# Patient Record
Sex: MALE | Race: WHITE | NOT HISPANIC OR LATINO | Employment: OTHER | URBAN - METROPOLITAN AREA
[De-identification: names, ages, dates, MRNs, and addresses within clinical notes are randomized per-mention and may not be internally consistent; named-entity substitution may affect disease eponyms.]

---

## 2019-01-01 RX ORDER — ATORVASTATIN CALCIUM 40 MG/1
TABLET, FILM COATED ORAL
Refills: 2 | COMMUNITY
Start: 2019-01-01 | End: 2020-01-01 | Stop reason: HOSPADM

## 2019-01-01 RX ORDER — GLIMEPIRIDE 2 MG/1
TABLET ORAL
Refills: 2 | COMMUNITY
Start: 2019-01-01 | End: 2019-01-01 | Stop reason: SDUPTHER

## 2019-01-01 RX ORDER — LISINOPRIL 40 MG/1
TABLET ORAL
Qty: 90 TABLET | Refills: 3 | Status: SHIPPED | OUTPATIENT
Start: 2019-01-01 | End: 2020-01-01 | Stop reason: HOSPADM

## 2019-01-01 RX ORDER — AMLODIPINE BESYLATE 2.5 MG/1
TABLET ORAL
Refills: 1 | COMMUNITY
Start: 2019-01-01 | End: 2020-01-01 | Stop reason: SDUPTHER

## 2019-01-01 RX ORDER — FENOFIBRATE 145 MG/1
TABLET, FILM COATED ORAL
Refills: 2 | COMMUNITY
Start: 2019-01-01 | End: 2019-01-01 | Stop reason: SDUPTHER

## 2019-01-01 RX ORDER — LISINOPRIL 40 MG/1
40 TABLET ORAL DAILY
Qty: 90 TABLET | Refills: 0 | Status: SHIPPED | OUTPATIENT
Start: 2019-01-01 | End: 2019-01-01 | Stop reason: SDUPTHER

## 2019-01-01 RX ORDER — CLOPIDOGREL BISULFATE 75 MG/1
TABLET ORAL
Refills: 1 | COMMUNITY
Start: 2019-01-01 | End: 2020-01-01 | Stop reason: HOSPADM

## 2019-01-01 RX ORDER — EMPAGLIFLOZIN 10 MG/1
TABLET, FILM COATED ORAL
Refills: 5 | COMMUNITY
Start: 2019-01-01 | End: 2019-01-01 | Stop reason: SDUPTHER

## 2019-01-01 RX ORDER — FLUCONAZOLE 150 MG/1
150 TABLET ORAL DAILY
Qty: 2 TABLET | Refills: 3 | Status: ON HOLD | OUTPATIENT
Start: 2019-01-01 | End: 2020-01-01

## 2019-01-01 RX ORDER — METOPROLOL SUCCINATE 50 MG/1
TABLET, EXTENDED RELEASE ORAL
Qty: 90 TABLET | Refills: 3 | Status: SHIPPED | OUTPATIENT
Start: 2019-01-01 | End: 2020-01-01 | Stop reason: HOSPADM

## 2019-01-01 RX ORDER — LISINOPRIL 40 MG/1
TABLET ORAL
Refills: 1 | COMMUNITY
Start: 2019-01-01 | End: 2019-01-01 | Stop reason: SDUPTHER

## 2019-01-01 RX ORDER — GLIMEPIRIDE 2 MG/1
TABLET ORAL
Qty: 90 TABLET | Refills: 3 | Status: SHIPPED | OUTPATIENT
Start: 2019-01-01 | End: 2020-01-01 | Stop reason: HOSPADM

## 2019-01-01 RX ORDER — BLOOD-GLUCOSE METER
EACH MISCELLANEOUS
Refills: 1 | COMMUNITY
Start: 2019-01-01 | End: 2019-01-01 | Stop reason: SDUPTHER

## 2019-01-01 RX ORDER — LANCETS
EACH MISCELLANEOUS
Refills: 4 | COMMUNITY
Start: 2019-01-01 | End: 2020-01-01 | Stop reason: HOSPADM

## 2019-01-01 RX ORDER — METOPROLOL SUCCINATE 50 MG/1
TABLET, EXTENDED RELEASE ORAL
Refills: 1 | COMMUNITY
Start: 2019-01-01 | End: 2019-01-01 | Stop reason: SDUPTHER

## 2019-01-01 RX ORDER — FENOFIBRATE 145 MG/1
TABLET, FILM COATED ORAL
Qty: 90 TABLET | Refills: 3 | Status: SHIPPED | OUTPATIENT
Start: 2019-01-01 | End: 2020-01-01 | Stop reason: HOSPADM

## 2019-01-01 RX ORDER — BLOOD-GLUCOSE METER
EACH MISCELLANEOUS
Qty: 200 STRIP | Refills: 1 | Status: SHIPPED | OUTPATIENT
Start: 2019-01-01 | End: 2020-01-01 | Stop reason: HOSPADM

## 2019-07-18 NOTE — TELEPHONE ENCOUNTER
Left message for pt to return call, wanted to see when the last time pt was seen, and if they needed to schedule with Dr. Russell

## 2019-07-18 NOTE — TELEPHONE ENCOUNTER
----- Message from Mary Villa sent at 7/18/2019  8:50 AM CDT -----  Contact: Heidi Fischer (Wife)  Mr. Fischer is requesting a refill on Lisinopril 40 mg tablets (quanity 90).  [facility] [phone]    Mrs. Fischer can be reached at [phone].    Thanks

## 2019-07-18 NOTE — TELEPHONE ENCOUNTER
----- Message from Janna Thapa sent at 7/18/2019  1:06 PM CDT -----  Regarding: Please call back   Contact: [phone]  Returning Katie's call- Missed call and wants a call back asap

## 2019-08-15 NOTE — TELEPHONE ENCOUNTER
Pt saw you on 6/27, scheduled 6m appt on 1/9/20.     Ok to fill Verio test strips? Pt test twice daily

## 2019-08-15 NOTE — TELEPHONE ENCOUNTER
----- Message from Sully Dexter sent at 8/15/2019 10:46 AM CDT -----  Contact: Wife [phone]  Patient would like a refill for for his diabetic test strips sent to [id number] - [place], [place] - [address]. Please advise.

## 2019-10-21 NOTE — TELEPHONE ENCOUNTER
----- Message from Lauren Ramsay sent at 10/21/2019 10:08 AM CDT -----  Contact: Pt's Wife Mrs Fischer  Pt's Wife Mrs Fischer called to speak to the nurse regarding a previous Rx refill for JARDIANCE 10 mg Tab for the pt and would like the Rx sent [facility] on [address] in [place], [place] [phone] phone. Pharmacy stated that they sent the request over 3 times and Mrs Fischer would like a call back today at [phone]

## 2020-01-01 ENCOUNTER — TELEPHONE (OUTPATIENT)
Dept: PRIMARY CARE CLINIC | Facility: CLINIC | Age: 75
End: 2020-01-01

## 2020-01-01 ENCOUNTER — OFFICE VISIT (OUTPATIENT)
Dept: FAMILY MEDICINE | Facility: CLINIC | Age: 75
End: 2020-01-01
Payer: MEDICARE

## 2020-01-01 ENCOUNTER — HOSPITAL ENCOUNTER (INPATIENT)
Facility: HOSPITAL | Age: 75
LOS: 17 days | DRG: 870 | End: 2020-04-09
Attending: EMERGENCY MEDICINE | Admitting: HOSPITALIST
Payer: MEDICARE

## 2020-01-01 VITALS
SYSTOLIC BLOOD PRESSURE: 120 MMHG | TEMPERATURE: 98 F | WEIGHT: 177.5 LBS | BODY MASS INDEX: 26.9 KG/M2 | DIASTOLIC BLOOD PRESSURE: 68 MMHG | HEART RATE: 80 BPM | OXYGEN SATURATION: 96 % | HEIGHT: 68 IN

## 2020-01-01 VITALS
BODY MASS INDEX: 24.7 KG/M2 | DIASTOLIC BLOOD PRESSURE: 38 MMHG | SYSTOLIC BLOOD PRESSURE: 62 MMHG | OXYGEN SATURATION: 47 % | WEIGHT: 162.94 LBS | HEIGHT: 68 IN | TEMPERATURE: 98 F

## 2020-01-01 DIAGNOSIS — I73.9 PAD (PERIPHERAL ARTERY DISEASE): ICD-10-CM

## 2020-01-01 DIAGNOSIS — E78.2 HYPERLIPIDEMIA, MIXED: ICD-10-CM

## 2020-01-01 DIAGNOSIS — R94.31 PROLONGED Q-T INTERVAL ON ECG: ICD-10-CM

## 2020-01-01 DIAGNOSIS — J18.9 COMMUNITY ACQUIRED PNEUMONIA OF BOTH LUNGS: ICD-10-CM

## 2020-01-01 DIAGNOSIS — E87.1 HYPONATREMIA: ICD-10-CM

## 2020-01-01 DIAGNOSIS — U07.1 PNEUMONIA DUE TO SEVERE ACUTE RESPIRATORY SYNDROME CORONAVIRUS 2 (SARS-COV-2): ICD-10-CM

## 2020-01-01 DIAGNOSIS — J96.01 ACUTE RESPIRATORY FAILURE WITH HYPOXIA: ICD-10-CM

## 2020-01-01 DIAGNOSIS — Z12.5 SCREENING FOR MALIGNANT NEOPLASM OF PROSTATE: ICD-10-CM

## 2020-01-01 DIAGNOSIS — I25.10 ASCVD (ARTERIOSCLEROTIC CARDIOVASCULAR DISEASE): ICD-10-CM

## 2020-01-01 DIAGNOSIS — N17.9 AKI (ACUTE KIDNEY INJURY): Primary | ICD-10-CM

## 2020-01-01 DIAGNOSIS — U07.1 ACUTE RESPIRATORY DISTRESS SYNDROME (ARDS) DUE TO COVID-19 VIRUS: ICD-10-CM

## 2020-01-01 DIAGNOSIS — J44.9 CHRONIC OBSTRUCTIVE PULMONARY DISEASE, UNSPECIFIED COPD TYPE: ICD-10-CM

## 2020-01-01 DIAGNOSIS — R05.9 COUGH: ICD-10-CM

## 2020-01-01 DIAGNOSIS — J12.9 VIRAL PNEUMONIA: ICD-10-CM

## 2020-01-01 DIAGNOSIS — J80 ACUTE RESPIRATORY DISTRESS SYNDROME (ARDS) DUE TO COVID-19 VIRUS: ICD-10-CM

## 2020-01-01 DIAGNOSIS — R07.9 CHEST PAIN: ICD-10-CM

## 2020-01-01 DIAGNOSIS — J06.9 ACUTE RESPIRATORY DISEASE DUE TO COVID-19 VIRUS: ICD-10-CM

## 2020-01-01 DIAGNOSIS — U07.1 ACUTE RESPIRATORY DISEASE DUE TO COVID-19 VIRUS: ICD-10-CM

## 2020-01-01 DIAGNOSIS — E11.51 TYPE 2 DIABETES MELLITUS WITH DIABETIC PERIPHERAL ANGIOPATHY WITHOUT GANGRENE, WITHOUT LONG-TERM CURRENT USE OF INSULIN: Chronic | ICD-10-CM

## 2020-01-01 DIAGNOSIS — E11.51 TYPE 2 DIABETES MELLITUS WITH DIABETIC PERIPHERAL ANGIOPATHY WITHOUT GANGRENE, WITHOUT LONG-TERM CURRENT USE OF INSULIN: Primary | ICD-10-CM

## 2020-01-01 DIAGNOSIS — J12.82 PNEUMONIA DUE TO SEVERE ACUTE RESPIRATORY SYNDROME CORONAVIRUS 2 (SARS-COV-2): ICD-10-CM

## 2020-01-01 DIAGNOSIS — J06.9 URTI (ACUTE UPPER RESPIRATORY INFECTION): Primary | ICD-10-CM

## 2020-01-01 DIAGNOSIS — R73.9 HYPERGLYCEMIA: ICD-10-CM

## 2020-01-01 DIAGNOSIS — I10 ESSENTIAL HYPERTENSION: ICD-10-CM

## 2020-01-01 LAB
ALBUMIN SERPL BCP-MCNC: 1.3 G/DL (ref 3.5–5.2)
ALBUMIN SERPL BCP-MCNC: 1.4 G/DL (ref 3.5–5.2)
ALBUMIN SERPL BCP-MCNC: 1.5 G/DL (ref 3.5–5.2)
ALBUMIN SERPL BCP-MCNC: 1.6 G/DL (ref 3.5–5.2)
ALBUMIN SERPL BCP-MCNC: 1.6 G/DL (ref 3.5–5.2)
ALBUMIN SERPL BCP-MCNC: 1.7 G/DL (ref 3.5–5.2)
ALBUMIN SERPL BCP-MCNC: 1.8 G/DL (ref 3.5–5.2)
ALBUMIN SERPL BCP-MCNC: 1.9 G/DL (ref 3.5–5.2)
ALBUMIN SERPL BCP-MCNC: 2.2 G/DL (ref 3.5–5.2)
ALBUMIN SERPL BCP-MCNC: 2.3 G/DL (ref 3.5–5.2)
ALBUMIN SERPL BCP-MCNC: 3.1 G/DL (ref 3.5–5.2)
ALBUMIN SERPL-MCNC: 4.2 G/DL (ref 3.6–5.1)
ALBUMIN/CREAT UR: 13 MCG/MG CREAT
ALBUMIN/GLOB SERPL: 2.1 (CALC) (ref 1–2.5)
ALLENS TEST: ABNORMAL
ALP SERPL-CCNC: 104 U/L (ref 55–135)
ALP SERPL-CCNC: 105 U/L (ref 55–135)
ALP SERPL-CCNC: 106 U/L (ref 55–135)
ALP SERPL-CCNC: 109 U/L (ref 55–135)
ALP SERPL-CCNC: 115 U/L (ref 55–135)
ALP SERPL-CCNC: 53 U/L (ref 40–115)
ALP SERPL-CCNC: 60 U/L (ref 55–135)
ALP SERPL-CCNC: 61 U/L (ref 55–135)
ALP SERPL-CCNC: 68 U/L (ref 55–135)
ALP SERPL-CCNC: 68 U/L (ref 55–135)
ALP SERPL-CCNC: 97 U/L (ref 55–135)
ALT SERPL W/O P-5'-P-CCNC: 21 U/L (ref 10–44)
ALT SERPL W/O P-5'-P-CCNC: 24 U/L (ref 10–44)
ALT SERPL W/O P-5'-P-CCNC: 27 U/L (ref 10–44)
ALT SERPL W/O P-5'-P-CCNC: 33 U/L (ref 10–44)
ALT SERPL W/O P-5'-P-CCNC: 38 U/L (ref 10–44)
ALT SERPL W/O P-5'-P-CCNC: 45 U/L (ref 10–44)
ALT SERPL W/O P-5'-P-CCNC: 50 U/L (ref 10–44)
ALT SERPL W/O P-5'-P-CCNC: 56 U/L (ref 10–44)
ALT SERPL-CCNC: 19 U/L (ref 9–46)
ANION GAP SERPL CALC-SCNC: 10 MMOL/L (ref 8–16)
ANION GAP SERPL CALC-SCNC: 11 MMOL/L (ref 8–16)
ANION GAP SERPL CALC-SCNC: 12 MMOL/L (ref 8–16)
ANION GAP SERPL CALC-SCNC: 13 MMOL/L (ref 8–16)
ANION GAP SERPL CALC-SCNC: 7 MMOL/L (ref 8–16)
ANION GAP SERPL CALC-SCNC: 8 MMOL/L (ref 8–16)
ANION GAP SERPL CALC-SCNC: 8 MMOL/L (ref 8–16)
ANION GAP SERPL CALC-SCNC: 9 MMOL/L (ref 8–16)
ANISOCYTOSIS BLD QL SMEAR: SLIGHT
AST SERPL-CCNC: 23 U/L (ref 10–35)
AST SERPL-CCNC: 26 U/L (ref 10–40)
AST SERPL-CCNC: 28 U/L (ref 10–40)
AST SERPL-CCNC: 32 U/L (ref 10–40)
AST SERPL-CCNC: 41 U/L (ref 10–40)
AST SERPL-CCNC: 52 U/L (ref 10–40)
AST SERPL-CCNC: 57 U/L (ref 10–40)
AST SERPL-CCNC: 66 U/L (ref 10–40)
AST SERPL-CCNC: 67 U/L (ref 10–40)
AST SERPL-CCNC: 71 U/L (ref 10–40)
AST SERPL-CCNC: 91 U/L (ref 10–40)
BACTERIA #/AREA URNS HPF: NORMAL /HPF
BACTERIA BLD CULT: NORMAL
BACTERIA SPEC AEROBE CULT: ABNORMAL
BASOPHILS # BLD AUTO: 0 K/UL (ref 0–0.2)
BASOPHILS # BLD AUTO: 0.01 K/UL (ref 0–0.2)
BASOPHILS # BLD AUTO: 0.02 K/UL (ref 0–0.2)
BASOPHILS # BLD AUTO: 0.02 K/UL (ref 0–0.2)
BASOPHILS # BLD AUTO: 0.03 K/UL (ref 0–0.2)
BASOPHILS # BLD AUTO: 0.08 K/UL (ref 0–0.2)
BASOPHILS # BLD AUTO: 0.08 K/UL (ref 0–0.2)
BASOPHILS # BLD AUTO: 0.11 K/UL (ref 0–0.2)
BASOPHILS # BLD AUTO: 0.11 K/UL (ref 0–0.2)
BASOPHILS # BLD AUTO: 101 CELLS/UL (ref 0–200)
BASOPHILS NFR BLD AUTO: 2.1 %
BASOPHILS NFR BLD: 0 % (ref 0–1.9)
BASOPHILS NFR BLD: 0.1 % (ref 0–1.9)
BASOPHILS NFR BLD: 0.2 % (ref 0–1.9)
BASOPHILS NFR BLD: 0.3 % (ref 0–1.9)
BASOPHILS NFR BLD: 0.4 % (ref 0–1.9)
BASOPHILS NFR BLD: 0.4 % (ref 0–1.9)
BASOPHILS NFR BLD: 0.5 % (ref 0–1.9)
BILIRUB SERPL-MCNC: 0.3 MG/DL (ref 0.1–1)
BILIRUB SERPL-MCNC: 0.4 MG/DL (ref 0.1–1)
BILIRUB SERPL-MCNC: 0.4 MG/DL (ref 0.1–1)
BILIRUB SERPL-MCNC: 0.6 MG/DL (ref 0.1–1)
BILIRUB SERPL-MCNC: 0.7 MG/DL (ref 0.1–1)
BILIRUB SERPL-MCNC: 0.8 MG/DL (ref 0.1–1)
BILIRUB SERPL-MCNC: 0.8 MG/DL (ref 0.1–1)
BILIRUB SERPL-MCNC: 0.9 MG/DL (ref 0.1–1)
BILIRUB SERPL-MCNC: 0.9 MG/DL (ref 0.1–1)
BILIRUB SERPL-MCNC: 0.9 MG/DL (ref 0.2–1.2)
BILIRUB SERPL-MCNC: 1.3 MG/DL (ref 0.1–1)
BILIRUB UR QL STRIP: NEGATIVE
BNP SERPL-MCNC: 14 PG/ML (ref 0–99)
BUN SERPL-MCNC: 102 MG/DL (ref 8–23)
BUN SERPL-MCNC: 102 MG/DL (ref 8–23)
BUN SERPL-MCNC: 103 MG/DL (ref 8–23)
BUN SERPL-MCNC: 106 MG/DL (ref 8–23)
BUN SERPL-MCNC: 114 MG/DL (ref 8–23)
BUN SERPL-MCNC: 118 MG/DL (ref 8–23)
BUN SERPL-MCNC: 124 MG/DL (ref 8–23)
BUN SERPL-MCNC: 124 MG/DL (ref 8–23)
BUN SERPL-MCNC: 127 MG/DL (ref 8–23)
BUN SERPL-MCNC: 131 MG/DL (ref 8–23)
BUN SERPL-MCNC: 15 MG/DL (ref 8–23)
BUN SERPL-MCNC: 25 MG/DL (ref 7–25)
BUN SERPL-MCNC: 47 MG/DL (ref 8–23)
BUN SERPL-MCNC: 54 MG/DL (ref 8–23)
BUN SERPL-MCNC: 56 MG/DL (ref 8–23)
BUN SERPL-MCNC: 58 MG/DL (ref 8–23)
BUN SERPL-MCNC: 65 MG/DL (ref 8–23)
BUN SERPL-MCNC: 67 MG/DL (ref 8–23)
BUN SERPL-MCNC: 69 MG/DL (ref 8–23)
BUN SERPL-MCNC: 72 MG/DL (ref 8–23)
BUN SERPL-MCNC: 78 MG/DL (ref 8–23)
BUN SERPL-MCNC: 82 MG/DL (ref 8–23)
BUN SERPL-MCNC: 83 MG/DL (ref 8–23)
BUN SERPL-MCNC: 83 MG/DL (ref 8–23)
BUN SERPL-MCNC: 87 MG/DL (ref 8–23)
BUN SERPL-MCNC: 88 MG/DL (ref 8–23)
BUN SERPL-MCNC: 88 MG/DL (ref 8–23)
BUN SERPL-MCNC: 89 MG/DL (ref 8–23)
BUN SERPL-MCNC: 91 MG/DL (ref 8–23)
BUN SERPL-MCNC: 95 MG/DL (ref 8–23)
BUN SERPL-MCNC: 97 MG/DL (ref 8–23)
BUN SERPL-MCNC: 98 MG/DL (ref 8–23)
BUN SERPL-MCNC: 99 MG/DL (ref 8–23)
BUN/CREAT SERPL: 21 (CALC) (ref 6–22)
BURR CELLS BLD QL SMEAR: ABNORMAL
CALCIUM SERPL-MCNC: 7.6 MG/DL (ref 8.7–10.5)
CALCIUM SERPL-MCNC: 7.7 MG/DL (ref 8.7–10.5)
CALCIUM SERPL-MCNC: 7.8 MG/DL (ref 8.7–10.5)
CALCIUM SERPL-MCNC: 7.8 MG/DL (ref 8.7–10.5)
CALCIUM SERPL-MCNC: 7.9 MG/DL (ref 8.7–10.5)
CALCIUM SERPL-MCNC: 8 MG/DL (ref 8.7–10.5)
CALCIUM SERPL-MCNC: 8.1 MG/DL (ref 8.7–10.5)
CALCIUM SERPL-MCNC: 8.2 MG/DL (ref 8.7–10.5)
CALCIUM SERPL-MCNC: 8.3 MG/DL (ref 8.7–10.5)
CALCIUM SERPL-MCNC: 8.4 MG/DL (ref 8.7–10.5)
CALCIUM SERPL-MCNC: 8.5 MG/DL (ref 8.7–10.5)
CALCIUM SERPL-MCNC: 8.6 MG/DL (ref 8.7–10.5)
CALCIUM SERPL-MCNC: 9.3 MG/DL (ref 8.6–10.3)
CALCIUM UR-MCNC: 4.1 MG/DL (ref 0–15)
CHLORIDE SERPL-SCNC: 100 MMOL/L (ref 95–110)
CHLORIDE SERPL-SCNC: 100 MMOL/L (ref 95–110)
CHLORIDE SERPL-SCNC: 102 MMOL/L (ref 95–110)
CHLORIDE SERPL-SCNC: 103 MMOL/L (ref 95–110)
CHLORIDE SERPL-SCNC: 104 MMOL/L (ref 95–110)
CHLORIDE SERPL-SCNC: 105 MMOL/L (ref 95–110)
CHLORIDE SERPL-SCNC: 106 MMOL/L (ref 95–110)
CHLORIDE SERPL-SCNC: 107 MMOL/L (ref 95–110)
CHLORIDE SERPL-SCNC: 107 MMOL/L (ref 98–110)
CHLORIDE SERPL-SCNC: 108 MMOL/L (ref 95–110)
CHLORIDE SERPL-SCNC: 109 MMOL/L (ref 95–110)
CHLORIDE SERPL-SCNC: 112 MMOL/L (ref 95–110)
CHLORIDE SERPL-SCNC: 112 MMOL/L (ref 95–110)
CHLORIDE SERPL-SCNC: 97 MMOL/L (ref 95–110)
CHLORIDE SERPL-SCNC: 97 MMOL/L (ref 95–110)
CHLORIDE SERPL-SCNC: 98 MMOL/L (ref 95–110)
CHLORIDE UR-SCNC: 24 MMOL/L (ref 25–200)
CLARITY UR: CLEAR
CO2 SERPL-SCNC: 17 MMOL/L (ref 23–29)
CO2 SERPL-SCNC: 18 MMOL/L (ref 23–29)
CO2 SERPL-SCNC: 19 MMOL/L (ref 23–29)
CO2 SERPL-SCNC: 20 MMOL/L (ref 23–29)
CO2 SERPL-SCNC: 21 MMOL/L (ref 23–29)
CO2 SERPL-SCNC: 24 MMOL/L (ref 23–29)
CO2 SERPL-SCNC: 24 MMOL/L (ref 23–29)
CO2 SERPL-SCNC: 26 MMOL/L (ref 20–32)
CO2 SERPL-SCNC: 26 MMOL/L (ref 23–29)
CO2 SERPL-SCNC: 27 MMOL/L (ref 23–29)
CO2 SERPL-SCNC: 28 MMOL/L (ref 23–29)
CO2 SERPL-SCNC: 29 MMOL/L (ref 23–29)
CO2 SERPL-SCNC: 30 MMOL/L (ref 23–29)
CO2 SERPL-SCNC: 31 MMOL/L (ref 23–29)
CO2 SERPL-SCNC: 32 MMOL/L (ref 23–29)
COLOR UR: YELLOW
CREAT SERPL-MCNC: 1.1 MG/DL (ref 0.5–1.4)
CREAT SERPL-MCNC: 1.19 MG/DL (ref 0.7–1.18)
CREAT SERPL-MCNC: 1.4 MG/DL (ref 0.5–1.4)
CREAT SERPL-MCNC: 1.6 MG/DL (ref 0.5–1.4)
CREAT SERPL-MCNC: 1.6 MG/DL (ref 0.5–1.4)
CREAT SERPL-MCNC: 1.7 MG/DL (ref 0.5–1.4)
CREAT SERPL-MCNC: 1.7 MG/DL (ref 0.5–1.4)
CREAT SERPL-MCNC: 1.8 MG/DL (ref 0.5–1.4)
CREAT SERPL-MCNC: 1.9 MG/DL (ref 0.5–1.4)
CREAT SERPL-MCNC: 2 MG/DL (ref 0.5–1.4)
CREAT SERPL-MCNC: 2.1 MG/DL (ref 0.5–1.4)
CREAT SERPL-MCNC: 2.2 MG/DL (ref 0.5–1.4)
CREAT SERPL-MCNC: 2.4 MG/DL (ref 0.5–1.4)
CREAT SERPL-MCNC: 2.5 MG/DL (ref 0.5–1.4)
CREAT SERPL-MCNC: 2.5 MG/DL (ref 0.5–1.4)
CREAT SERPL-MCNC: 2.6 MG/DL (ref 0.5–1.4)
CREAT SERPL-MCNC: 2.7 MG/DL (ref 0.5–1.4)
CREAT SERPL-MCNC: 2.7 MG/DL (ref 0.5–1.4)
CREAT SERPL-MCNC: 2.8 MG/DL (ref 0.5–1.4)
CREAT SERPL-MCNC: 3 MG/DL (ref 0.5–1.4)
CREAT SERPL-MCNC: 3.2 MG/DL (ref 0.5–1.4)
CREAT SERPL-MCNC: 3.2 MG/DL (ref 0.5–1.4)
CREAT SERPL-MCNC: 3.4 MG/DL (ref 0.5–1.4)
CREAT UR-MCNC: 102 MG/DL (ref 20–320)
CREAT UR-MCNC: 130.8 MG/DL (ref 23–375)
CREAT UR-MCNC: 178.6 MG/DL (ref 23–375)
CREAT UR-MCNC: 292.8 MG/DL (ref 23–375)
CREAT UR-MCNC: 292.8 MG/DL (ref 23–375)
CRP SERPL-MCNC: 154.1 MG/L (ref 0–8.2)
DACRYOCYTES BLD QL SMEAR: ABNORMAL
DELSYS: ABNORMAL
DIFFERENTIAL METHOD: ABNORMAL
EOSINOPHIL # BLD AUTO: 0 K/UL (ref 0–0.5)
EOSINOPHIL # BLD AUTO: 0 K/UL (ref 0–0.5)
EOSINOPHIL # BLD AUTO: 0.1 K/UL (ref 0–0.5)
EOSINOPHIL # BLD AUTO: 0.2 K/UL (ref 0–0.5)
EOSINOPHIL # BLD AUTO: 0.4 K/UL (ref 0–0.5)
EOSINOPHIL # BLD AUTO: 418 CELLS/UL (ref 15–500)
EOSINOPHIL NFR BLD AUTO: 8.7 %
EOSINOPHIL NFR BLD: 0 % (ref 0–8)
EOSINOPHIL NFR BLD: 0 % (ref 0–8)
EOSINOPHIL NFR BLD: 0.5 % (ref 0–8)
EOSINOPHIL NFR BLD: 0.8 % (ref 0–8)
EOSINOPHIL NFR BLD: 0.8 % (ref 0–8)
EOSINOPHIL NFR BLD: 0.9 % (ref 0–8)
EOSINOPHIL NFR BLD: 0.9 % (ref 0–8)
EOSINOPHIL NFR BLD: 1.1 % (ref 0–8)
EOSINOPHIL NFR BLD: 1.1 % (ref 0–8)
EOSINOPHIL NFR BLD: 1.2 % (ref 0–8)
EOSINOPHIL NFR BLD: 1.7 % (ref 0–8)
EOSINOPHIL NFR BLD: 1.9 % (ref 0–8)
EOSINOPHIL NFR BLD: 2.3 % (ref 0–8)
ERYTHROCYTE [DISTWIDTH] IN BLOOD BY AUTOMATED COUNT: 12.3 % (ref 11–15)
ERYTHROCYTE [DISTWIDTH] IN BLOOD BY AUTOMATED COUNT: 12.9 % (ref 11.5–14.5)
ERYTHROCYTE [DISTWIDTH] IN BLOOD BY AUTOMATED COUNT: 13 % (ref 11.5–14.5)
ERYTHROCYTE [DISTWIDTH] IN BLOOD BY AUTOMATED COUNT: 13.1 % (ref 11.5–14.5)
ERYTHROCYTE [DISTWIDTH] IN BLOOD BY AUTOMATED COUNT: 13.2 % (ref 11.5–14.5)
ERYTHROCYTE [DISTWIDTH] IN BLOOD BY AUTOMATED COUNT: 13.3 % (ref 11.5–14.5)
ERYTHROCYTE [SEDIMENTATION RATE] IN BLOOD BY WESTERGREN METHOD: 18 MM/H
ERYTHROCYTE [SEDIMENTATION RATE] IN BLOOD BY WESTERGREN METHOD: 26 MM/HR (ref 0–10)
EST. GFR  (AFRICAN AMERICAN): 19 ML/MIN/1.73 M^2
EST. GFR  (AFRICAN AMERICAN): 21 ML/MIN/1.73 M^2
EST. GFR  (AFRICAN AMERICAN): 21 ML/MIN/1.73 M^2
EST. GFR  (AFRICAN AMERICAN): 23 ML/MIN/1.73 M^2
EST. GFR  (AFRICAN AMERICAN): 25 ML/MIN/1.73 M^2
EST. GFR  (AFRICAN AMERICAN): 26 ML/MIN/1.73 M^2
EST. GFR  (AFRICAN AMERICAN): 26 ML/MIN/1.73 M^2
EST. GFR  (AFRICAN AMERICAN): 27 ML/MIN/1.73 M^2
EST. GFR  (AFRICAN AMERICAN): 28 ML/MIN/1.73 M^2
EST. GFR  (AFRICAN AMERICAN): 28 ML/MIN/1.73 M^2
EST. GFR  (AFRICAN AMERICAN): 30 ML/MIN/1.73 M^2
EST. GFR  (AFRICAN AMERICAN): 33 ML/MIN/1.73 M^2
EST. GFR  (AFRICAN AMERICAN): 35 ML/MIN/1.73 M^2
EST. GFR  (AFRICAN AMERICAN): 37 ML/MIN/1.73 M^2
EST. GFR  (AFRICAN AMERICAN): 39 ML/MIN/1.73 M^2
EST. GFR  (AFRICAN AMERICAN): 42 ML/MIN/1.73 M^2
EST. GFR  (AFRICAN AMERICAN): 45 ML/MIN/1.73 M^2
EST. GFR  (AFRICAN AMERICAN): 45 ML/MIN/1.73 M^2
EST. GFR  (AFRICAN AMERICAN): 48 ML/MIN/1.73 M^2
EST. GFR  (AFRICAN AMERICAN): 48 ML/MIN/1.73 M^2
EST. GFR  (AFRICAN AMERICAN): 57 ML/MIN/1.73 M^2
EST. GFR  (AFRICAN AMERICAN): >60 ML/MIN/1.73 M^2
EST. GFR  (NON AFRICAN AMERICAN): 17 ML/MIN/1.73 M^2
EST. GFR  (NON AFRICAN AMERICAN): 18 ML/MIN/1.73 M^2
EST. GFR  (NON AFRICAN AMERICAN): 18 ML/MIN/1.73 M^2
EST. GFR  (NON AFRICAN AMERICAN): 20 ML/MIN/1.73 M^2
EST. GFR  (NON AFRICAN AMERICAN): 21 ML/MIN/1.73 M^2
EST. GFR  (NON AFRICAN AMERICAN): 22 ML/MIN/1.73 M^2
EST. GFR  (NON AFRICAN AMERICAN): 22 ML/MIN/1.73 M^2
EST. GFR  (NON AFRICAN AMERICAN): 23 ML/MIN/1.73 M^2
EST. GFR  (NON AFRICAN AMERICAN): 24 ML/MIN/1.73 M^2
EST. GFR  (NON AFRICAN AMERICAN): 24 ML/MIN/1.73 M^2
EST. GFR  (NON AFRICAN AMERICAN): 26 ML/MIN/1.73 M^2
EST. GFR  (NON AFRICAN AMERICAN): 28 ML/MIN/1.73 M^2
EST. GFR  (NON AFRICAN AMERICAN): 30 ML/MIN/1.73 M^2
EST. GFR  (NON AFRICAN AMERICAN): 32 ML/MIN/1.73 M^2
EST. GFR  (NON AFRICAN AMERICAN): 34 ML/MIN/1.73 M^2
EST. GFR  (NON AFRICAN AMERICAN): 36 ML/MIN/1.73 M^2
EST. GFR  (NON AFRICAN AMERICAN): 39 ML/MIN/1.73 M^2
EST. GFR  (NON AFRICAN AMERICAN): 39 ML/MIN/1.73 M^2
EST. GFR  (NON AFRICAN AMERICAN): 42 ML/MIN/1.73 M^2
EST. GFR  (NON AFRICAN AMERICAN): 42 ML/MIN/1.73 M^2
EST. GFR  (NON AFRICAN AMERICAN): 49 ML/MIN/1.73 M^2
EST. GFR  (NON AFRICAN AMERICAN): >60 ML/MIN/1.73 M^2
GFRSERPLBLD MDRD-ARVRAT: 60 ML/MIN/1.73M2
GIANT PLATELETS BLD QL SMEAR: PRESENT
GIANT PLATELETS BLD QL SMEAR: PRESENT
GLOBULIN SER CALC-MCNC: 2 G/DL (CALC) (ref 1.9–3.7)
GLUCOSE SERPL-MCNC: 105 MG/DL (ref 70–110)
GLUCOSE SERPL-MCNC: 105 MG/DL (ref 70–110)
GLUCOSE SERPL-MCNC: 107 MG/DL (ref 70–110)
GLUCOSE SERPL-MCNC: 118 MG/DL (ref 70–110)
GLUCOSE SERPL-MCNC: 138 MG/DL (ref 70–110)
GLUCOSE SERPL-MCNC: 148 MG/DL (ref 65–99)
GLUCOSE SERPL-MCNC: 162 MG/DL (ref 70–110)
GLUCOSE SERPL-MCNC: 180 MG/DL (ref 70–110)
GLUCOSE SERPL-MCNC: 183 MG/DL (ref 70–110)
GLUCOSE SERPL-MCNC: 186 MG/DL (ref 70–110)
GLUCOSE SERPL-MCNC: 187 MG/DL (ref 70–110)
GLUCOSE SERPL-MCNC: 187 MG/DL (ref 70–110)
GLUCOSE SERPL-MCNC: 189 MG/DL (ref 70–110)
GLUCOSE SERPL-MCNC: 198 MG/DL (ref 70–110)
GLUCOSE SERPL-MCNC: 204 MG/DL (ref 70–110)
GLUCOSE SERPL-MCNC: 210 MG/DL (ref 70–110)
GLUCOSE SERPL-MCNC: 210 MG/DL (ref 70–110)
GLUCOSE SERPL-MCNC: 218 MG/DL (ref 70–110)
GLUCOSE SERPL-MCNC: 237 MG/DL (ref 70–110)
GLUCOSE SERPL-MCNC: 245 MG/DL (ref 70–110)
GLUCOSE SERPL-MCNC: 245 MG/DL (ref 70–110)
GLUCOSE SERPL-MCNC: 247 MG/DL (ref 70–110)
GLUCOSE SERPL-MCNC: 256 MG/DL (ref 70–110)
GLUCOSE SERPL-MCNC: 262 MG/DL (ref 70–110)
GLUCOSE SERPL-MCNC: 270 MG/DL (ref 70–110)
GLUCOSE SERPL-MCNC: 275 MG/DL (ref 70–110)
GLUCOSE SERPL-MCNC: 275 MG/DL (ref 70–110)
GLUCOSE SERPL-MCNC: 283 MG/DL (ref 70–110)
GLUCOSE SERPL-MCNC: 308 MG/DL (ref 70–110)
GLUCOSE SERPL-MCNC: 354 MG/DL (ref 70–110)
GLUCOSE SERPL-MCNC: 354 MG/DL (ref 70–110)
GLUCOSE SERPL-MCNC: 419 MG/DL (ref 70–110)
GLUCOSE SERPL-MCNC: 45 MG/DL (ref 70–110)
GLUCOSE SERPL-MCNC: 467 MG/DL (ref 70–110)
GLUCOSE SERPL-MCNC: 78 MG/DL (ref 70–110)
GLUCOSE UR QL STRIP: ABNORMAL
GRAM STN SPEC: ABNORMAL
HBA1C MFR BLD: 7.4 % OF TOTAL HGB
HCO3 UR-SCNC: 20.1 MMOL/L (ref 24–28)
HCT VFR BLD AUTO: 37.6 % (ref 40–54)
HCT VFR BLD AUTO: 38.5 % (ref 40–54)
HCT VFR BLD AUTO: 38.7 % (ref 40–54)
HCT VFR BLD AUTO: 38.9 % (ref 40–54)
HCT VFR BLD AUTO: 39.6 % (ref 40–54)
HCT VFR BLD AUTO: 41.4 % (ref 40–54)
HCT VFR BLD AUTO: 42.1 % (ref 40–54)
HCT VFR BLD AUTO: 44 % (ref 40–54)
HCT VFR BLD AUTO: 45.7 % (ref 40–54)
HCT VFR BLD AUTO: 47.4 % (ref 40–54)
HCT VFR BLD AUTO: 49.3 % (ref 38.5–50)
HCT VFR BLD AUTO: 49.7 % (ref 40–54)
HGB BLD-MCNC: 12.3 G/DL (ref 14–18)
HGB BLD-MCNC: 12.3 G/DL (ref 14–18)
HGB BLD-MCNC: 12.4 G/DL (ref 14–18)
HGB BLD-MCNC: 12.5 G/DL (ref 14–18)
HGB BLD-MCNC: 13.1 G/DL (ref 14–18)
HGB BLD-MCNC: 13.8 G/DL (ref 14–18)
HGB BLD-MCNC: 13.9 G/DL (ref 14–18)
HGB BLD-MCNC: 14.4 G/DL (ref 14–18)
HGB BLD-MCNC: 15.2 G/DL (ref 14–18)
HGB BLD-MCNC: 15.9 G/DL (ref 14–18)
HGB BLD-MCNC: 16.3 G/DL (ref 13.2–17.1)
HGB BLD-MCNC: 16.9 G/DL (ref 14–18)
HGB UR QL STRIP: ABNORMAL
IMM GRANULOCYTES # BLD AUTO: 0.01 K/UL (ref 0–0.04)
IMM GRANULOCYTES # BLD AUTO: 0.03 K/UL (ref 0–0.04)
IMM GRANULOCYTES # BLD AUTO: 0.05 K/UL (ref 0–0.04)
IMM GRANULOCYTES # BLD AUTO: 0.06 K/UL (ref 0–0.04)
IMM GRANULOCYTES # BLD AUTO: 0.09 K/UL (ref 0–0.04)
IMM GRANULOCYTES # BLD AUTO: 0.11 K/UL (ref 0–0.04)
IMM GRANULOCYTES # BLD AUTO: 0.14 K/UL (ref 0–0.04)
IMM GRANULOCYTES # BLD AUTO: 0.15 K/UL (ref 0–0.04)
IMM GRANULOCYTES # BLD AUTO: 0.16 K/UL (ref 0–0.04)
IMM GRANULOCYTES # BLD AUTO: 0.18 K/UL (ref 0–0.04)
IMM GRANULOCYTES # BLD AUTO: 0.28 K/UL (ref 0–0.04)
IMM GRANULOCYTES # BLD AUTO: 0.28 K/UL (ref 0–0.04)
IMM GRANULOCYTES # BLD AUTO: 0.39 K/UL (ref 0–0.04)
IMM GRANULOCYTES NFR BLD AUTO: 0.4 % (ref 0–0.5)
IMM GRANULOCYTES NFR BLD AUTO: 0.5 % (ref 0–0.5)
IMM GRANULOCYTES NFR BLD AUTO: 0.6 % (ref 0–0.5)
IMM GRANULOCYTES NFR BLD AUTO: 0.7 % (ref 0–0.5)
IMM GRANULOCYTES NFR BLD AUTO: 0.7 % (ref 0–0.5)
IMM GRANULOCYTES NFR BLD AUTO: 1 % (ref 0–0.5)
IMM GRANULOCYTES NFR BLD AUTO: 1.1 % (ref 0–0.5)
IMM GRANULOCYTES NFR BLD AUTO: 1.4 % (ref 0–0.5)
IMM GRANULOCYTES NFR BLD AUTO: 1.5 % (ref 0–0.5)
IMM GRANULOCYTES NFR BLD AUTO: 1.6 % (ref 0–0.5)
INFLUENZA A, MOLECULAR: NEGATIVE
INFLUENZA B, MOLECULAR: NEGATIVE
KETONES UR QL STRIP: NEGATIVE
LACTATE SERPL-SCNC: 1 MMOL/L (ref 0.5–2.2)
LEUKOCYTE ESTERASE UR QL STRIP: NEGATIVE
LYMPHOCYTES # BLD AUTO: 0.3 K/UL (ref 1–4.8)
LYMPHOCYTES # BLD AUTO: 0.4 K/UL (ref 1–4.8)
LYMPHOCYTES # BLD AUTO: 0.4 K/UL (ref 1–4.8)
LYMPHOCYTES # BLD AUTO: 0.5 K/UL (ref 1–4.8)
LYMPHOCYTES # BLD AUTO: 0.6 K/UL (ref 1–4.8)
LYMPHOCYTES # BLD AUTO: 0.6 K/UL (ref 1–4.8)
LYMPHOCYTES # BLD AUTO: 0.7 K/UL (ref 1–4.8)
LYMPHOCYTES # BLD AUTO: 0.8 K/UL (ref 1–4.8)
LYMPHOCYTES # BLD AUTO: 1277 CELLS/UL (ref 850–3900)
LYMPHOCYTES NFR BLD AUTO: 26.6 %
LYMPHOCYTES NFR BLD: 11.9 % (ref 18–48)
LYMPHOCYTES NFR BLD: 2.5 % (ref 18–48)
LYMPHOCYTES NFR BLD: 2.5 % (ref 18–48)
LYMPHOCYTES NFR BLD: 2.9 % (ref 18–48)
LYMPHOCYTES NFR BLD: 3 % (ref 18–48)
LYMPHOCYTES NFR BLD: 3 % (ref 18–48)
LYMPHOCYTES NFR BLD: 3.1 % (ref 18–48)
LYMPHOCYTES NFR BLD: 3.5 % (ref 18–48)
LYMPHOCYTES NFR BLD: 4.2 % (ref 18–48)
LYMPHOCYTES NFR BLD: 4.9 % (ref 18–48)
LYMPHOCYTES NFR BLD: 6.4 % (ref 18–48)
LYMPHOCYTES NFR BLD: 7.2 % (ref 18–48)
LYMPHOCYTES NFR BLD: 9.4 % (ref 18–48)
MAGNESIUM SERPL-MCNC: 2.4 MG/DL (ref 1.6–2.6)
MAGNESIUM SERPL-MCNC: 2.5 MG/DL (ref 1.6–2.6)
MAGNESIUM SERPL-MCNC: 2.6 MG/DL (ref 1.6–2.6)
MAGNESIUM SERPL-MCNC: 2.7 MG/DL (ref 1.6–2.6)
MAGNESIUM SERPL-MCNC: 2.8 MG/DL (ref 1.6–2.6)
MAGNESIUM SERPL-MCNC: 3.2 MG/DL (ref 1.6–2.6)
MCH RBC QN AUTO: 30.3 PG (ref 27–31)
MCH RBC QN AUTO: 30.3 PG (ref 27–31)
MCH RBC QN AUTO: 30.4 PG (ref 27–31)
MCH RBC QN AUTO: 30.4 PG (ref 27–31)
MCH RBC QN AUTO: 30.6 PG (ref 27–31)
MCH RBC QN AUTO: 30.6 PG (ref 27–31)
MCH RBC QN AUTO: 30.9 PG (ref 27–31)
MCH RBC QN AUTO: 30.9 PG (ref 27–31)
MCH RBC QN AUTO: 31 PG (ref 27–31)
MCH RBC QN AUTO: 31.5 PG (ref 27–33)
MCH RBC QN AUTO: 31.6 PG (ref 27–31)
MCH RBC QN AUTO: 31.6 PG (ref 27–31)
MCHC RBC AUTO-ENTMCNC: 31.6 G/DL (ref 32–36)
MCHC RBC AUTO-ENTMCNC: 31.9 G/DL (ref 32–36)
MCHC RBC AUTO-ENTMCNC: 32 G/DL (ref 32–36)
MCHC RBC AUTO-ENTMCNC: 32.7 G/DL (ref 32–36)
MCHC RBC AUTO-ENTMCNC: 32.8 G/DL (ref 32–36)
MCHC RBC AUTO-ENTMCNC: 33.1 G/DL (ref 32–36)
MCHC RBC AUTO-ENTMCNC: 33.3 G/DL (ref 32–36)
MCHC RBC AUTO-ENTMCNC: 33.5 G/DL (ref 32–36)
MCHC RBC AUTO-ENTMCNC: 33.6 G/DL (ref 32–36)
MCHC RBC AUTO-ENTMCNC: 33.7 G/DL (ref 32–36)
MCHC RBC AUTO-ENTMCNC: 34 G/DL (ref 32–36)
MCV RBC AUTO: 91 FL (ref 82–98)
MCV RBC AUTO: 91 FL (ref 82–98)
MCV RBC AUTO: 92 FL (ref 82–98)
MCV RBC AUTO: 94 FL (ref 82–98)
MCV RBC AUTO: 94 FL (ref 82–98)
MCV RBC AUTO: 95 FL (ref 82–98)
MCV RBC AUTO: 95.2 FL (ref 80–100)
MCV RBC AUTO: 96 FL (ref 82–98)
MCV RBC AUTO: 97 FL (ref 82–98)
MICROALBUMIN UR-MCNC: 1.3 MG/DL
MICROSCOPIC COMMENT: NORMAL
MODE: ABNORMAL
MONOCYTES # BLD AUTO: 0.2 K/UL (ref 0.3–1)
MONOCYTES # BLD AUTO: 0.3 K/UL (ref 0.3–1)
MONOCYTES # BLD AUTO: 0.4 K/UL (ref 0.3–1)
MONOCYTES # BLD AUTO: 0.5 K/UL (ref 0.3–1)
MONOCYTES # BLD AUTO: 0.5 K/UL (ref 0.3–1)
MONOCYTES # BLD AUTO: 0.7 K/UL (ref 0.3–1)
MONOCYTES # BLD AUTO: 0.7 K/UL (ref 0.3–1)
MONOCYTES # BLD AUTO: 0.8 K/UL (ref 0.3–1)
MONOCYTES # BLD AUTO: 0.9 K/UL (ref 0.3–1)
MONOCYTES # BLD AUTO: 1 K/UL (ref 0.3–1)
MONOCYTES # BLD AUTO: 566 CELLS/UL (ref 200–950)
MONOCYTES NFR BLD AUTO: 11.8 %
MONOCYTES NFR BLD: 2.8 % (ref 4–15)
MONOCYTES NFR BLD: 3.1 % (ref 4–15)
MONOCYTES NFR BLD: 3.1 % (ref 4–15)
MONOCYTES NFR BLD: 3.6 % (ref 4–15)
MONOCYTES NFR BLD: 4.8 % (ref 4–15)
MONOCYTES NFR BLD: 5 % (ref 4–15)
MONOCYTES NFR BLD: 5.3 % (ref 4–15)
MONOCYTES NFR BLD: 5.4 % (ref 4–15)
MONOCYTES NFR BLD: 6.1 % (ref 4–15)
MONOCYTES NFR BLD: 6.8 % (ref 4–15)
MONOCYTES NFR BLD: 7.1 % (ref 4–15)
MONOCYTES NFR BLD: 8.4 % (ref 4–15)
MONOCYTES NFR BLD: 9.1 % (ref 4–15)
NEUTROPHILS # BLD AUTO: 11.5 K/UL (ref 1.8–7.7)
NEUTROPHILS # BLD AUTO: 12.2 K/UL (ref 1.8–7.7)
NEUTROPHILS # BLD AUTO: 15 K/UL (ref 1.8–7.7)
NEUTROPHILS # BLD AUTO: 2.3 K/UL (ref 1.8–7.7)
NEUTROPHILS # BLD AUTO: 24.5 K/UL (ref 1.8–7.7)
NEUTROPHILS # BLD AUTO: 2438 CELLS/UL (ref 1500–7800)
NEUTROPHILS # BLD AUTO: 26.2 K/UL (ref 1.8–7.7)
NEUTROPHILS # BLD AUTO: 26.2 K/UL (ref 1.8–7.7)
NEUTROPHILS # BLD AUTO: 5.1 K/UL (ref 1.8–7.7)
NEUTROPHILS # BLD AUTO: 6 K/UL (ref 1.8–7.7)
NEUTROPHILS # BLD AUTO: 8 K/UL (ref 1.8–7.7)
NEUTROPHILS # BLD AUTO: 8.7 K/UL (ref 1.8–7.7)
NEUTROPHILS # BLD AUTO: 9.4 K/UL (ref 1.8–7.7)
NEUTROPHILS # BLD AUTO: 9.7 K/UL (ref 1.8–7.7)
NEUTROPHILS NFR BLD AUTO: 50.8 %
NEUTROPHILS NFR BLD: 82 % (ref 38–73)
NEUTROPHILS NFR BLD: 82.3 % (ref 38–73)
NEUTROPHILS NFR BLD: 83.4 % (ref 38–73)
NEUTROPHILS NFR BLD: 84.3 % (ref 38–73)
NEUTROPHILS NFR BLD: 85.8 % (ref 38–73)
NEUTROPHILS NFR BLD: 87 % (ref 38–73)
NEUTROPHILS NFR BLD: 87.2 % (ref 38–73)
NEUTROPHILS NFR BLD: 87.4 % (ref 38–73)
NEUTROPHILS NFR BLD: 88.8 % (ref 38–73)
NEUTROPHILS NFR BLD: 91.6 % (ref 38–73)
NEUTROPHILS NFR BLD: 91.9 % (ref 38–73)
NEUTROPHILS NFR BLD: 92.2 % (ref 38–73)
NEUTROPHILS NFR BLD: 92.2 % (ref 38–73)
NITRITE UR QL STRIP: NEGATIVE
NRBC BLD-RTO: 0 /100 WBC
OSMOLALITY SERPL: 301 MOSM/KG (ref 280–300)
OSMOLALITY UR: 342 MOSM/KG (ref 50–1200)
PCO2 BLDA: 37 MMHG (ref 35–45)
PEEP: 12
PH SMN: 7.34 [PH] (ref 7.35–7.45)
PH UR STRIP: 6 [PH] (ref 5–8)
PHOSPHATE SERPL-MCNC: 2.5 MG/DL (ref 2.7–4.5)
PHOSPHATE SERPL-MCNC: 3 MG/DL (ref 2.7–4.5)
PHOSPHATE SERPL-MCNC: 3.1 MG/DL (ref 2.7–4.5)
PHOSPHATE SERPL-MCNC: 3.3 MG/DL (ref 2.7–4.5)
PHOSPHATE SERPL-MCNC: 3.4 MG/DL (ref 2.7–4.5)
PHOSPHATE SERPL-MCNC: 3.5 MG/DL (ref 2.7–4.5)
PHOSPHATE SERPL-MCNC: 3.5 MG/DL (ref 2.7–4.5)
PHOSPHATE SERPL-MCNC: 3.8 MG/DL (ref 2.7–4.5)
PHOSPHATE SERPL-MCNC: 3.8 MG/DL (ref 2.7–4.5)
PHOSPHATE SERPL-MCNC: 3.9 MG/DL (ref 2.7–4.5)
PHOSPHATE SERPL-MCNC: 4.1 MG/DL (ref 2.7–4.5)
PHOSPHATE SERPL-MCNC: 4.1 MG/DL (ref 2.7–4.5)
PHOSPHATE SERPL-MCNC: 4.3 MG/DL (ref 2.7–4.5)
PHOSPHATE SERPL-MCNC: 4.4 MG/DL (ref 2.7–4.5)
PHOSPHATE SERPL-MCNC: 4.5 MG/DL (ref 2.7–4.5)
PHOSPHATE SERPL-MCNC: 4.6 MG/DL (ref 2.7–4.5)
PHOSPHATE SERPL-MCNC: 5.1 MG/DL (ref 2.7–4.5)
PLATELET # BLD AUTO: 159 K/UL (ref 150–350)
PLATELET # BLD AUTO: 188 K/UL (ref 150–350)
PLATELET # BLD AUTO: 190 K/UL (ref 150–350)
PLATELET # BLD AUTO: 195 K/UL (ref 150–350)
PLATELET # BLD AUTO: 199 K/UL (ref 150–350)
PLATELET # BLD AUTO: 203 K/UL (ref 150–350)
PLATELET # BLD AUTO: 205 K/UL (ref 150–350)
PLATELET # BLD AUTO: 206 K/UL (ref 150–350)
PLATELET # BLD AUTO: 215 K/UL (ref 150–350)
PLATELET # BLD AUTO: 215 THOUSAND/UL (ref 140–400)
PLATELET # BLD AUTO: 262 K/UL (ref 150–350)
PLATELET # BLD AUTO: 294 K/UL (ref 150–350)
PLATELET # BLD AUTO: 294 K/UL (ref 150–350)
PLATELET # BLD AUTO: 305 K/UL (ref 150–350)
PLATELET BLD QL SMEAR: ABNORMAL
PMV BLD AUTO: 10 FL (ref 9.2–12.9)
PMV BLD AUTO: 10.4 FL (ref 9.2–12.9)
PMV BLD AUTO: 10.9 FL (ref 9.2–12.9)
PMV BLD AUTO: 11 FL (ref 9.2–12.9)
PMV BLD AUTO: 11.4 FL (ref 9.2–12.9)
PMV BLD AUTO: 12 FL (ref 9.2–12.9)
PMV BLD AUTO: 12 FL (ref 9.2–12.9)
PMV BLD AUTO: 9.3 FL (ref 9.2–12.9)
PMV BLD AUTO: 9.4 FL (ref 9.2–12.9)
PMV BLD AUTO: 9.6 FL (ref 9.2–12.9)
PMV BLD AUTO: 9.6 FL (ref 9.2–12.9)
PMV BLD AUTO: 9.7 FL (ref 9.2–12.9)
PMV BLD AUTO: 9.8 FL (ref 9.2–12.9)
PMV BLD REES-ECKER: 10.8 FL (ref 7.5–12.5)
PO2 BLDA: 80 MMHG (ref 80–100)
POC BE: -6 MMOL/L
POC SATURATED O2: 95 % (ref 95–100)
POC TCO2: 21 MMOL/L (ref 23–27)
POCT GLUCOSE: 100 MG/DL (ref 70–110)
POCT GLUCOSE: 101 MG/DL (ref 70–110)
POCT GLUCOSE: 103 MG/DL (ref 70–110)
POCT GLUCOSE: 111 MG/DL (ref 70–110)
POCT GLUCOSE: 113 MG/DL (ref 70–110)
POCT GLUCOSE: 114 MG/DL (ref 70–110)
POCT GLUCOSE: 124 MG/DL (ref 70–110)
POCT GLUCOSE: 131 MG/DL (ref 70–110)
POCT GLUCOSE: 140 MG/DL (ref 70–110)
POCT GLUCOSE: 141 MG/DL (ref 70–110)
POCT GLUCOSE: 151 MG/DL (ref 70–110)
POCT GLUCOSE: 151 MG/DL (ref 70–110)
POCT GLUCOSE: 152 MG/DL (ref 70–110)
POCT GLUCOSE: 153 MG/DL (ref 70–110)
POCT GLUCOSE: 154 MG/DL (ref 70–110)
POCT GLUCOSE: 157 MG/DL (ref 70–110)
POCT GLUCOSE: 162 MG/DL (ref 70–110)
POCT GLUCOSE: 164 MG/DL (ref 70–110)
POCT GLUCOSE: 170 MG/DL (ref 70–110)
POCT GLUCOSE: 171 MG/DL (ref 70–110)
POCT GLUCOSE: 176 MG/DL (ref 70–110)
POCT GLUCOSE: 180 MG/DL (ref 70–110)
POCT GLUCOSE: 182 MG/DL (ref 70–110)
POCT GLUCOSE: 192 MG/DL (ref 70–110)
POCT GLUCOSE: 193 MG/DL (ref 70–110)
POCT GLUCOSE: 194 MG/DL (ref 70–110)
POCT GLUCOSE: 206 MG/DL (ref 70–110)
POCT GLUCOSE: 208 MG/DL (ref 70–110)
POCT GLUCOSE: 213 MG/DL (ref 70–110)
POCT GLUCOSE: 213 MG/DL (ref 70–110)
POCT GLUCOSE: 221 MG/DL (ref 70–110)
POCT GLUCOSE: 229 MG/DL (ref 70–110)
POCT GLUCOSE: 232 MG/DL (ref 70–110)
POCT GLUCOSE: 234 MG/DL (ref 70–110)
POCT GLUCOSE: 244 MG/DL (ref 70–110)
POCT GLUCOSE: 244 MG/DL (ref 70–110)
POCT GLUCOSE: 251 MG/DL (ref 70–110)
POCT GLUCOSE: 254 MG/DL (ref 70–110)
POCT GLUCOSE: 255 MG/DL (ref 70–110)
POCT GLUCOSE: 263 MG/DL (ref 70–110)
POCT GLUCOSE: 265 MG/DL (ref 70–110)
POCT GLUCOSE: 267 MG/DL (ref 70–110)
POCT GLUCOSE: 272 MG/DL (ref 70–110)
POCT GLUCOSE: 273 MG/DL (ref 70–110)
POCT GLUCOSE: 274 MG/DL (ref 70–110)
POCT GLUCOSE: 276 MG/DL (ref 70–110)
POCT GLUCOSE: 280 MG/DL (ref 70–110)
POCT GLUCOSE: 281 MG/DL (ref 70–110)
POCT GLUCOSE: 283 MG/DL (ref 70–110)
POCT GLUCOSE: 285 MG/DL (ref 70–110)
POCT GLUCOSE: 287 MG/DL (ref 70–110)
POCT GLUCOSE: 288 MG/DL (ref 70–110)
POCT GLUCOSE: 292 MG/DL (ref 70–110)
POCT GLUCOSE: 297 MG/DL (ref 70–110)
POCT GLUCOSE: 307 MG/DL (ref 70–110)
POCT GLUCOSE: 317 MG/DL (ref 70–110)
POCT GLUCOSE: 321 MG/DL (ref 70–110)
POCT GLUCOSE: 33 MG/DL (ref 70–110)
POCT GLUCOSE: 33 MG/DL (ref 70–110)
POCT GLUCOSE: 352 MG/DL (ref 70–110)
POCT GLUCOSE: 363 MG/DL (ref 70–110)
POCT GLUCOSE: 385 MG/DL (ref 70–110)
POCT GLUCOSE: 396 MG/DL (ref 70–110)
POCT GLUCOSE: 405 MG/DL (ref 70–110)
POCT GLUCOSE: 418 MG/DL (ref 70–110)
POCT GLUCOSE: 42 MG/DL (ref 70–110)
POCT GLUCOSE: 42 MG/DL (ref 70–110)
POCT GLUCOSE: 49 MG/DL (ref 70–110)
POCT GLUCOSE: 68 MG/DL (ref 70–110)
POCT GLUCOSE: 68 MG/DL (ref 70–110)
POCT GLUCOSE: 70 MG/DL (ref 70–110)
POCT GLUCOSE: 88 MG/DL (ref 70–110)
POCT GLUCOSE: 90 MG/DL (ref 70–110)
POCT GLUCOSE: 90 MG/DL (ref 70–110)
POCT GLUCOSE: 99 MG/DL (ref 70–110)
POIKILOCYTOSIS BLD QL SMEAR: SLIGHT
POTASSIUM SERPL-SCNC: 3.8 MMOL/L (ref 3.5–5.1)
POTASSIUM SERPL-SCNC: 4.1 MMOL/L (ref 3.5–5.1)
POTASSIUM SERPL-SCNC: 4.3 MMOL/L (ref 3.5–5.1)
POTASSIUM SERPL-SCNC: 4.4 MMOL/L (ref 3.5–5.1)
POTASSIUM SERPL-SCNC: 4.5 MMOL/L (ref 3.5–5.1)
POTASSIUM SERPL-SCNC: 4.6 MMOL/L (ref 3.5–5.1)
POTASSIUM SERPL-SCNC: 4.7 MMOL/L (ref 3.5–5.1)
POTASSIUM SERPL-SCNC: 4.7 MMOL/L (ref 3.5–5.3)
POTASSIUM SERPL-SCNC: 4.8 MMOL/L (ref 3.5–5.1)
POTASSIUM SERPL-SCNC: 4.8 MMOL/L (ref 3.5–5.1)
POTASSIUM SERPL-SCNC: 4.9 MMOL/L (ref 3.5–5.1)
POTASSIUM SERPL-SCNC: 5.1 MMOL/L (ref 3.5–5.1)
POTASSIUM SERPL-SCNC: 5.3 MMOL/L (ref 3.5–5.1)
POTASSIUM SERPL-SCNC: 5.6 MMOL/L (ref 3.5–5.1)
POTASSIUM SERPL-SCNC: 5.6 MMOL/L (ref 3.5–5.1)
POTASSIUM SERPL-SCNC: 5.7 MMOL/L (ref 3.5–5.1)
POTASSIUM SERPL-SCNC: 5.7 MMOL/L (ref 3.5–5.1)
POTASSIUM SERPL-SCNC: 5.8 MMOL/L (ref 3.5–5.1)
POTASSIUM SERPL-SCNC: 5.8 MMOL/L (ref 3.5–5.1)
POTASSIUM SERPL-SCNC: 5.9 MMOL/L (ref 3.5–5.1)
POTASSIUM SERPL-SCNC: 6 MMOL/L (ref 3.5–5.1)
POTASSIUM SERPL-SCNC: 6 MMOL/L (ref 3.5–5.1)
POTASSIUM SERPL-SCNC: 6.1 MMOL/L (ref 3.5–5.1)
POTASSIUM UR-SCNC: 23 MMOL/L (ref 15–95)
POTASSIUM UR-SCNC: 34 MMOL/L (ref 15–95)
PROCALCITONIN SERPL IA-MCNC: 0.07 NG/ML
PROT SERPL-MCNC: 5 G/DL (ref 6–8.4)
PROT SERPL-MCNC: 5.1 G/DL (ref 6–8.4)
PROT SERPL-MCNC: 5.3 G/DL (ref 6–8.4)
PROT SERPL-MCNC: 5.4 G/DL (ref 6–8.4)
PROT SERPL-MCNC: 5.5 G/DL (ref 6–8.4)
PROT SERPL-MCNC: 5.6 G/DL (ref 6–8.4)
PROT SERPL-MCNC: 6.2 G/DL (ref 6.1–8.1)
PROT SERPL-MCNC: 6.5 G/DL (ref 6–8.4)
PROT SERPL-MCNC: 6.8 G/DL (ref 6–8.4)
PROT UR QL STRIP: ABNORMAL
PROT UR-MCNC: 52 MG/DL (ref 0–15)
PROT UR-MCNC: 54 MG/DL (ref 0–15)
PROT/CREAT UR: 0.18 MG/G{CREAT} (ref 0–0.2)
PROT/CREAT UR: 0.29 MG/G{CREAT} (ref 0–0.2)
PSA SERPL-MCNC: 0.7 NG/ML
RBC # BLD AUTO: 4.01 M/UL (ref 4.6–6.2)
RBC # BLD AUTO: 4.02 M/UL (ref 4.6–6.2)
RBC # BLD AUTO: 4.04 M/UL (ref 4.6–6.2)
RBC # BLD AUTO: 4.12 M/UL (ref 4.6–6.2)
RBC # BLD AUTO: 4.22 M/UL (ref 4.6–6.2)
RBC # BLD AUTO: 4.48 M/UL (ref 4.6–6.2)
RBC # BLD AUTO: 4.55 M/UL (ref 4.6–6.2)
RBC # BLD AUTO: 4.55 M/UL (ref 4.6–6.2)
RBC # BLD AUTO: 4.56 M/UL (ref 4.6–6.2)
RBC # BLD AUTO: 4.66 M/UL (ref 4.6–6.2)
RBC # BLD AUTO: 4.96 M/UL (ref 4.6–6.2)
RBC # BLD AUTO: 5.18 MILLION/UL (ref 4.2–5.8)
RBC # BLD AUTO: 5.23 M/UL (ref 4.6–6.2)
RBC # BLD AUTO: 5.46 M/UL (ref 4.6–6.2)
RBC #/AREA URNS HPF: 0 /HPF (ref 0–4)
SAMPLE: ABNORMAL
SARS-COV-2 RNA RESP QL NAA+PROBE: DETECTED
SCHISTOCYTES BLD QL SMEAR: ABNORMAL
SITE: ABNORMAL
SODIUM SERPL-SCNC: 125 MMOL/L (ref 136–145)
SODIUM SERPL-SCNC: 127 MMOL/L (ref 136–145)
SODIUM SERPL-SCNC: 128 MMOL/L (ref 136–145)
SODIUM SERPL-SCNC: 129 MMOL/L (ref 136–145)
SODIUM SERPL-SCNC: 131 MMOL/L (ref 136–145)
SODIUM SERPL-SCNC: 132 MMOL/L (ref 136–145)
SODIUM SERPL-SCNC: 132 MMOL/L (ref 136–145)
SODIUM SERPL-SCNC: 134 MMOL/L (ref 136–145)
SODIUM SERPL-SCNC: 134 MMOL/L (ref 136–145)
SODIUM SERPL-SCNC: 136 MMOL/L (ref 136–145)
SODIUM SERPL-SCNC: 137 MMOL/L (ref 136–145)
SODIUM SERPL-SCNC: 140 MMOL/L (ref 136–145)
SODIUM SERPL-SCNC: 141 MMOL/L (ref 135–146)
SODIUM SERPL-SCNC: 141 MMOL/L (ref 136–145)
SODIUM SERPL-SCNC: 142 MMOL/L (ref 136–145)
SODIUM SERPL-SCNC: 143 MMOL/L (ref 136–145)
SODIUM SERPL-SCNC: 144 MMOL/L (ref 136–145)
SODIUM SERPL-SCNC: 145 MMOL/L (ref 136–145)
SODIUM SERPL-SCNC: 146 MMOL/L (ref 136–145)
SODIUM SERPL-SCNC: 147 MMOL/L (ref 136–145)
SODIUM SERPL-SCNC: 148 MMOL/L (ref 136–145)
SODIUM SERPL-SCNC: 148 MMOL/L (ref 136–145)
SODIUM SERPL-SCNC: 149 MMOL/L (ref 136–145)
SODIUM SERPL-SCNC: 152 MMOL/L (ref 136–145)
SODIUM SERPL-SCNC: 152 MMOL/L (ref 136–145)
SODIUM UR-SCNC: <20 MMOL/L (ref 20–250)
SODIUM UR-SCNC: <20 MMOL/L (ref 20–250)
SP GR UR STRIP: 1.01 (ref 1–1.03)
SPECIMEN SOURCE: NORMAL
TRIGL SERPL-MCNC: 135 MG/DL (ref 30–150)
TRIGL SERPL-MCNC: 152 MG/DL (ref 30–150)
TRIGL SERPL-MCNC: 82 MG/DL (ref 30–150)
TRIGL SERPL-MCNC: 94 MG/DL (ref 30–150)
TROPONIN I SERPL DL<=0.01 NG/ML-MCNC: 0.04 NG/ML (ref 0–0.03)
TROPONIN I SERPL DL<=0.01 NG/ML-MCNC: 0.05 NG/ML (ref 0–0.03)
TROPONIN I SERPL DL<=0.01 NG/ML-MCNC: 0.06 NG/ML (ref 0–0.03)
TROPONIN I SERPL DL<=0.01 NG/ML-MCNC: 0.08 NG/ML (ref 0–0.03)
URATE UR-MCNC: 22.5 MG/DL
URN SPEC COLLECT METH UR: ABNORMAL
UROBILINOGEN UR STRIP-ACNC: NEGATIVE EU/DL
UUN UR-MCNC: 195 MG/DL (ref 140–1050)
UUN UR-MCNC: 484 MG/DL (ref 140–1050)
VANCOMYCIN SERPL-MCNC: 9.8 UG/ML
VANCOMYCIN SERPL-MCNC: <1.1 UG/ML
VT: 420
WBC # BLD AUTO: 11.21 K/UL (ref 3.9–12.7)
WBC # BLD AUTO: 11.51 K/UL (ref 3.9–12.7)
WBC # BLD AUTO: 13.15 K/UL (ref 3.9–12.7)
WBC # BLD AUTO: 13.34 K/UL (ref 3.9–12.7)
WBC # BLD AUTO: 17.28 K/UL (ref 3.9–12.7)
WBC # BLD AUTO: 2.78 K/UL (ref 3.9–12.7)
WBC # BLD AUTO: 26.6 K/UL (ref 3.9–12.7)
WBC # BLD AUTO: 28.36 K/UL (ref 3.9–12.7)
WBC # BLD AUTO: 28.36 K/UL (ref 3.9–12.7)
WBC # BLD AUTO: 4.8 THOUSAND/UL (ref 3.8–10.8)
WBC # BLD AUTO: 6.2 K/UL (ref 3.9–12.7)
WBC # BLD AUTO: 6.84 K/UL (ref 3.9–12.7)
WBC # BLD AUTO: 9.37 K/UL (ref 3.9–12.7)
WBC # BLD AUTO: 9.83 K/UL (ref 3.9–12.7)
WBC #/AREA URNS HPF: 1 /HPF (ref 0–5)
YEAST URNS QL MICRO: NORMAL

## 2020-01-01 PROCEDURE — 25000003 PHARM REV CODE 250: Performed by: FAMILY MEDICINE

## 2020-01-01 PROCEDURE — 97803 MED NUTRITION INDIV SUBSEQ: CPT

## 2020-01-01 PROCEDURE — 63600175 PHARM REV CODE 636 W HCPCS: Performed by: CLINIC/CENTER

## 2020-01-01 PROCEDURE — 94003 VENT MGMT INPAT SUBQ DAY: CPT

## 2020-01-01 PROCEDURE — 99214 PR OFFICE/OUTPT VISIT, EST, LEVL IV, 30-39 MIN: ICD-10-PCS | Mod: S$PBB,,, | Performed by: INTERNAL MEDICINE

## 2020-01-01 PROCEDURE — 99900035 HC TECH TIME PER 15 MIN (STAT)

## 2020-01-01 PROCEDURE — 87106 FUNGI IDENTIFICATION YEAST: CPT | Mod: 59

## 2020-01-01 PROCEDURE — 63600175 PHARM REV CODE 636 W HCPCS: Performed by: FAMILY MEDICINE

## 2020-01-01 PROCEDURE — 94640 AIRWAY INHALATION TREATMENT: CPT

## 2020-01-01 PROCEDURE — 20000000 HC ICU ROOM

## 2020-01-01 PROCEDURE — 82436 ASSAY OF URINE CHLORIDE: CPT

## 2020-01-01 PROCEDURE — 82570 ASSAY OF URINE CREATININE: CPT

## 2020-01-01 PROCEDURE — 63600175 PHARM REV CODE 636 W HCPCS: Performed by: STUDENT IN AN ORGANIZED HEALTH CARE EDUCATION/TRAINING PROGRAM

## 2020-01-01 PROCEDURE — 80053 COMPREHEN METABOLIC PANEL: CPT

## 2020-01-01 PROCEDURE — 27000221 HC OXYGEN, UP TO 24 HOURS

## 2020-01-01 PROCEDURE — 25000003 PHARM REV CODE 250: Performed by: NURSE PRACTITIONER

## 2020-01-01 PROCEDURE — 25000003 PHARM REV CODE 250: Performed by: HOSPITALIST

## 2020-01-01 PROCEDURE — C9399 UNCLASSIFIED DRUGS OR BIOLOG: HCPCS | Performed by: STUDENT IN AN ORGANIZED HEALTH CARE EDUCATION/TRAINING PROGRAM

## 2020-01-01 PROCEDURE — 63600175 PHARM REV CODE 636 W HCPCS: Performed by: INTERNAL MEDICINE

## 2020-01-01 PROCEDURE — 25000242 PHARM REV CODE 250 ALT 637 W/ HCPCS: Performed by: STUDENT IN AN ORGANIZED HEALTH CARE EDUCATION/TRAINING PROGRAM

## 2020-01-01 PROCEDURE — 93005 ELECTROCARDIOGRAM TRACING: CPT

## 2020-01-01 PROCEDURE — 27100098 HC SPACER

## 2020-01-01 PROCEDURE — 25000003 PHARM REV CODE 250: Performed by: STUDENT IN AN ORGANIZED HEALTH CARE EDUCATION/TRAINING PROGRAM

## 2020-01-01 PROCEDURE — 27200966 HC CLOSED SUCTION SYSTEM

## 2020-01-01 PROCEDURE — 99285 EMERGENCY DEPT VISIT HI MDM: CPT | Mod: 25

## 2020-01-01 PROCEDURE — 84156 ASSAY OF PROTEIN URINE: CPT

## 2020-01-01 PROCEDURE — 63600175 PHARM REV CODE 636 W HCPCS

## 2020-01-01 PROCEDURE — 27202415 HC CARTRIDGE, CRRT

## 2020-01-01 PROCEDURE — 25000003 PHARM REV CODE 250: Performed by: INTERNAL MEDICINE

## 2020-01-01 PROCEDURE — 83880 ASSAY OF NATRIURETIC PEPTIDE: CPT

## 2020-01-01 PROCEDURE — 87070 CULTURE OTHR SPECIMN AEROBIC: CPT

## 2020-01-01 PROCEDURE — 84484 ASSAY OF TROPONIN QUANT: CPT

## 2020-01-01 PROCEDURE — 83735 ASSAY OF MAGNESIUM: CPT

## 2020-01-01 PROCEDURE — 80069 RENAL FUNCTION PANEL: CPT | Mod: 91

## 2020-01-01 PROCEDURE — 63600175 PHARM REV CODE 636 W HCPCS: Mod: JG | Performed by: INTERNAL MEDICINE

## 2020-01-01 PROCEDURE — 84133 ASSAY OF URINE POTASSIUM: CPT

## 2020-01-01 PROCEDURE — S0028 INJECTION, FAMOTIDINE, 20 MG: HCPCS | Performed by: STUDENT IN AN ORGANIZED HEALTH CARE EDUCATION/TRAINING PROGRAM

## 2020-01-01 PROCEDURE — 99900026 HC AIRWAY MAINTENANCE (STAT)

## 2020-01-01 PROCEDURE — 82803 BLOOD GASES ANY COMBINATION: CPT

## 2020-01-01 PROCEDURE — 90945 DIALYSIS ONE EVALUATION: CPT

## 2020-01-01 PROCEDURE — 36415 COLL VENOUS BLD VENIPUNCTURE: CPT

## 2020-01-01 PROCEDURE — 85025 COMPLETE CBC W/AUTO DIFF WBC: CPT

## 2020-01-01 PROCEDURE — C9399 UNCLASSIFIED DRUGS OR BIOLOG: HCPCS | Performed by: FAMILY MEDICINE

## 2020-01-01 PROCEDURE — 99999 PR PBB SHADOW E&M-EST. PATIENT-LVL III: ICD-10-PCS | Mod: PBBFAC,,, | Performed by: INTERNAL MEDICINE

## 2020-01-01 PROCEDURE — 80100008 HC CRRT DAILY MAINTENANCE

## 2020-01-01 PROCEDURE — 63600175 PHARM REV CODE 636 W HCPCS: Performed by: HOSPITALIST

## 2020-01-01 PROCEDURE — 93010 ELECTROCARDIOGRAM REPORT: CPT | Mod: ,,, | Performed by: INTERNAL MEDICINE

## 2020-01-01 PROCEDURE — 99213 OFFICE O/P EST LOW 20 MIN: CPT | Mod: PBBFAC,PN | Performed by: INTERNAL MEDICINE

## 2020-01-01 PROCEDURE — 83935 ASSAY OF URINE OSMOLALITY: CPT

## 2020-01-01 PROCEDURE — 25000003 PHARM REV CODE 250

## 2020-01-01 PROCEDURE — 94761 N-INVAS EAR/PLS OXIMETRY MLT: CPT

## 2020-01-01 PROCEDURE — 84484 ASSAY OF TROPONIN QUANT: CPT | Mod: 91

## 2020-01-01 PROCEDURE — 25000242 PHARM REV CODE 250 ALT 637 W/ HCPCS: Performed by: NURSE PRACTITIONER

## 2020-01-01 PROCEDURE — 80069 RENAL FUNCTION PANEL: CPT

## 2020-01-01 PROCEDURE — 99999 PR PBB SHADOW E&M-EST. PATIENT-LVL III: CPT | Mod: PBBFAC,,, | Performed by: INTERNAL MEDICINE

## 2020-01-01 PROCEDURE — 31500 INTUBATION: ICD-10-PCS | Mod: GC,,, | Performed by: INTERNAL MEDICINE

## 2020-01-01 PROCEDURE — 51702 INSERT TEMP BLADDER CATH: CPT

## 2020-01-01 PROCEDURE — 84540 ASSAY OF URINE/UREA-N: CPT

## 2020-01-01 PROCEDURE — S0028 INJECTION, FAMOTIDINE, 20 MG: HCPCS | Performed by: FAMILY MEDICINE

## 2020-01-01 PROCEDURE — 80048 BASIC METABOLIC PNL TOTAL CA: CPT

## 2020-01-01 PROCEDURE — 86140 C-REACTIVE PROTEIN: CPT

## 2020-01-01 PROCEDURE — 94002 VENT MGMT INPAT INIT DAY: CPT

## 2020-01-01 PROCEDURE — 27100171 HC OXYGEN HIGH FLOW UP TO 24 HOURS

## 2020-01-01 PROCEDURE — 83735 ASSAY OF MAGNESIUM: CPT | Mod: 91

## 2020-01-01 PROCEDURE — 84100 ASSAY OF PHOSPHORUS: CPT

## 2020-01-01 PROCEDURE — U0002 COVID-19 LAB TEST NON-CDC: HCPCS

## 2020-01-01 PROCEDURE — 83930 ASSAY OF BLOOD OSMOLALITY: CPT

## 2020-01-01 PROCEDURE — 85652 RBC SED RATE AUTOMATED: CPT

## 2020-01-01 PROCEDURE — 31500 INSERT EMERGENCY AIRWAY: CPT | Mod: GC,,, | Performed by: INTERNAL MEDICINE

## 2020-01-01 PROCEDURE — 81000 URINALYSIS NONAUTO W/SCOPE: CPT

## 2020-01-01 PROCEDURE — 82340 ASSAY OF CALCIUM IN URINE: CPT

## 2020-01-01 PROCEDURE — 84478 ASSAY OF TRIGLYCERIDES: CPT

## 2020-01-01 PROCEDURE — 1159F MED LIST DOCD IN RCRD: CPT | Mod: ,,, | Performed by: INTERNAL MEDICINE

## 2020-01-01 PROCEDURE — G0378 HOSPITAL OBSERVATION PER HR: HCPCS

## 2020-01-01 PROCEDURE — 99233 PR SUBSEQUENT HOSPITAL CARE,LEVL III: ICD-10-PCS | Mod: ,,, | Performed by: NURSE PRACTITIONER

## 2020-01-01 PROCEDURE — 93005 ELECTROCARDIOGRAM TRACING: CPT | Performed by: INTERNAL MEDICINE

## 2020-01-01 PROCEDURE — 36600 WITHDRAWAL OF ARTERIAL BLOOD: CPT

## 2020-01-01 PROCEDURE — 80202 ASSAY OF VANCOMYCIN: CPT

## 2020-01-01 PROCEDURE — 87502 INFLUENZA DNA AMP PROBE: CPT

## 2020-01-01 PROCEDURE — 93010 EKG 12-LEAD: ICD-10-PCS | Mod: ,,, | Performed by: INTERNAL MEDICINE

## 2020-01-01 PROCEDURE — 1126F AMNT PAIN NOTED NONE PRSNT: CPT | Mod: ,,, | Performed by: INTERNAL MEDICINE

## 2020-01-01 PROCEDURE — 87205 SMEAR GRAM STAIN: CPT

## 2020-01-01 PROCEDURE — 63600175 PHARM REV CODE 636 W HCPCS: Mod: JG | Performed by: STUDENT IN AN ORGANIZED HEALTH CARE EDUCATION/TRAINING PROGRAM

## 2020-01-01 PROCEDURE — 36556 PR INSERT NON-TUNNEL CV CATH 5+ YRS OLD: ICD-10-PCS | Mod: ,,, | Performed by: SURGERY

## 2020-01-01 PROCEDURE — 80048 BASIC METABOLIC PNL TOTAL CA: CPT | Mod: 91

## 2020-01-01 PROCEDURE — 63600175 PHARM REV CODE 636 W HCPCS: Mod: JG | Performed by: CLINIC/CENTER

## 2020-01-01 PROCEDURE — 87040 BLOOD CULTURE FOR BACTERIA: CPT | Mod: 59

## 2020-01-01 PROCEDURE — 84300 ASSAY OF URINE SODIUM: CPT

## 2020-01-01 PROCEDURE — 63600175 PHARM REV CODE 636 W HCPCS: Performed by: NURSE PRACTITIONER

## 2020-01-01 PROCEDURE — 83605 ASSAY OF LACTIC ACID: CPT

## 2020-01-01 PROCEDURE — 84560 ASSAY OF URINE/URIC ACID: CPT

## 2020-01-01 PROCEDURE — 36556 INSERT NON-TUNNEL CV CATH: CPT | Mod: ,,, | Performed by: SURGERY

## 2020-01-01 PROCEDURE — 99214 OFFICE O/P EST MOD 30 MIN: CPT | Mod: S$PBB,,, | Performed by: INTERNAL MEDICINE

## 2020-01-01 PROCEDURE — 97802 MEDICAL NUTRITION INDIV IN: CPT

## 2020-01-01 PROCEDURE — 1159F PR MEDICATION LIST DOCUMENTED IN MEDICAL RECORD: ICD-10-PCS | Mod: ,,, | Performed by: INTERNAL MEDICINE

## 2020-01-01 PROCEDURE — 84145 PROCALCITONIN (PCT): CPT

## 2020-01-01 PROCEDURE — 99233 SBSQ HOSP IP/OBS HIGH 50: CPT | Mod: ,,, | Performed by: NURSE PRACTITIONER

## 2020-01-01 PROCEDURE — 1126F PR PAIN SEVERITY QUANTIFIED, NO PAIN PRESENT: ICD-10-PCS | Mod: ,,, | Performed by: INTERNAL MEDICINE

## 2020-01-01 RX ORDER — FAMOTIDINE 10 MG/ML
20 INJECTION INTRAVENOUS DAILY
Status: DISCONTINUED | OUTPATIENT
Start: 2020-01-01 | End: 2020-01-01

## 2020-01-01 RX ORDER — DEXTROSE MONOHYDRATE 50 MG/ML
INJECTION, SOLUTION INTRAVENOUS ONCE
Status: COMPLETED | OUTPATIENT
Start: 2020-01-01 | End: 2020-01-01

## 2020-01-01 RX ORDER — LIDOCAINE HYDROCHLORIDE 10 MG/ML
INJECTION INFILTRATION; PERINEURAL
Status: COMPLETED
Start: 2020-01-01 | End: 2020-01-01

## 2020-01-01 RX ORDER — DEXTROSE 50 % IN WATER (D50W) INTRAVENOUS SYRINGE
25
Status: DISCONTINUED | OUTPATIENT
Start: 2020-01-01 | End: 2020-01-01

## 2020-01-01 RX ORDER — FENOFIBRATE 145 MG/1
145 TABLET, FILM COATED ORAL DAILY
Status: DISCONTINUED | OUTPATIENT
Start: 2020-01-01 | End: 2020-01-01

## 2020-01-01 RX ORDER — DEXTROSE MONOHYDRATE 100 MG/ML
INJECTION, SOLUTION INTRAVENOUS CONTINUOUS
Status: DISCONTINUED | OUTPATIENT
Start: 2020-01-01 | End: 2020-01-01

## 2020-01-01 RX ORDER — HYDROXYCHLOROQUINE SULFATE 200 MG/1
200 TABLET, FILM COATED ORAL 2 TIMES DAILY
Status: DISCONTINUED | OUTPATIENT
Start: 2020-01-01 | End: 2020-01-01

## 2020-01-01 RX ORDER — ACETAMINOPHEN 325 MG/1
650 TABLET ORAL EVERY 4 HOURS PRN
Status: DISCONTINUED | OUTPATIENT
Start: 2020-01-01 | End: 2020-01-01 | Stop reason: HOSPADM

## 2020-01-01 RX ORDER — FENTANYL CITRATE 50 UG/ML
50 INJECTION, SOLUTION INTRAMUSCULAR; INTRAVENOUS EVERY 30 MIN PRN
Status: DISCONTINUED | OUTPATIENT
Start: 2020-01-01 | End: 2020-01-01 | Stop reason: HOSPADM

## 2020-01-01 RX ORDER — DEXTROSE 50 % IN WATER (D50W) INTRAVENOUS SYRINGE
25 ONCE
Status: COMPLETED | OUTPATIENT
Start: 2020-01-01 | End: 2020-01-01

## 2020-01-01 RX ORDER — GUAIFENESIN 600 MG/1
1200 TABLET, EXTENDED RELEASE ORAL 2 TIMES DAILY
Status: DISCONTINUED | OUTPATIENT
Start: 2020-01-01 | End: 2020-01-01

## 2020-01-01 RX ORDER — HEPARIN SODIUM 1000 [USP'U]/ML
2000 INJECTION, SOLUTION INTRAVENOUS; SUBCUTANEOUS ONCE
Status: DISCONTINUED | OUTPATIENT
Start: 2020-01-01 | End: 2020-01-01

## 2020-01-01 RX ORDER — DEXMEDETOMIDINE HYDROCHLORIDE 4 UG/ML
0.2 INJECTION, SOLUTION INTRAVENOUS CONTINUOUS
Status: DISCONTINUED | OUTPATIENT
Start: 2020-01-01 | End: 2020-01-01

## 2020-01-01 RX ORDER — NOREPINEPHRINE BITARTRATE/D5W 8 MG/250ML
0.02 PLASTIC BAG, INJECTION (ML) INTRAVENOUS CONTINUOUS
Status: DISCONTINUED | OUTPATIENT
Start: 2020-01-01 | End: 2020-01-01

## 2020-01-01 RX ORDER — HEPARIN SODIUM 1000 [USP'U]/ML
1300 INJECTION, SOLUTION INTRAVENOUS; SUBCUTANEOUS ONCE
Status: COMPLETED | OUTPATIENT
Start: 2020-01-01 | End: 2020-01-01

## 2020-01-01 RX ORDER — CLOPIDOGREL BISULFATE 75 MG/1
75 TABLET ORAL DAILY
Status: DISCONTINUED | OUTPATIENT
Start: 2020-01-01 | End: 2020-01-01

## 2020-01-01 RX ORDER — GLYCOPYRROLATE 0.2 MG/ML
0.2 INJECTION INTRAMUSCULAR; INTRAVENOUS EVERY 4 HOURS
Status: DISCONTINUED | OUTPATIENT
Start: 2020-01-01 | End: 2020-01-01

## 2020-01-01 RX ORDER — LISINOPRIL 20 MG/1
40 TABLET ORAL DAILY
Status: DISCONTINUED | OUTPATIENT
Start: 2020-01-01 | End: 2020-01-01

## 2020-01-01 RX ORDER — METOPROLOL SUCCINATE 25 MG/1
25 TABLET, EXTENDED RELEASE ORAL DAILY
Status: DISCONTINUED | OUTPATIENT
Start: 2020-01-01 | End: 2020-01-01

## 2020-01-01 RX ORDER — HYDROMORPHONE HYDROCHLORIDE 2 MG/ML
2 INJECTION, SOLUTION INTRAMUSCULAR; INTRAVENOUS; SUBCUTANEOUS
Status: DISCONTINUED | OUTPATIENT
Start: 2020-01-01 | End: 2020-01-01 | Stop reason: HOSPADM

## 2020-01-01 RX ORDER — AZITHROMYCIN 250 MG/1
500 TABLET, FILM COATED ORAL DAILY
Status: DISCONTINUED | OUTPATIENT
Start: 2020-01-01 | End: 2020-01-01

## 2020-01-01 RX ORDER — FUROSEMIDE 10 MG/ML
INJECTION INTRAMUSCULAR; INTRAVENOUS
Status: DISPENSED
Start: 2020-01-01 | End: 2020-01-01

## 2020-01-01 RX ORDER — SODIUM CHLORIDE 0.9 % (FLUSH) 0.9 %
10 SYRINGE (ML) INJECTION
Status: DISCONTINUED | OUTPATIENT
Start: 2020-01-01 | End: 2020-01-01 | Stop reason: HOSPADM

## 2020-01-01 RX ORDER — FUROSEMIDE 10 MG/ML
80 INJECTION INTRAMUSCULAR; INTRAVENOUS ONCE
Status: COMPLETED | OUTPATIENT
Start: 2020-01-01 | End: 2020-01-01

## 2020-01-01 RX ORDER — CLOPIDOGREL BISULFATE 75 MG/1
75 TABLET ORAL DAILY
Status: DISCONTINUED | OUTPATIENT
Start: 2020-01-01 | End: 2020-01-01 | Stop reason: HOSPADM

## 2020-01-01 RX ORDER — PROPOFOL 10 MG/ML
INJECTION, EMULSION INTRAVENOUS
Status: DISPENSED
Start: 2020-01-01 | End: 2020-01-01

## 2020-01-01 RX ORDER — ATORVASTATIN CALCIUM 40 MG/1
40 TABLET, FILM COATED ORAL DAILY
Status: DISCONTINUED | OUTPATIENT
Start: 2020-01-01 | End: 2020-01-01 | Stop reason: HOSPADM

## 2020-01-01 RX ORDER — SODIUM CITRATE AND CITRIC ACID MONOHYDRATE 334; 500 MG/5ML; MG/5ML
30 SOLUTION ORAL 3 TIMES DAILY
Status: DISCONTINUED | OUTPATIENT
Start: 2020-01-01 | End: 2020-01-01

## 2020-01-01 RX ORDER — ROCURONIUM BROMIDE 10 MG/ML
INJECTION, SOLUTION INTRAVENOUS
Status: DISCONTINUED
Start: 2020-01-01 | End: 2020-01-01 | Stop reason: WASHOUT

## 2020-01-01 RX ORDER — HEPARIN 100 UNIT/ML
2 SYRINGE INTRAVENOUS
Status: DISCONTINUED | OUTPATIENT
Start: 2020-01-01 | End: 2020-01-01

## 2020-01-01 RX ORDER — BENZONATATE 100 MG/1
100 CAPSULE ORAL 3 TIMES DAILY PRN
Status: DISCONTINUED | OUTPATIENT
Start: 2020-01-01 | End: 2020-01-01

## 2020-01-01 RX ORDER — MIDODRINE HYDROCHLORIDE 5 MG/1
10 TABLET ORAL 3 TIMES DAILY
Status: DISCONTINUED | OUTPATIENT
Start: 2020-01-01 | End: 2020-01-01

## 2020-01-01 RX ORDER — GUAIFENESIN 600 MG/1
600 TABLET, EXTENDED RELEASE ORAL 2 TIMES DAILY
Status: DISCONTINUED | OUTPATIENT
Start: 2020-01-01 | End: 2020-01-01

## 2020-01-01 RX ORDER — PROPOFOL 10 MG/ML
5 INJECTION, EMULSION INTRAVENOUS CONTINUOUS
Status: DISCONTINUED | OUTPATIENT
Start: 2020-01-01 | End: 2020-01-01

## 2020-01-01 RX ORDER — METOPROLOL TARTRATE 25 MG/1
25 TABLET, FILM COATED ORAL 2 TIMES DAILY
Status: DISCONTINUED | OUTPATIENT
Start: 2020-01-01 | End: 2020-01-01

## 2020-01-01 RX ORDER — TALC
6 POWDER (GRAM) TOPICAL NIGHTLY PRN
Status: DISCONTINUED | OUTPATIENT
Start: 2020-01-01 | End: 2020-01-01

## 2020-01-01 RX ORDER — FENTANYL CITRATE 50 UG/ML
INJECTION, SOLUTION INTRAMUSCULAR; INTRAVENOUS
Status: COMPLETED
Start: 2020-01-01 | End: 2020-01-01

## 2020-01-01 RX ORDER — FAMOTIDINE 10 MG/ML
20 INJECTION INTRAVENOUS 2 TIMES DAILY
Status: DISCONTINUED | OUTPATIENT
Start: 2020-01-01 | End: 2020-01-01

## 2020-01-01 RX ORDER — INSULIN ASPART 100 [IU]/ML
1-10 INJECTION, SOLUTION INTRAVENOUS; SUBCUTANEOUS
Status: DISCONTINUED | OUTPATIENT
Start: 2020-01-01 | End: 2020-01-01

## 2020-01-01 RX ORDER — PROPOFOL 10 MG/ML
INJECTION, EMULSION INTRAVENOUS
Status: COMPLETED
Start: 2020-01-01 | End: 2020-01-01

## 2020-01-01 RX ORDER — FENTANYL CITRATE-0.9 % NACL/PF 10 MCG/ML
PLASTIC BAG, INJECTION (ML) INTRAVENOUS CONTINUOUS
Status: DISCONTINUED | OUTPATIENT
Start: 2020-01-01 | End: 2020-01-01

## 2020-01-01 RX ORDER — IBUPROFEN 200 MG
24 TABLET ORAL
Status: DISCONTINUED | OUTPATIENT
Start: 2020-01-01 | End: 2020-01-01

## 2020-01-01 RX ORDER — HYDROXYCHLOROQUINE SULFATE 200 MG/1
400 TABLET, FILM COATED ORAL DAILY
Status: DISCONTINUED | OUTPATIENT
Start: 2020-01-01 | End: 2020-01-01

## 2020-01-01 RX ORDER — FENTANYL CITRAT/DEXTROSE 5%/PF 100 MCG/10
25 PATIENT CONTROLLED ANALGESIA SYRINGE INTRAVENOUS CONTINUOUS
Status: DISCONTINUED | OUTPATIENT
Start: 2020-01-01 | End: 2020-01-01

## 2020-01-01 RX ORDER — AMLODIPINE BESYLATE 2.5 MG/1
2.5 TABLET ORAL DAILY
Status: DISCONTINUED | OUTPATIENT
Start: 2020-01-01 | End: 2020-01-01

## 2020-01-01 RX ORDER — HEPARIN SODIUM 5000 [USP'U]/ML
5000 INJECTION, SOLUTION INTRAVENOUS; SUBCUTANEOUS EVERY 12 HOURS
Status: DISCONTINUED | OUTPATIENT
Start: 2020-01-01 | End: 2020-01-01 | Stop reason: HOSPADM

## 2020-01-01 RX ORDER — FENTANYL CITRATE 50 UG/ML
50 INJECTION, SOLUTION INTRAMUSCULAR; INTRAVENOUS
Status: DISCONTINUED | OUTPATIENT
Start: 2020-01-01 | End: 2020-01-01

## 2020-01-01 RX ORDER — HEPARIN SODIUM 10000 [USP'U]/100ML
750 INJECTION, SOLUTION INTRAVENOUS CONTINUOUS
Status: DISCONTINUED | OUTPATIENT
Start: 2020-01-01 | End: 2020-01-01

## 2020-01-01 RX ORDER — MAG HYDROX/ALUMINUM HYD/SIMETH 200-200-20
30 SUSPENSION, ORAL (FINAL DOSE FORM) ORAL EVERY 4 HOURS PRN
Status: DISCONTINUED | OUTPATIENT
Start: 2020-01-01 | End: 2020-01-01 | Stop reason: HOSPADM

## 2020-01-01 RX ORDER — METOPROLOL SUCCINATE 50 MG/1
50 TABLET, EXTENDED RELEASE ORAL DAILY
Status: DISCONTINUED | OUTPATIENT
Start: 2020-01-01 | End: 2020-01-01

## 2020-01-01 RX ORDER — FENTANYL CITRAT/DEXTROSE 5%/PF 100 MCG/10
PATIENT CONTROLLED ANALGESIA SYRINGE INTRAVENOUS CONTINUOUS
Status: DISCONTINUED | OUTPATIENT
Start: 2020-01-01 | End: 2020-01-01

## 2020-01-01 RX ORDER — MORPHINE SULFATE 2 MG/ML
2 INJECTION, SOLUTION INTRAMUSCULAR; INTRAVENOUS EVERY 30 MIN PRN
Status: DISCONTINUED | OUTPATIENT
Start: 2020-01-01 | End: 2020-01-01

## 2020-01-01 RX ORDER — CARVEDILOL 6.25 MG/1
6.25 TABLET ORAL 2 TIMES DAILY
Status: DISCONTINUED | OUTPATIENT
Start: 2020-01-01 | End: 2020-01-01

## 2020-01-01 RX ORDER — HEPARIN SODIUM 1000 [USP'U]/ML
2000 INJECTION, SOLUTION INTRAVENOUS; SUBCUTANEOUS
Status: DISCONTINUED | OUTPATIENT
Start: 2020-01-01 | End: 2020-01-01

## 2020-01-01 RX ORDER — DEXTROSE 50 % IN WATER (D50W) INTRAVENOUS SYRINGE
25 ONCE
Status: DISCONTINUED | OUTPATIENT
Start: 2020-01-01 | End: 2020-01-01

## 2020-01-01 RX ORDER — LEVOFLOXACIN 500 MG/1
500 TABLET, FILM COATED ORAL DAILY
Qty: 7 TABLET | Refills: 0 | Status: SHIPPED | OUTPATIENT
Start: 2020-01-01 | End: 2020-01-01 | Stop reason: HOSPADM

## 2020-01-01 RX ORDER — CHLORHEXIDINE GLUCONATE ORAL RINSE 1.2 MG/ML
15 SOLUTION DENTAL 2 TIMES DAILY
Status: DISCONTINUED | OUTPATIENT
Start: 2020-01-01 | End: 2020-01-01

## 2020-01-01 RX ORDER — ALBUTEROL SULFATE 2.5 MG/.5ML
10 SOLUTION RESPIRATORY (INHALATION) ONCE
Status: DISCONTINUED | OUTPATIENT
Start: 2020-01-01 | End: 2020-01-01

## 2020-01-01 RX ORDER — HYDROCODONE BITARTRATE AND ACETAMINOPHEN 500; 5 MG/1; MG/1
TABLET ORAL CONTINUOUS
Status: DISPENSED | OUTPATIENT
Start: 2020-01-01 | End: 2020-01-01

## 2020-01-01 RX ORDER — IBUPROFEN 200 MG
16 TABLET ORAL
Status: DISCONTINUED | OUTPATIENT
Start: 2020-01-01 | End: 2020-01-01

## 2020-01-01 RX ORDER — BENZONATATE 100 MG/1
200 CAPSULE ORAL 3 TIMES DAILY PRN
Status: DISCONTINUED | OUTPATIENT
Start: 2020-01-01 | End: 2020-01-01

## 2020-01-01 RX ORDER — IPRATROPIUM BROMIDE AND ALBUTEROL SULFATE 2.5; .5 MG/3ML; MG/3ML
3 SOLUTION RESPIRATORY (INHALATION) EVERY 4 HOURS
Status: DISCONTINUED | OUTPATIENT
Start: 2020-01-01 | End: 2020-01-01

## 2020-01-01 RX ORDER — ATORVASTATIN CALCIUM 40 MG/1
40 TABLET, FILM COATED ORAL NIGHTLY
Status: DISCONTINUED | OUTPATIENT
Start: 2020-01-01 | End: 2020-01-01

## 2020-01-01 RX ORDER — CEFEPIME HYDROCHLORIDE 2 G/50ML
2 INJECTION, SOLUTION INTRAVENOUS
Status: DISCONTINUED | OUTPATIENT
Start: 2020-01-01 | End: 2020-01-01

## 2020-01-01 RX ORDER — AMLODIPINE BESYLATE 2.5 MG/1
2.5 TABLET ORAL DAILY
Qty: 90 TABLET | Refills: 3 | Status: SHIPPED | OUTPATIENT
Start: 2020-01-01 | End: 2020-01-01 | Stop reason: HOSPADM

## 2020-01-01 RX ORDER — INDOMETHACIN 25 MG/1
25 CAPSULE ORAL ONCE
Status: COMPLETED | OUTPATIENT
Start: 2020-01-01 | End: 2020-01-01

## 2020-01-01 RX ORDER — MAGNESIUM SULFATE HEPTAHYDRATE 40 MG/ML
2 INJECTION, SOLUTION INTRAVENOUS
Status: ACTIVE | OUTPATIENT
Start: 2020-01-01 | End: 2020-01-01

## 2020-01-01 RX ORDER — ONDANSETRON 2 MG/ML
4 INJECTION INTRAMUSCULAR; INTRAVENOUS EVERY 8 HOURS PRN
Status: DISCONTINUED | OUTPATIENT
Start: 2020-01-01 | End: 2020-01-01 | Stop reason: HOSPADM

## 2020-01-01 RX ORDER — SENNOSIDES 8.6 MG/1
8.6 TABLET ORAL DAILY
Status: DISCONTINUED | OUTPATIENT
Start: 2020-01-01 | End: 2020-01-01

## 2020-01-01 RX ORDER — POLYETHYLENE GLYCOL 3350 17 G/17G
17 POWDER, FOR SOLUTION ORAL DAILY
Status: DISCONTINUED | OUTPATIENT
Start: 2020-01-01 | End: 2020-01-01

## 2020-01-01 RX ORDER — ETOMIDATE 2 MG/ML
INJECTION INTRAVENOUS
Status: DISPENSED
Start: 2020-01-01 | End: 2020-01-01

## 2020-01-01 RX ORDER — ALBUTEROL SULFATE 90 UG/1
2 AEROSOL, METERED RESPIRATORY (INHALATION) EVERY 4 HOURS PRN
Status: DISCONTINUED | OUTPATIENT
Start: 2020-01-01 | End: 2020-01-01

## 2020-01-01 RX ORDER — ACETAMINOPHEN 325 MG/1
650 TABLET ORAL EVERY 4 HOURS PRN
Status: DISCONTINUED | OUTPATIENT
Start: 2020-01-01 | End: 2020-01-01

## 2020-01-01 RX ORDER — METOPROLOL TARTRATE 1 MG/ML
5 INJECTION, SOLUTION INTRAVENOUS EVERY 5 MIN PRN
Status: DISCONTINUED | OUTPATIENT
Start: 2020-01-01 | End: 2020-01-01

## 2020-01-01 RX ORDER — GLUCAGON 1 MG
1 KIT INJECTION
Status: DISCONTINUED | OUTPATIENT
Start: 2020-01-01 | End: 2020-01-01

## 2020-01-01 RX ORDER — ALBUTEROL SULFATE 90 UG/1
2 AEROSOL, METERED RESPIRATORY (INHALATION) EVERY 6 HOURS PRN
Status: DISCONTINUED | OUTPATIENT
Start: 2020-01-01 | End: 2020-01-01

## 2020-01-01 RX ORDER — HEPARIN SODIUM 5000 [USP'U]/ML
5000 INJECTION, SOLUTION INTRAVENOUS; SUBCUTANEOUS EVERY 12 HOURS
Status: DISCONTINUED | OUTPATIENT
Start: 2020-01-01 | End: 2020-01-01

## 2020-01-01 RX ORDER — ACETAMINOPHEN 325 MG/1
650 TABLET ORAL EVERY 6 HOURS
Status: COMPLETED | OUTPATIENT
Start: 2020-01-01 | End: 2020-01-01

## 2020-01-01 RX ORDER — SUCCINYLCHOLINE CHLORIDE 20 MG/ML
INJECTION INTRAMUSCULAR; INTRAVENOUS
Status: DISPENSED
Start: 2020-01-01 | End: 2020-01-01

## 2020-01-01 RX ORDER — INSULIN ASPART 100 [IU]/ML
0-5 INJECTION, SOLUTION INTRAVENOUS; SUBCUTANEOUS
Status: DISCONTINUED | OUTPATIENT
Start: 2020-01-01 | End: 2020-01-01

## 2020-01-01 RX ORDER — INSULIN ASPART 100 [IU]/ML
5 INJECTION, SOLUTION INTRAVENOUS; SUBCUTANEOUS EVERY 6 HOURS
Status: DISCONTINUED | OUTPATIENT
Start: 2020-01-01 | End: 2020-01-01

## 2020-01-01 RX ORDER — MORPHINE SULFATE 2 MG/ML
1 INJECTION, SOLUTION INTRAMUSCULAR; INTRAVENOUS EVERY 4 HOURS PRN
Status: DISCONTINUED | OUTPATIENT
Start: 2020-01-01 | End: 2020-01-01 | Stop reason: HOSPADM

## 2020-01-01 RX ORDER — MORPHINE SULFATE 2 MG/ML
1 INJECTION, SOLUTION INTRAMUSCULAR; INTRAVENOUS
Status: DISCONTINUED | OUTPATIENT
Start: 2020-01-01 | End: 2020-01-01

## 2020-01-01 RX ORDER — FUROSEMIDE 10 MG/ML
60 INJECTION INTRAMUSCULAR; INTRAVENOUS ONCE
Status: COMPLETED | OUTPATIENT
Start: 2020-01-01 | End: 2020-01-01

## 2020-01-01 RX ADMIN — SODIUM CHLORIDE: 0.9 INJECTION, SOLUTION INTRAVENOUS at 12:03

## 2020-01-01 RX ADMIN — GLYCOPYRROLATE 0.2 MG: 0.2 INJECTION, SOLUTION INTRAMUSCULAR; INTRAVENOUS at 06:04

## 2020-01-01 RX ADMIN — FENTANYL CITRATE 50 MCG: 50 INJECTION INTRAMUSCULAR; INTRAVENOUS at 12:03

## 2020-01-01 RX ADMIN — IPRATROPIUM BROMIDE AND ALBUTEROL SULFATE 3 ML: .5; 3 SOLUTION RESPIRATORY (INHALATION) at 08:04

## 2020-01-01 RX ADMIN — Medication: at 11:03

## 2020-01-01 RX ADMIN — ACETAMINOPHEN 650 MG: 325 TABLET ORAL at 05:03

## 2020-01-01 RX ADMIN — DEXTROSE: 10 SOLUTION INTRAVENOUS at 12:03

## 2020-01-01 RX ADMIN — INSULIN ASPART 1 UNITS: 100 INJECTION, SOLUTION INTRAVENOUS; SUBCUTANEOUS at 12:03

## 2020-01-01 RX ADMIN — FENOFIBRATE 145 MG: 145 TABLET ORAL at 08:03

## 2020-01-01 RX ADMIN — GUAIFENESIN AND DEXTROMETHORPHAN HYDROBROMIDE 2 TABLET: 600; 30 TABLET, EXTENDED RELEASE ORAL at 10:03

## 2020-01-01 RX ADMIN — INSULIN ASPART 5 UNITS: 100 INJECTION, SOLUTION INTRAVENOUS; SUBCUTANEOUS at 12:04

## 2020-01-01 RX ADMIN — IPRATROPIUM BROMIDE AND ALBUTEROL SULFATE 3 ML: .5; 3 SOLUTION RESPIRATORY (INHALATION) at 12:04

## 2020-01-01 RX ADMIN — ATORVASTATIN CALCIUM 40 MG: 40 TABLET, FILM COATED ORAL at 08:03

## 2020-01-01 RX ADMIN — CLOPIDOGREL BISULFATE 75 MG: 75 TABLET ORAL at 08:04

## 2020-01-01 RX ADMIN — HEPARIN SODIUM 5000 UNITS: 5000 INJECTION INTRAVENOUS; SUBCUTANEOUS at 08:04

## 2020-01-01 RX ADMIN — MIDODRINE HYDROCHLORIDE 10 MG: 5 TABLET ORAL at 11:04

## 2020-01-01 RX ADMIN — IPRATROPIUM BROMIDE AND ALBUTEROL SULFATE 3 ML: .5; 3 SOLUTION RESPIRATORY (INHALATION) at 04:04

## 2020-01-01 RX ADMIN — CARVEDILOL 6.25 MG: 6.25 TABLET, FILM COATED ORAL at 12:03

## 2020-01-01 RX ADMIN — SODIUM CITRATE AND CITRIC ACID MONOHYDRATE 30 ML: 500; 334 SOLUTION ORAL at 06:03

## 2020-01-01 RX ADMIN — MORPHINE SULFATE 15 MG/HR: 10 INJECTION INTRAVENOUS at 09:04

## 2020-01-01 RX ADMIN — PROPOFOL 25 MCG/KG/MIN: 10 INJECTION, EMULSION INTRAVENOUS at 03:03

## 2020-01-01 RX ADMIN — ATORVASTATIN CALCIUM 40 MG: 40 TABLET, FILM COATED ORAL at 09:03

## 2020-01-01 RX ADMIN — DEXMEDETOMIDINE HYDROCHLORIDE 1.4 MCG/KG/HR: 4 INJECTION, SOLUTION INTRAVENOUS at 10:04

## 2020-01-01 RX ADMIN — LORAZEPAM 1 MG: 2 INJECTION INTRAMUSCULAR; INTRAVENOUS at 06:04

## 2020-01-01 RX ADMIN — DEXTROSE 250 ML: 10 SOLUTION INTRAVENOUS at 12:03

## 2020-01-01 RX ADMIN — INSULIN ASPART 10 UNITS: 100 INJECTION, SOLUTION INTRAVENOUS; SUBCUTANEOUS at 02:03

## 2020-01-01 RX ADMIN — FUROSEMIDE 10 MG/HR: 10 INJECTION, SOLUTION INTRAVENOUS at 09:03

## 2020-01-01 RX ADMIN — AZITHROMYCIN 500 MG: 250 TABLET, FILM COATED ORAL at 09:03

## 2020-01-01 RX ADMIN — MORPHINE SULFATE 2 MG: 2 INJECTION, SOLUTION INTRAMUSCULAR; INTRAVENOUS at 11:04

## 2020-01-01 RX ADMIN — LORAZEPAM 2 MG: 2 INJECTION INTRAMUSCULAR; INTRAVENOUS at 05:04

## 2020-01-01 RX ADMIN — CALCIUM GLUCONATE 1 G: 98 INJECTION, SOLUTION INTRAVENOUS at 04:04

## 2020-01-01 RX ADMIN — SODIUM BICARBONATE 25 MEQ: 84 INJECTION, SOLUTION INTRAVENOUS at 10:03

## 2020-01-01 RX ADMIN — POLYETHYLENE GLYCOL 3350 17 G: 17 POWDER, FOR SOLUTION ORAL at 08:04

## 2020-01-01 RX ADMIN — INSULIN ASPART 5 UNITS: 100 INJECTION, SOLUTION INTRAVENOUS; SUBCUTANEOUS at 06:03

## 2020-01-01 RX ADMIN — CLOPIDOGREL BISULFATE 75 MG: 75 TABLET ORAL at 08:03

## 2020-01-01 RX ADMIN — SODIUM CITRATE AND CITRIC ACID MONOHYDRATE 30 ML: 500; 334 SOLUTION ORAL at 10:04

## 2020-01-01 RX ADMIN — FAMOTIDINE 20 MG: 10 INJECTION, SOLUTION INTRAVENOUS at 08:04

## 2020-01-01 RX ADMIN — SODIUM CITRATE AND CITRIC ACID MONOHYDRATE 30 ML: 500; 334 SOLUTION ORAL at 09:03

## 2020-01-01 RX ADMIN — LORAZEPAM 1 MG: 2 INJECTION INTRAMUSCULAR; INTRAVENOUS at 01:04

## 2020-01-01 RX ADMIN — INSULIN HUMAN 7.39 UNITS: 100 INJECTION, SOLUTION PARENTERAL at 04:04

## 2020-01-01 RX ADMIN — LORAZEPAM 1 MG: 2 INJECTION INTRAMUSCULAR; INTRAVENOUS at 02:04

## 2020-01-01 RX ADMIN — CHLORHEXIDINE GLUCONATE 0.12% ORAL RINSE 15 ML: 1.2 LIQUID ORAL at 10:03

## 2020-01-01 RX ADMIN — SODIUM CITRATE AND CITRIC ACID MONOHYDRATE 30 ML: 500; 334 SOLUTION ORAL at 12:04

## 2020-01-01 RX ADMIN — DEXMEDETOMIDINE HYDROCHLORIDE 1.4 MCG/KG/HR: 4 INJECTION, SOLUTION INTRAVENOUS at 04:04

## 2020-01-01 RX ADMIN — INSULIN ASPART 3 UNITS: 100 INJECTION, SOLUTION INTRAVENOUS; SUBCUTANEOUS at 06:03

## 2020-01-01 RX ADMIN — FENTANYL CITRATE 50 MCG: 50 INJECTION INTRAMUSCULAR; INTRAVENOUS at 07:04

## 2020-01-01 RX ADMIN — INSULIN HUMAN 7.39 UNITS: 100 INJECTION, SOLUTION PARENTERAL at 10:04

## 2020-01-01 RX ADMIN — GLYCOPYRROLATE 0.2 MG: 0.2 INJECTION, SOLUTION INTRAMUSCULAR; INTRAVENOUS at 05:04

## 2020-01-01 RX ADMIN — SENNOSIDES 8.6 MG: 8.6 TABLET, FILM COATED ORAL at 11:03

## 2020-01-01 RX ADMIN — FENOFIBRATE 145 MG: 145 TABLET ORAL at 10:03

## 2020-01-01 RX ADMIN — DEXMEDETOMIDINE HYDROCHLORIDE 0.6 MCG/KG/HR: 4 INJECTION, SOLUTION INTRAVENOUS at 05:03

## 2020-01-01 RX ADMIN — INSULIN ASPART 4 UNITS: 100 INJECTION, SOLUTION INTRAVENOUS; SUBCUTANEOUS at 05:03

## 2020-01-01 RX ADMIN — FAMOTIDINE 20 MG: 10 INJECTION, SOLUTION INTRAVENOUS at 09:03

## 2020-01-01 RX ADMIN — CLOPIDOGREL BISULFATE 75 MG: 75 TABLET ORAL at 12:04

## 2020-01-01 RX ADMIN — IPRATROPIUM BROMIDE AND ALBUTEROL SULFATE 3 ML: .5; 3 SOLUTION RESPIRATORY (INHALATION) at 03:04

## 2020-01-01 RX ADMIN — Medication: at 12:04

## 2020-01-01 RX ADMIN — BENZONATATE 100 MG: 100 CAPSULE ORAL at 09:03

## 2020-01-01 RX ADMIN — LORAZEPAM 2 MG: 2 INJECTION INTRAMUSCULAR at 02:04

## 2020-01-01 RX ADMIN — FENOFIBRATE 145 MG: 145 TABLET ORAL at 09:03

## 2020-01-01 RX ADMIN — SENNOSIDES 8.6 MG: 8.6 TABLET, FILM COATED ORAL at 08:04

## 2020-01-01 RX ADMIN — GUAIFENESIN AND DEXTROMETHORPHAN HYDROBROMIDE 2 TABLET: 600; 30 TABLET, EXTENDED RELEASE ORAL at 09:03

## 2020-01-01 RX ADMIN — PROPOFOL 10 MCG/KG/MIN: 10 INJECTION, EMULSION INTRAVENOUS at 07:03

## 2020-01-01 RX ADMIN — POLYETHYLENE GLYCOL 3350 17 G: 17 POWDER, FOR SOLUTION ORAL at 11:03

## 2020-01-01 RX ADMIN — IPRATROPIUM BROMIDE AND ALBUTEROL SULFATE 3 ML: .5; 3 SOLUTION RESPIRATORY (INHALATION) at 01:03

## 2020-01-01 RX ADMIN — SODIUM CITRATE AND CITRIC ACID MONOHYDRATE 30 ML: 500; 334 SOLUTION ORAL at 02:03

## 2020-01-01 RX ADMIN — DEXMEDETOMIDINE HYDROCHLORIDE 1.4 MCG/KG/HR: 4 INJECTION, SOLUTION INTRAVENOUS at 02:04

## 2020-01-01 RX ADMIN — ALBUTEROL SULFATE 2 PUFF: 90 AEROSOL, METERED RESPIRATORY (INHALATION) at 07:03

## 2020-01-01 RX ADMIN — IPRATROPIUM BROMIDE AND ALBUTEROL SULFATE 3 ML: .5; 3 SOLUTION RESPIRATORY (INHALATION) at 04:03

## 2020-01-01 RX ADMIN — MIDODRINE HYDROCHLORIDE 10 MG: 5 TABLET ORAL at 03:04

## 2020-01-01 RX ADMIN — ATORVASTATIN CALCIUM 40 MG: 40 TABLET, FILM COATED ORAL at 10:04

## 2020-01-01 RX ADMIN — HYDROXYCHLOROQUINE SULFATE 400 MG: 200 TABLET, FILM COATED ORAL at 10:03

## 2020-01-01 RX ADMIN — INSULIN ASPART 3 UNITS: 100 INJECTION, SOLUTION INTRAVENOUS; SUBCUTANEOUS at 03:04

## 2020-01-01 RX ADMIN — Medication 25 MCG/HR: at 07:04

## 2020-01-01 RX ADMIN — HEPARIN SODIUM 2000 UNITS: 1000 INJECTION, SOLUTION INTRAVENOUS; SUBCUTANEOUS at 01:03

## 2020-01-01 RX ADMIN — CHLORHEXIDINE GLUCONATE 0.12% ORAL RINSE 15 ML: 1.2 LIQUID ORAL at 08:03

## 2020-01-01 RX ADMIN — ALTEPLASE 2 MG: 2.2 INJECTION, POWDER, LYOPHILIZED, FOR SOLUTION INTRAVENOUS at 01:04

## 2020-01-01 RX ADMIN — HEPARIN SODIUM 5000 UNITS: 5000 INJECTION, SOLUTION INTRAVENOUS; SUBCUTANEOUS at 10:03

## 2020-01-01 RX ADMIN — ALTEPLASE 2 MG: 2.2 INJECTION, POWDER, LYOPHILIZED, FOR SOLUTION INTRAVENOUS at 03:03

## 2020-01-01 RX ADMIN — POLYETHYLENE GLYCOL 3350 17 G: 17 POWDER, FOR SOLUTION ORAL at 09:04

## 2020-01-01 RX ADMIN — METOPROLOL SUCCINATE 50 MG: 50 TABLET, EXTENDED RELEASE ORAL at 09:03

## 2020-01-01 RX ADMIN — FAMOTIDINE 20 MG: 10 INJECTION, SOLUTION INTRAVENOUS at 08:03

## 2020-01-01 RX ADMIN — AZITHROMYCIN 500 MG: 250 TABLET, FILM COATED ORAL at 08:03

## 2020-01-01 RX ADMIN — ALTEPLASE 2 MG: 2.2 INJECTION, POWDER, LYOPHILIZED, FOR SOLUTION INTRAVENOUS at 05:03

## 2020-01-01 RX ADMIN — SENNOSIDES 8.6 MG: 8.6 TABLET, FILM COATED ORAL at 09:04

## 2020-01-01 RX ADMIN — MORPHINE SULFATE 2 MG: 2 INJECTION, SOLUTION INTRAMUSCULAR; INTRAVENOUS at 06:04

## 2020-01-01 RX ADMIN — SODIUM CITRATE AND CITRIC ACID MONOHYDRATE 30 ML: 500; 334 SOLUTION ORAL at 09:04

## 2020-01-01 RX ADMIN — HEPARIN SODIUM 5000 UNITS: 5000 INJECTION, SOLUTION INTRAVENOUS; SUBCUTANEOUS at 09:03

## 2020-01-01 RX ADMIN — AZITHROMYCIN 500 MG: 250 TABLET, FILM COATED ORAL at 10:03

## 2020-01-01 RX ADMIN — LORAZEPAM 2 MG: 2 INJECTION INTRAMUSCULAR; INTRAVENOUS at 09:04

## 2020-01-01 RX ADMIN — CEFTRIAXONE 1 G: 1 INJECTION, SOLUTION INTRAVENOUS at 05:03

## 2020-01-01 RX ADMIN — CHLORHEXIDINE GLUCONATE 0.12% ORAL RINSE 15 ML: 1.2 LIQUID ORAL at 09:03

## 2020-01-01 RX ADMIN — DEXMEDETOMIDINE HYDROCHLORIDE 1.4 MCG/KG/HR: 4 INJECTION, SOLUTION INTRAVENOUS at 07:03

## 2020-01-01 RX ADMIN — MORPHINE SULFATE 15 MG/HR: 10 INJECTION INTRAVENOUS at 05:04

## 2020-01-01 RX ADMIN — IPRATROPIUM BROMIDE AND ALBUTEROL SULFATE 3 ML: .5; 3 SOLUTION RESPIRATORY (INHALATION) at 07:04

## 2020-01-01 RX ADMIN — SODIUM CITRATE AND CITRIC ACID MONOHYDRATE 30 ML: 500; 334 SOLUTION ORAL at 03:04

## 2020-01-01 RX ADMIN — INSULIN ASPART 5 UNITS: 100 INJECTION, SOLUTION INTRAVENOUS; SUBCUTANEOUS at 11:03

## 2020-01-01 RX ADMIN — CHLORHEXIDINE GLUCONATE 0.12% ORAL RINSE 15 ML: 1.2 LIQUID ORAL at 08:04

## 2020-01-01 RX ADMIN — MIDODRINE HYDROCHLORIDE 10 MG: 5 TABLET ORAL at 12:04

## 2020-01-01 RX ADMIN — MIDODRINE HYDROCHLORIDE 10 MG: 5 TABLET ORAL at 10:04

## 2020-01-01 RX ADMIN — SODIUM CHLORIDE, SODIUM LACTATE, POTASSIUM CHLORIDE, AND CALCIUM CHLORIDE 200 ML: .6; .31; .03; .02 INJECTION, SOLUTION INTRAVENOUS at 09:04

## 2020-01-01 RX ADMIN — DEXMEDETOMIDINE HYDROCHLORIDE 1 MCG/KG/HR: 4 INJECTION, SOLUTION INTRAVENOUS at 02:04

## 2020-01-01 RX ADMIN — HEPARIN SODIUM 5000 UNITS: 5000 INJECTION, SOLUTION INTRAVENOUS; SUBCUTANEOUS at 09:04

## 2020-01-01 RX ADMIN — CHLORHEXIDINE GLUCONATE 0.12% ORAL RINSE 15 ML: 1.2 LIQUID ORAL at 09:04

## 2020-01-01 RX ADMIN — ALBUTEROL SULFATE 2 PUFF: 90 AEROSOL, METERED RESPIRATORY (INHALATION) at 06:03

## 2020-01-01 RX ADMIN — VANCOMYCIN HYDROCHLORIDE 1500 MG: 1.5 INJECTION, POWDER, LYOPHILIZED, FOR SOLUTION INTRAVENOUS at 01:04

## 2020-01-01 RX ADMIN — MORPHINE SULFATE 2 MG: 2 INJECTION, SOLUTION INTRAMUSCULAR; INTRAVENOUS at 02:04

## 2020-01-01 RX ADMIN — SODIUM CITRATE AND CITRIC ACID MONOHYDRATE 30 ML: 500; 334 SOLUTION ORAL at 05:03

## 2020-01-01 RX ADMIN — INSULIN HUMAN 10 UNITS: 100 INJECTION, SOLUTION PARENTERAL at 09:03

## 2020-01-01 RX ADMIN — INSULIN ASPART 5 UNITS: 100 INJECTION, SOLUTION INTRAVENOUS; SUBCUTANEOUS at 06:04

## 2020-01-01 RX ADMIN — LORAZEPAM 1 MG: 2 INJECTION INTRAMUSCULAR; INTRAVENOUS at 03:04

## 2020-01-01 RX ADMIN — LORAZEPAM 2 MG: 2 INJECTION INTRAMUSCULAR; INTRAVENOUS at 02:04

## 2020-01-01 RX ADMIN — CHLORHEXIDINE GLUCONATE 0.12% ORAL RINSE 15 ML: 1.2 LIQUID ORAL at 10:04

## 2020-01-01 RX ADMIN — LISINOPRIL 40 MG: 20 TABLET ORAL at 09:03

## 2020-01-01 RX ADMIN — MORPHINE SULFATE 2 MG: 2 INJECTION, SOLUTION INTRAMUSCULAR; INTRAVENOUS at 07:04

## 2020-01-01 RX ADMIN — DEXMEDETOMIDINE HYDROCHLORIDE 1.2 MCG/KG/HR: 4 INJECTION, SOLUTION INTRAVENOUS at 03:03

## 2020-01-01 RX ADMIN — GUAIFENESIN AND DEXTROMETHORPHAN HYDROBROMIDE 2 TABLET: 600; 30 TABLET, EXTENDED RELEASE ORAL at 08:03

## 2020-01-01 RX ADMIN — METOPROLOL TARTRATE 5 MG: 5 INJECTION, SOLUTION INTRAVENOUS at 06:04

## 2020-01-01 RX ADMIN — IPRATROPIUM BROMIDE AND ALBUTEROL SULFATE 3 ML: .5; 3 SOLUTION RESPIRATORY (INHALATION) at 08:03

## 2020-01-01 RX ADMIN — INSULIN ASPART 2 UNITS: 100 INJECTION, SOLUTION INTRAVENOUS; SUBCUTANEOUS at 06:04

## 2020-01-01 RX ADMIN — GLYCOPYRROLATE 0.2 MG: 0.2 INJECTION, SOLUTION INTRAMUSCULAR; INTRAVENOUS at 01:04

## 2020-01-01 RX ADMIN — DEXTROSE MONOHYDRATE 25 G: 500 INJECTION PARENTERAL at 04:04

## 2020-01-01 RX ADMIN — MORPHINE SULFATE 1 MG/HR: 10 INJECTION INTRAVENOUS at 11:04

## 2020-01-01 RX ADMIN — ATORVASTATIN CALCIUM 40 MG: 40 TABLET, FILM COATED ORAL at 09:04

## 2020-01-01 RX ADMIN — MIDODRINE HYDROCHLORIDE 10 MG: 5 TABLET ORAL at 09:04

## 2020-01-01 RX ADMIN — FUROSEMIDE 80 MG: 10 INJECTION, SOLUTION INTRAMUSCULAR; INTRAVENOUS at 09:03

## 2020-01-01 RX ADMIN — AMLODIPINE BESYLATE 2.5 MG: 2.5 TABLET ORAL at 09:03

## 2020-01-01 RX ADMIN — CEFEPIME HYDROCHLORIDE 2 G: 2 INJECTION, SOLUTION INTRAVENOUS at 10:04

## 2020-01-01 RX ADMIN — DEXMEDETOMIDINE HYDROCHLORIDE 1.4 MCG/KG/HR: 4 INJECTION, SOLUTION INTRAVENOUS at 09:04

## 2020-01-01 RX ADMIN — Medication 150 MCG/HR: at 07:03

## 2020-01-01 RX ADMIN — FENOFIBRATE 145 MG: 145 TABLET ORAL at 12:04

## 2020-01-01 RX ADMIN — DEXTROSE: 5 SOLUTION INTRAVENOUS at 07:04

## 2020-01-01 RX ADMIN — MORPHINE SULFATE 2 MG: 2 INJECTION, SOLUTION INTRAMUSCULAR; INTRAVENOUS at 05:04

## 2020-01-01 RX ADMIN — SODIUM CHLORIDE, SODIUM LACTATE, POTASSIUM CHLORIDE, AND CALCIUM CHLORIDE 1000 ML: .6; .31; .03; .02 INJECTION, SOLUTION INTRAVENOUS at 12:04

## 2020-01-01 RX ADMIN — SODIUM CITRATE AND CITRIC ACID MONOHYDRATE 30 ML: 500; 334 SOLUTION ORAL at 06:04

## 2020-01-01 RX ADMIN — METOPROLOL SUCCINATE 50 MG: 50 TABLET, EXTENDED RELEASE ORAL at 10:03

## 2020-01-01 RX ADMIN — FENTANYL CITRATE 50 MCG: 50 INJECTION INTRAMUSCULAR; INTRAVENOUS at 04:04

## 2020-01-01 RX ADMIN — FENTANYL CITRATE 50 MCG: 50 INJECTION INTRAMUSCULAR; INTRAVENOUS at 05:03

## 2020-01-01 RX ADMIN — FAMOTIDINE 20 MG: 10 INJECTION, SOLUTION INTRAVENOUS at 12:04

## 2020-01-01 RX ADMIN — DEXMEDETOMIDINE HYDROCHLORIDE 0.2 MCG/KG/HR: 4 INJECTION, SOLUTION INTRAVENOUS at 03:03

## 2020-01-01 RX ADMIN — MORPHINE SULFATE 15 MG/HR: 10 INJECTION INTRAVENOUS at 06:04

## 2020-01-01 RX ADMIN — DEXMEDETOMIDINE HYDROCHLORIDE 1 MCG/KG/HR: 4 INJECTION, SOLUTION INTRAVENOUS at 09:03

## 2020-01-01 RX ADMIN — METOPROLOL TARTRATE 5 MG: 5 INJECTION, SOLUTION INTRAVENOUS at 08:04

## 2020-01-01 RX ADMIN — METOPROLOL TARTRATE 5 MG: 5 INJECTION, SOLUTION INTRAVENOUS at 09:04

## 2020-01-01 RX ADMIN — CLOPIDOGREL BISULFATE 75 MG: 75 TABLET ORAL at 10:03

## 2020-01-01 RX ADMIN — DEXMEDETOMIDINE HYDROCHLORIDE 1 MCG/KG/HR: 4 INJECTION, SOLUTION INTRAVENOUS at 10:03

## 2020-01-01 RX ADMIN — INSULIN ASPART 3 UNITS: 100 INJECTION, SOLUTION INTRAVENOUS; SUBCUTANEOUS at 09:03

## 2020-01-01 RX ADMIN — DEXTROSE: 10 SOLUTION INTRAVENOUS at 05:03

## 2020-01-01 RX ADMIN — ALTEPLASE 2 MG: 2.2 INJECTION, POWDER, LYOPHILIZED, FOR SOLUTION INTRAVENOUS at 12:04

## 2020-01-01 RX ADMIN — METOPROLOL TARTRATE 5 MG: 5 INJECTION, SOLUTION INTRAVENOUS at 10:04

## 2020-01-01 RX ADMIN — SODIUM CITRATE AND CITRIC ACID MONOHYDRATE 30 ML: 500; 334 SOLUTION ORAL at 05:04

## 2020-01-01 RX ADMIN — DEXMEDETOMIDINE HYDROCHLORIDE 1.4 MCG/KG/HR: 4 INJECTION, SOLUTION INTRAVENOUS at 06:04

## 2020-01-01 RX ADMIN — FAMOTIDINE 20 MG: 10 INJECTION, SOLUTION INTRAVENOUS at 09:04

## 2020-01-01 RX ADMIN — BENZONATATE 200 MG: 100 CAPSULE ORAL at 08:03

## 2020-01-01 RX ADMIN — SENNOSIDES 8.6 MG: 8.6 TABLET, FILM COATED ORAL at 12:04

## 2020-01-01 RX ADMIN — KETAMINE HYDROCHLORIDE 2.5 MCG/KG/MIN: 100 INJECTION INTRAMUSCULAR; INTRAVENOUS at 07:04

## 2020-01-01 RX ADMIN — LORAZEPAM 2 MG: 2 INJECTION INTRAMUSCULAR at 04:04

## 2020-01-01 RX ADMIN — Medication 150 MCG/HR: at 01:03

## 2020-01-01 RX ADMIN — HEPARIN SODIUM 750 UNITS/HR: 10000 INJECTION, SOLUTION INTRAVENOUS at 11:04

## 2020-01-01 RX ADMIN — MORPHINE SULFATE 2 MG: 2 INJECTION, SOLUTION INTRAMUSCULAR; INTRAVENOUS at 01:04

## 2020-01-01 RX ADMIN — SODIUM CITRATE AND CITRIC ACID MONOHYDRATE 30 ML: 500; 334 SOLUTION ORAL at 01:04

## 2020-01-01 RX ADMIN — FENTANYL CITRATE 50 MCG: 50 INJECTION INTRAMUSCULAR; INTRAVENOUS at 06:04

## 2020-01-01 RX ADMIN — HEPARIN SODIUM 5000 UNITS: 5000 INJECTION, SOLUTION INTRAVENOUS; SUBCUTANEOUS at 08:04

## 2020-01-01 RX ADMIN — BENZONATATE 200 MG: 100 CAPSULE ORAL at 07:03

## 2020-01-01 RX ADMIN — Medication 100 MCG/HR: at 03:04

## 2020-01-01 RX ADMIN — AMLODIPINE BESYLATE 2.5 MG: 2.5 TABLET ORAL at 10:03

## 2020-01-01 RX ADMIN — PROPOFOL 15 MCG/KG/MIN: 10 INJECTION, EMULSION INTRAVENOUS at 05:04

## 2020-01-01 RX ADMIN — DEXTROSE 250 ML: 10 SOLUTION INTRAVENOUS at 06:03

## 2020-01-01 RX ADMIN — ATORVASTATIN CALCIUM 40 MG: 40 TABLET, FILM COATED ORAL at 10:03

## 2020-01-01 RX ADMIN — INSULIN ASPART 3 UNITS: 100 INJECTION, SOLUTION INTRAVENOUS; SUBCUTANEOUS at 02:03

## 2020-01-01 RX ADMIN — DEXMEDETOMIDINE HYDROCHLORIDE 1.4 MCG/KG/HR: 4 INJECTION, SOLUTION INTRAVENOUS at 01:04

## 2020-01-01 RX ADMIN — HEPARIN SODIUM 5000 UNITS: 5000 INJECTION, SOLUTION INTRAVENOUS; SUBCUTANEOUS at 08:03

## 2020-01-01 RX ADMIN — HEPARIN SODIUM 5000 UNITS: 5000 INJECTION INTRAVENOUS; SUBCUTANEOUS at 11:04

## 2020-01-01 RX ADMIN — INSULIN DETEMIR 20 UNITS: 100 INJECTION, SOLUTION SUBCUTANEOUS at 09:03

## 2020-01-01 RX ADMIN — KETAMINE HYDROCHLORIDE 15 MCG/KG/MIN: 100 INJECTION INTRAMUSCULAR; INTRAVENOUS at 08:04

## 2020-01-01 RX ADMIN — MIDODRINE HYDROCHLORIDE 10 MG: 5 TABLET ORAL at 08:04

## 2020-01-01 RX ADMIN — CLOPIDOGREL BISULFATE 75 MG: 75 TABLET ORAL at 09:03

## 2020-01-01 RX ADMIN — CARVEDILOL 6.25 MG: 6.25 TABLET, FILM COATED ORAL at 09:04

## 2020-01-01 RX ADMIN — DEXTROSE 125 ML: 10 SOLUTION INTRAVENOUS at 01:03

## 2020-01-01 RX ADMIN — LISINOPRIL 40 MG: 20 TABLET ORAL at 10:03

## 2020-01-01 RX ADMIN — DEXMEDETOMIDINE HYDROCHLORIDE 1.4 MCG/KG/HR: 4 INJECTION, SOLUTION INTRAVENOUS at 12:04

## 2020-01-01 RX ADMIN — HYDROXYCHLOROQUINE SULFATE 200 MG: 200 TABLET, FILM COATED ORAL at 09:03

## 2020-01-01 RX ADMIN — INSULIN ASPART 2 UNITS: 100 INJECTION, SOLUTION INTRAVENOUS; SUBCUTANEOUS at 01:04

## 2020-01-01 RX ADMIN — INSULIN ASPART 3 UNITS: 100 INJECTION, SOLUTION INTRAVENOUS; SUBCUTANEOUS at 01:03

## 2020-01-01 RX ADMIN — GLYCOPYRROLATE 0.2 MG: 0.2 INJECTION, SOLUTION INTRAMUSCULAR; INTRAVENOUS at 02:04

## 2020-01-01 RX ADMIN — LIDOCAINE HYDROCHLORIDE 200 MG: 10 INJECTION, SOLUTION INFILTRATION; PERINEURAL at 01:03

## 2020-01-01 RX ADMIN — FUROSEMIDE 80 MG: 10 INJECTION, SOLUTION INTRAVENOUS at 10:04

## 2020-01-01 RX ADMIN — ALBUTEROL SULFATE 2 PUFF: 90 AEROSOL, METERED RESPIRATORY (INHALATION) at 04:03

## 2020-01-01 RX ADMIN — FUROSEMIDE 10 MG/HR: 10 INJECTION, SOLUTION INTRAVENOUS at 08:04

## 2020-01-01 RX ADMIN — ATORVASTATIN CALCIUM 40 MG: 40 TABLET, FILM COATED ORAL at 08:04

## 2020-01-01 RX ADMIN — Medication 225 MCG/HR: at 09:04

## 2020-01-01 RX ADMIN — CARVEDILOL 6.25 MG: 6.25 TABLET, FILM COATED ORAL at 08:03

## 2020-01-01 RX ADMIN — DEXMEDETOMIDINE HYDROCHLORIDE 1.4 MCG/KG/HR: 4 INJECTION, SOLUTION INTRAVENOUS at 04:03

## 2020-01-01 RX ADMIN — ALTEPLASE 2 MG: 2.2 INJECTION, POWDER, LYOPHILIZED, FOR SOLUTION INTRAVENOUS at 09:03

## 2020-01-01 RX ADMIN — SODIUM CITRATE AND CITRIC ACID MONOHYDRATE 30 ML: 500; 334 SOLUTION ORAL at 03:03

## 2020-01-01 RX ADMIN — PROPOFOL 10 MCG/KG/MIN: 10 INJECTION, EMULSION INTRAVENOUS at 03:03

## 2020-01-01 RX ADMIN — METOPROLOL TARTRATE 5 MG: 5 INJECTION, SOLUTION INTRAVENOUS at 04:04

## 2020-01-01 RX ADMIN — INSULIN ASPART 5 UNITS: 100 INJECTION, SOLUTION INTRAVENOUS; SUBCUTANEOUS at 05:03

## 2020-01-01 RX ADMIN — CEFEPIME HYDROCHLORIDE 2 G: 2 INJECTION, SOLUTION INTRAVENOUS at 11:04

## 2020-01-01 RX ADMIN — CARVEDILOL 6.25 MG: 6.25 TABLET, FILM COATED ORAL at 09:03

## 2020-01-01 RX ADMIN — MIDODRINE HYDROCHLORIDE 10 MG: 5 TABLET ORAL at 05:04

## 2020-01-01 RX ADMIN — ACETAMINOPHEN 650 MG: 325 TABLET ORAL at 09:04

## 2020-01-01 RX ADMIN — PROPOFOL 30 MCG/KG/MIN: 10 INJECTION, EMULSION INTRAVENOUS at 09:03

## 2020-01-01 RX ADMIN — LORAZEPAM 2 MG: 2 INJECTION INTRAMUSCULAR at 03:04

## 2020-01-01 RX ADMIN — INSULIN ASPART 5 UNITS: 100 INJECTION, SOLUTION INTRAVENOUS; SUBCUTANEOUS at 12:03

## 2020-01-01 RX ADMIN — DEXTROSE 250 ML: 10 SOLUTION INTRAVENOUS at 09:03

## 2020-01-01 RX ADMIN — CLOPIDOGREL BISULFATE 75 MG: 75 TABLET ORAL at 09:04

## 2020-01-01 RX ADMIN — ACETAMINOPHEN 650 MG: 325 TABLET ORAL at 06:03

## 2020-01-01 RX ADMIN — PROPOFOL 5 MCG/KG/MIN: 10 INJECTION, EMULSION INTRAVENOUS at 01:03

## 2020-01-01 RX ADMIN — ALBUTEROL SULFATE 2 PUFF: 90 AEROSOL, METERED RESPIRATORY (INHALATION) at 08:03

## 2020-01-01 RX ADMIN — METOPROLOL TARTRATE 5 MG: 5 INJECTION, SOLUTION INTRAVENOUS at 01:04

## 2020-01-01 RX ADMIN — HEPARIN SODIUM 1300 UNITS: 1000 INJECTION, SOLUTION INTRAVENOUS; SUBCUTANEOUS at 12:04

## 2020-01-01 RX ADMIN — FENTANYL CITRATE 75 MCG: 50 INJECTION INTRAMUSCULAR; INTRAVENOUS at 09:04

## 2020-01-01 RX ADMIN — SODIUM CITRATE AND CITRIC ACID MONOHYDRATE 30 ML: 500; 334 SOLUTION ORAL at 01:03

## 2020-01-01 RX ADMIN — PROPOFOL 20 MCG/KG/MIN: 10 INJECTION, EMULSION INTRAVENOUS at 03:03

## 2020-01-01 RX ADMIN — HEPARIN SODIUM 2000 UNITS: 1000 INJECTION, SOLUTION INTRAVENOUS; SUBCUTANEOUS at 10:04

## 2020-01-01 RX ADMIN — ACETAMINOPHEN 650 MG: 325 TABLET ORAL at 01:03

## 2020-01-01 RX ADMIN — IPRATROPIUM BROMIDE AND ALBUTEROL SULFATE 3 ML: .5; 3 SOLUTION RESPIRATORY (INHALATION) at 11:04

## 2020-01-01 RX ADMIN — FENTANYL CITRATE 2500 MCG: 50 INJECTION INTRAMUSCULAR; INTRAVENOUS at 04:04

## 2020-01-01 RX ADMIN — MORPHINE SULFATE 2 MG: 2 INJECTION, SOLUTION INTRAMUSCULAR; INTRAVENOUS at 03:04

## 2020-01-01 RX ADMIN — DEXTROSE 250 ML: 10 SOLUTION INTRAVENOUS at 05:03

## 2020-01-01 RX ADMIN — SODIUM CHLORIDE, SODIUM LACTATE, POTASSIUM CHLORIDE, AND CALCIUM CHLORIDE 1000 ML: .6; .31; .03; .02 INJECTION, SOLUTION INTRAVENOUS at 01:04

## 2020-01-01 RX ADMIN — GLYCOPYRROLATE 0.2 MG: 0.2 INJECTION, SOLUTION INTRAMUSCULAR; INTRAVENOUS at 10:04

## 2020-01-01 RX ADMIN — PROPOFOL 15 MCG/KG/MIN: 10 INJECTION, EMULSION INTRAVENOUS at 06:03

## 2020-01-01 RX ADMIN — INSULIN DETEMIR 10 UNITS: 100 INJECTION, SOLUTION SUBCUTANEOUS at 12:03

## 2020-01-01 RX ADMIN — VANCOMYCIN HYDROCHLORIDE 1500 MG: 1.5 INJECTION, POWDER, LYOPHILIZED, FOR SOLUTION INTRAVENOUS at 11:04

## 2020-01-01 RX ADMIN — SODIUM CHLORIDE 500 ML: 0.9 INJECTION, SOLUTION INTRAVENOUS at 01:03

## 2020-01-01 RX ADMIN — DEXMEDETOMIDINE HYDROCHLORIDE 1.2 MCG/KG/HR: 4 INJECTION, SOLUTION INTRAVENOUS at 12:03

## 2020-01-01 RX ADMIN — CARVEDILOL 6.25 MG: 6.25 TABLET, FILM COATED ORAL at 12:04

## 2020-01-01 RX ADMIN — INSULIN ASPART 4 UNITS: 100 INJECTION, SOLUTION INTRAVENOUS; SUBCUTANEOUS at 08:04

## 2020-01-01 RX ADMIN — INSULIN ASPART 4 UNITS: 100 INJECTION, SOLUTION INTRAVENOUS; SUBCUTANEOUS at 06:03

## 2020-01-01 RX ADMIN — HYDROXYCHLOROQUINE SULFATE 400 MG: 200 TABLET, FILM COATED ORAL at 08:03

## 2020-01-01 RX ADMIN — FENOFIBRATE 145 MG: 145 TABLET ORAL at 08:04

## 2020-01-01 RX ADMIN — LORAZEPAM 1 MG: 2 INJECTION INTRAMUSCULAR; INTRAVENOUS at 05:04

## 2020-01-01 RX ADMIN — HEPARIN SODIUM 750 UNITS/HR: 10000 INJECTION, SOLUTION INTRAVENOUS at 06:03

## 2020-01-01 RX ADMIN — GUAIFENESIN 600 MG: 600 TABLET, EXTENDED RELEASE ORAL at 11:03

## 2020-01-01 RX ADMIN — DEXMEDETOMIDINE HYDROCHLORIDE 1 MCG/KG/HR: 4 INJECTION, SOLUTION INTRAVENOUS at 08:03

## 2020-01-01 RX ADMIN — ACETAMINOPHEN 650 MG: 325 TABLET ORAL at 12:03

## 2020-01-01 RX ADMIN — MORPHINE SULFATE 2 MG/HR: 10 INJECTION INTRAVENOUS at 02:04

## 2020-01-01 RX ADMIN — MORPHINE SULFATE: 2 INJECTION, SOLUTION INTRAMUSCULAR; INTRAVENOUS at 02:04

## 2020-01-01 RX ADMIN — SODIUM CHLORIDE 500 ML: 0.9 INJECTION, SOLUTION INTRAVENOUS at 09:03

## 2020-01-01 RX ADMIN — DEXTROSE MONOHYDRATE 25 G: 500 INJECTION PARENTERAL at 10:04

## 2020-01-01 RX ADMIN — INSULIN DETEMIR 10 UNITS: 100 INJECTION, SOLUTION SUBCUTANEOUS at 10:03

## 2020-01-01 RX ADMIN — Medication 100 MCG/HR: at 12:03

## 2020-01-01 RX ADMIN — PROPOFOL 25 MCG/KG/MIN: 10 INJECTION, EMULSION INTRAVENOUS at 07:03

## 2020-01-01 RX ADMIN — PROPOFOL 5 MCG/KG/MIN: 10 INJECTION, EMULSION INTRAVENOUS at 12:03

## 2020-01-01 RX ADMIN — DEXTROSE 250 ML: 10 SOLUTION INTRAVENOUS at 04:03

## 2020-01-01 RX ADMIN — HEPARIN SODIUM 5000 UNITS: 5000 INJECTION, SOLUTION INTRAVENOUS; SUBCUTANEOUS at 12:04

## 2020-01-01 RX ADMIN — TOPICAL ANESTHETIC: 200 SPRAY DENTAL; PERIODONTAL at 10:03

## 2020-01-01 RX ADMIN — LORAZEPAM 2 MG: 2 INJECTION INTRAMUSCULAR at 05:04

## 2020-01-01 RX ADMIN — HYDROXYCHLOROQUINE SULFATE 400 MG: 200 TABLET, FILM COATED ORAL at 04:03

## 2020-01-01 RX ADMIN — DEXTROSE MONOHYDRATE 25 G: 25 INJECTION, SOLUTION INTRAVENOUS at 09:03

## 2020-01-01 RX ADMIN — POLYETHYLENE GLYCOL 3350 17 G: 17 POWDER, FOR SOLUTION ORAL at 12:04

## 2020-01-01 RX ADMIN — HYDROMORPHONE HYDROCHLORIDE 2 MG: 2 INJECTION, SOLUTION INTRAMUSCULAR; INTRAVENOUS; SUBCUTANEOUS at 05:04

## 2020-01-01 RX ADMIN — FENTANYL CITRATE 2500 MCG: 50 INJECTION INTRAMUSCULAR; INTRAVENOUS at 09:04

## 2020-01-01 RX ADMIN — FENTANYL CITRATE 50 MCG: 50 INJECTION INTRAMUSCULAR; INTRAVENOUS at 06:03

## 2020-01-01 RX ADMIN — GUAIFENESIN 600 MG: 600 TABLET, EXTENDED RELEASE ORAL at 09:03

## 2020-01-01 RX ADMIN — Medication 100 MCG/HR: at 02:03

## 2020-01-01 RX ADMIN — HEPARIN SODIUM 5000 UNITS: 5000 INJECTION, SOLUTION INTRAVENOUS; SUBCUTANEOUS at 10:04

## 2020-01-01 RX ADMIN — PROPOFOL: 10 INJECTION, EMULSION INTRAVENOUS at 11:03

## 2020-01-01 RX ADMIN — ACETAMINOPHEN 650 MG: 325 TABLET ORAL at 10:03

## 2020-01-01 RX ADMIN — GLYCOPYRROLATE 0.2 MG: 0.2 INJECTION, SOLUTION INTRAMUSCULAR; INTRAVENOUS at 09:04

## 2020-01-01 RX ADMIN — SODIUM CHLORIDE 250 ML: 0.9 INJECTION, SOLUTION INTRAVENOUS at 04:03

## 2020-01-01 RX ADMIN — Medication 250 MCG/HR: at 10:03

## 2020-01-01 RX ADMIN — Medication 100 MCG/HR: at 03:03

## 2020-01-01 RX ADMIN — FUROSEMIDE 60 MG: 10 INJECTION, SOLUTION INTRAMUSCULAR; INTRAVENOUS at 12:03

## 2020-01-01 RX ADMIN — INSULIN DETEMIR 20 UNITS: 100 INJECTION, SOLUTION SUBCUTANEOUS at 08:03

## 2020-01-01 RX ADMIN — CHLORHEXIDINE GLUCONATE 0.12% ORAL RINSE 15 ML: 1.2 LIQUID ORAL at 12:04

## 2020-01-01 RX ADMIN — FUROSEMIDE 80 MG: 10 INJECTION, SOLUTION INTRAVENOUS at 01:04

## 2020-01-01 RX ADMIN — FENTANYL CITRATE 50 MCG: 50 INJECTION INTRAMUSCULAR; INTRAVENOUS at 11:04

## 2020-01-01 RX ADMIN — INSULIN DETEMIR 10 UNITS: 100 INJECTION, SOLUTION SUBCUTANEOUS at 08:04

## 2020-01-01 RX ADMIN — LORAZEPAM 1 MG: 2 INJECTION INTRAMUSCULAR; INTRAVENOUS at 11:04

## 2020-01-01 RX ADMIN — DEXMEDETOMIDINE HYDROCHLORIDE 1 MCG/KG/HR: 4 INJECTION, SOLUTION INTRAVENOUS at 03:03

## 2020-01-01 RX ADMIN — BENZONATATE 200 MG: 100 CAPSULE ORAL at 09:03

## 2020-01-01 RX ADMIN — INSULIN ASPART 2 UNITS: 100 INJECTION, SOLUTION INTRAVENOUS; SUBCUTANEOUS at 05:04

## 2020-01-01 RX ADMIN — INSULIN ASPART 2 UNITS: 100 INJECTION, SOLUTION INTRAVENOUS; SUBCUTANEOUS at 05:03

## 2020-01-01 RX ADMIN — CARVEDILOL 6.25 MG: 6.25 TABLET, FILM COATED ORAL at 08:04

## 2020-01-01 RX ADMIN — FUROSEMIDE 10 MG/HR: 10 INJECTION, SOLUTION INTRAVENOUS at 10:03

## 2020-01-01 RX ADMIN — ATORVASTATIN CALCIUM 40 MG: 40 TABLET, FILM COATED ORAL at 11:03

## 2020-01-01 RX ADMIN — DEXMEDETOMIDINE HYDROCHLORIDE 1.4 MCG/KG/HR: 4 INJECTION, SOLUTION INTRAVENOUS at 07:04

## 2020-01-01 RX ADMIN — PROPOFOL 30 MCG/KG/MIN: 10 INJECTION, EMULSION INTRAVENOUS at 08:03

## 2020-01-01 RX ADMIN — FENTANYL CITRATE 50 MCG: 50 INJECTION INTRAMUSCULAR; INTRAVENOUS at 05:04

## 2020-01-01 RX ADMIN — BENZONATATE 200 MG: 100 CAPSULE ORAL at 04:03

## 2020-01-01 RX ADMIN — FAMOTIDINE 20 MG: 10 INJECTION, SOLUTION INTRAVENOUS at 10:03

## 2020-01-09 PROBLEM — I10 ESSENTIAL HYPERTENSION: Status: ACTIVE | Noted: 2020-01-01

## 2020-01-09 PROBLEM — E78.2 HYPERLIPIDEMIA, MIXED: Status: ACTIVE | Noted: 2020-01-01

## 2020-01-09 PROBLEM — E11.51 TYPE 2 DIABETES MELLITUS WITH DIABETIC PERIPHERAL ANGIOPATHY WITHOUT GANGRENE, WITHOUT LONG-TERM CURRENT USE OF INSULIN: Status: ACTIVE | Noted: 2020-01-01

## 2020-01-09 PROBLEM — I73.9 PAD (PERIPHERAL ARTERY DISEASE): Status: ACTIVE | Noted: 2020-01-01

## 2020-01-09 PROBLEM — I25.10 ASCVD (ARTERIOSCLEROTIC CARDIOVASCULAR DISEASE): Status: ACTIVE | Noted: 2020-01-01

## 2020-01-09 NOTE — PROGRESS NOTES
[facility] Note    Chief Complaint      Chief Complaint   Patient presents with    Establish Care    Cough       History of Present Illness      Zhao Fischer is a 74 y.o. male with chronic conditions of DM2, HTN, HLD, PVD who presents today for: re-establish care from  and review chronic conditions.  Reports he has caught his wife's cold, now coughing and occasionally coughing up phlegm.  Has started guaifenesin, dextromethorphan.    DM2: Last A1C 6/2019 6.8. FBG has been elevated in the past few weeks but has gotten controlled in the past few days.  On jardiance, glimepiride.  Last GFR 62.  Eye exam UTD with Dr. Brown.  HTN: BP at goal on lisinopril, metoprolol, amlodipine.  HLD: Controlled on simvastatin, fenofibrate.  LDL due.  PVD: Sees Dr. Sosa.  S/p angioplasty left lower extremity.  On plavix, simvastatin for secondary preventation.  Flu shot UTD.  Prevnar, pneumovax UTD.  Shingles vaccine discussed.  Cscope UTD 2015, Dr. Kevin, no polyps, 5 yr interval for previous polyps.    Past Medical History:  History reviewed. No pertinent past medical history.    Past Surgical History:   has a past surgical history that includes Angioplasty and Cardiac catheterization.    Family History:  family history includes Hypertension in his father; No Known Problems in his mother; Stroke in his father.     Social History:  Social History     Tobacco Use    Smoking status: Former Smoker   Substance Use Topics    Alcohol use: Yes     Comment: social    Drug use: Not on file       Review of Systems   Constitutional: Negative for chills, fever and malaise/fatigue.   Respiratory: Negative for shortness of breath.    Cardiovascular: Negative for chest pain.   Gastrointestinal: Negative for constipation, diarrhea, nausea and vomiting.   Skin: Negative for rash.   Neurological: Negative for weakness.        Medications:  Outpatient Encounter Medications as of 1/9/2020   Medication Sig Dispense Refill     "amLODIPine (NORVASC) 2.5 MG tablet TK 1 T PO QD  1    atorvastatin (LIPITOR) 40 MG tablet TK 1 T PO HS  2    clopidogrel (PLAVIX) 75 mg tablet TK 1 T PO D  1    empagliflozin (JARDIANCE) 10 mg Tab Take 10 mg by mouth once daily. 90 tablet 0    fenofibrate (TRICOR) 145 MG tablet TK 1 T PO D 90 tablet 3    glimepiride (AMARYL) 2 MG tablet TK 1 T PO QD WITH FIRST MEAL 90 tablet 3    lisinopril (PRINIVIL,ZESTRIL) 40 MG tablet TAKE 1 TABLET BY MOUTH ONCE DAILY 90 tablet 3    metoprolol succinate (TOPROL-XL) 50 MG 24 hr tablet TK 1 T PO D 90 tablet 3    ONETOUCH ULTRASOFT LANCETS lancets TEST ONCE D  4    ONETOUCH VERIO Strp Use to test BS twice daily 200 strip 1    fluconazole (DIFLUCAN) 150 MG Tab Take 1 tablet (150 mg total) by mouth once daily. Take 1 tablet on day 1.  Take another tablet on day 3. (Patient not taking: Reported on 1/9/2020) 2 tablet 3     No facility-administered encounter medications on file as of 1/9/2020.        Allergies:  Review of patient's allergies indicates:   Allergen Reactions    Clindamycin Diarrhea       Health Maintenance:  Immunization History   Administered Date(s) Administered    Influenza - Quadrivalent 11/07/2019      Health Maintenance   Topic Date Due    Hepatitis C Screening  1945    Lipid Panel  1945    Hemoglobin A1c  1945    Foot Exam  10/03/1955    Eye Exam  10/03/1955    TETANUS VACCINE  10/03/1963    Colonoscopy  10/03/1995    Pneumococcal Vaccine (65+ Low/Medium Risk) (1 of 2 - PCV13) 10/03/2010    Abdominal Aortic Aneurysm Screening  10/03/2010    High Dose Statin  01/09/2021    Aspirin/Antiplatelet Therapy  01/09/2021        Physical Exam      Vital Signs  Temp: 97.9 °F (36.6 °C)  Temp src: Oral  Pulse: 80  SpO2: 96 %  BP: 120/68  BP Location: Left arm  Patient Position: Sitting  Pain Score: 0-No pain  Height and Weight  Height: 5' 7.5" (171.5 cm)  Weight: 80.5 kg (177 lb 7.5 oz)  BSA (Calculated - sq m): 1.96 sq meters  BMI " (Calculated): 27.4  Weight in (lb) to have BMI = 25: 161.7]    Physical Exam   Constitutional: He appears well-developed and well-nourished.   HENT:   Head: Normocephalic and atraumatic.   Right Ear: External ear normal.   Left Ear: External ear normal.   Mouth/Throat: Oropharynx is clear and moist.   Eyes: Pupils are equal, round, and reactive to light. Conjunctivae and EOM are normal.   Cardiovascular: Normal rate, regular rhythm, normal heart sounds and intact distal pulses.   No murmur heard.  Pulmonary/Chest: Effort normal and breath sounds normal. He has no wheezes. He has no rales.   Abdominal: Soft. Bowel sounds are normal. He exhibits no distension and no abdominal bruit. There is no hepatosplenomegaly. There is no tenderness.   Vitals reviewed.       Laboratory:  CBC:      CMP:        Invalid input(s): CREATININ  URINALYSIS:       LIPIDS:      TSH:      A1C:        Assessment/Plan     Zhao Fischer is a 74 y.o.male with:    1. Type 2 diabetes mellitus with diabetic peripheral angiopathy without gangrene, without long-term current use of insulin  - Comprehensive metabolic panel; Future  - Hemoglobin A1c; Future  - CBC auto differential; Future  - Microalbumin/creatinine urine ratio; Future  Continue current meds.    2. Essential hypertension  - Comprehensive metabolic panel; Future  - CBC auto differential; Future  Continue current meds.    3. Hyperlipidemia, mixed  - Comprehensive metabolic panel; Future  - CBC auto differential; Future  Continue current meds.    4. PAD (peripheral artery disease)  - Comprehensive metabolic panel; Future  - CBC auto differential; Future  Continue current meds.  F/U with Dr. Sosa.  5. ASCVD (arteriosclerotic cardiovascular disease)  - Comprehensive metabolic panel; Future  - CBC auto differential; Future    6. Screening for malignant neoplasm of prostate  - PSA, Screening; Future       Chronic conditions status updated as per HPI.  Other than changes above, cont current  medications and maintain follow up with specialists.  Return to clinic in 6 months.    Mannie Russell MD  [facility]

## 2020-01-17 NOTE — TELEPHONE ENCOUNTER
----- Message from Dante Ramírez sent at 1/17/2020 10:22 AM CST -----  Contact: shayne fletcher [facility] [phone]  Shayne would like to speak with your office regarding the medication empagliflozin (JARDIANCE) 10 mg Tab, states she needs a authorization and has sent multiple request. Please Advise.

## 2020-01-30 NOTE — PROGRESS NOTES
Labs look good.  Diabetes control is close to goal.  Cont to focus on low carb, low sugar diet but no medication changes needed at this time.

## 2020-03-09 NOTE — TELEPHONE ENCOUNTER
----- Message from Chasity Mendoza sent at 3/9/2020  1:01 PM CDT -----  Contact: [phone] Heidi  Patient is completely out of Med and [facility] has said that they sent to Dr Russell and RX has been denied.  Please notify patient's wife once RX is called in.    Requesting an RX refill or new RX.  Is this a refill or new RX:  Refill  RX name and strength: amLODIPine  2.5 MG tablet (BESYLATE) is brand name on bottle.  On file is (NORVASC)  Directions 1 TAB Nightly  Is this a 30 day or 90 day RX:  90 day  Local pharmacy or mail order pharmacy:  local  Pharmacy name and phone # (copy/paste from chart):   [id number]  [place] [id number] AT [facility] [address]    [phone] (Phone)  [phone] (Fax)    Comments:

## 2020-03-16 NOTE — TELEPHONE ENCOUNTER
Spoke with wife. Pt had a fever yesterday of 100.2 and today its 99.7. Cough has gone down some. Wife stated in order to be tested for corona they have to be tested for flu first.

## 2020-03-16 NOTE — TELEPHONE ENCOUNTER
As for coronavirus testing, he does not meet the restricted criteria for testing because of the limited supplies.  When more supplies become available and the testing indication expands, we can test him.  As for flu, please schedule for flu swab in clinic or at an urgent care.

## 2020-03-16 NOTE — TELEPHONE ENCOUNTER
Called and discussed concerns.  Pt has been running low grade fevers, cough, body aches for 3 days.  Discussed being out of the window for tamiflu and the decreased need for flu testing.  Discussed him not having the appropriate criteria for COVID testing as it stands currently.  Offered empiric antibiotics to cover secondary bacterial infection which pt agreed to.  Will keep appt for now but will determine tomorrow whether he wants to cancel.

## 2020-03-16 NOTE — TELEPHONE ENCOUNTER
----- Message from Chasity Mendoza sent at 3/16/2020 12:35 PM CDT -----  Contact: [phone] Heidi  Patient has had fever, cough, fatigue since yesterday.  Wife has symptoms too.  Patient is wanting to be tested for coronavirus.  Patient is needing to be assessed and a test ordered.  Please contact patient's wife to advise.

## 2020-03-21 PROBLEM — J96.01 ACUTE RESPIRATORY FAILURE WITH HYPOXIA: Status: ACTIVE | Noted: 2020-01-01

## 2020-03-21 NOTE — ED TRIAGE NOTES
Pt presents to ED with complaints of fever, cough and SOB since this past Sunday. Pt states he did not feel SOB but states his wife states she noticed a change in his breathing. Pt states that he only noticed his SOB when coughing. Pt states that he has been running fever around 101F as well since Sunday and has been taking tylenol for the fever. Pt states that the fever breaks but then comes back. Pt denies all other medical complaints at this time. Pt is AAOx4, Skin is warm and dry; pt in no acute distress. Pt denies pain. Pt is wearing mask.      APPEARANCE: Alert, oriented and in no acute distress.  CARDIAC: Normal rate and rhythm   PERIPHERAL VASCULAR: peripheral pulses present. Normal cap refill. No edema. Warm to touch.    RESPIRATORY:Normal rate and effort, breath sounds clear bilaterally throughout chest. Respirations are equal and unlabored no obvious signs of distress.  GASTRO: soft, bowel sounds normal, no tenderness, no abdominal distention.  MUSC: Full ROM. No bony tenderness or soft tissue tenderness. No obvious deformity.  SKIN: Skin is warm and dry, normal skin turgor, mucous membranes moist.  NEURO: 5/5 strength major flexors/extensors bilaterally. Sensory intact to light touch bilaterally. Peck coma scale: eyes open spontaneously-4, oriented & converses-5, obeys commands-6. No neurological abnormalities.   MENTAL STATUS: awake, alert and aware of environment.

## 2020-03-21 NOTE — ED PROVIDER NOTES
Encounter Date: 3/21/2020    SCRIBE #1 NOTE: I, Daksha Chapman, am scribing for, and in the presence of,  Dr. Bourgeois . I have scribed the entire note.       History     Chief Complaint   Patient presents with    Cough     pt c/o dry cough, denies any sick contacts     Shortness of Breath     reports sob with cough, denies sob with ambulation    Fever     pt reports fever that started last sunday relieved with tylenol at home     Patient is a 74-year-old male with no past medical history on file who presents to the ED with the complaint of non productive cough, shortness of breath and fever since last Sunday. Pt states that he was prescribed PO antibiotics by his PCP for the aforementioned complaint, but that he continues to have symptoms that have slowly worsened. He denies any recent sick contacts, hx of immunosuppression. Pt denies any CP/nausea/vomiting/diarrhea/chills/night sweats/abdominal pain.     The history is provided by the patient.     Review of patient's allergies indicates:   Allergen Reactions    Clindamycin Diarrhea     Past Medical History:   Diagnosis Date    Diabetes mellitus, type 2     Hyperlipidemia     Hypertension     Peripheral artery disease      Past Surgical History:   Procedure Laterality Date    ANGIOPLASTY      CARDIAC CATHETERIZATION       Family History   Problem Relation Age of Onset    No Known Problems Mother     Stroke Father     Hypertension Father      Social History     Tobacco Use    Smoking status: Former Smoker   Substance Use Topics    Alcohol use: Yes     Comment: social    Drug use: Not on file     Review of Systems   Constitutional: Positive for fever (relieved ). Negative for chills.   HENT: Negative for sore throat.    Respiratory: Positive for cough and shortness of breath.    Cardiovascular: Negative for chest pain.   Gastrointestinal: Negative for constipation, diarrhea, nausea and vomiting.   Genitourinary: Negative for dysuria, frequency and  urgency.   Musculoskeletal: Negative for back pain.   Skin: Negative for rash and wound.   Neurological: Negative for weakness.   Hematological: Does not bruise/bleed easily.   Psychiatric/Behavioral: Negative for agitation, behavioral problems and confusion.       Physical Exam     Initial Vitals [03/21/20 1630]   BP Pulse Resp Temp SpO2   125/73 102 20 99.8 °F (37.7 °C) (!) 88 %      MAP       --         Physical Exam    Nursing note and vitals reviewed.  Constitutional: He appears well-developed and well-nourished. He is not diaphoretic. No distress.   Non toxic in appearance  No distress noted    HENT:   Head: Normocephalic and atraumatic.   Mouth/Throat: Oropharynx is clear and moist.   Eyes: EOM are normal. Pupils are equal, round, and reactive to light.   Neck: No tracheal deviation present.   Cardiovascular: Normal rate, regular rhythm, normal heart sounds and intact distal pulses.   Pulmonary/Chest: Breath sounds normal. No stridor. No respiratory distress.   Abdominal: Soft. He exhibits no distension and no mass. There is no tenderness.   Musculoskeletal: Normal range of motion. He exhibits no edema.   Neurological: He is alert and oriented to person, place, and time. No cranial nerve deficit or sensory deficit.   Skin: Skin is warm and dry. Capillary refill takes less than 2 seconds. No rash noted.   Psychiatric: He has a normal mood and affect. His behavior is normal. Thought content normal.         ED Course   Procedures  Labs Reviewed   CBC W/ AUTO DIFFERENTIAL - Abnormal; Notable for the following components:       Result Value    WBC 2.78 (*)     Lymph # 0.3 (*)     Mono # 0.2 (*)     Gran% 82.3 (*)     Lymph% 11.9 (*)     All other components within normal limits   COMPREHENSIVE METABOLIC PANEL - Abnormal; Notable for the following components:    Sodium 132 (*)     CO2 21 (*)     Glucose 118 (*)     Calcium 8.5 (*)     Albumin 3.1 (*)     AST 66 (*)     All other components within normal limits    URINALYSIS, REFLEX TO URINE CULTURE - Abnormal; Notable for the following components:    Protein, UA Trace (*)     Glucose, UA 3+ (*)     Occult Blood UA Trace (*)     All other components within normal limits    Narrative:     Preferred Collection Type->Urine, Clean Catch   TROPONIN I - Abnormal; Notable for the following components:    Troponin I 0.046 (*)     All other components within normal limits   INFLUENZA A & B BY MOLECULAR   LACTIC ACID, PLASMA   PROCALCITONIN   URINALYSIS MICROSCOPIC    Narrative:     Preferred Collection Type->Urine, Clean Catch   B-TYPE NATRIURETIC PEPTIDE   C-REACTIVE PROTEIN   TROPONIN I   SEDIMENTATION RATE   B-TYPE NATRIURETIC PEPTIDE        ECG Results    None       Imaging Results          X-Ray Chest AP Portable (Final result)  Result time 03/21/20 17:58:40    Final result by Anil Bright MD (03/21/20 17:58:40)                 Impression:      Ill-defined interstitial opacities.  No focal consolidation.      Electronically signed by: Anil Bright MD  Date:    03/21/2020  Time:    17:58             Narrative:    EXAMINATION:  XR CHEST AP PORTABLE    CLINICAL HISTORY:  Cough    TECHNIQUE:  Single frontal view of the chest was performed.    COMPARISON:  None    FINDINGS:  Monitoring EKG leads are present.  The trachea is unremarkable.  There are calcifications of the aortic knob.  The cardiomediastinal silhouette is within normal limits.  The hemidiaphragms are unremarkable.  There is no evidence of free air beneath the hemidiaphragms.  There are no pleural effusions.  There is no evidence of a pneumothorax.  There is no evidence of pneumomediastinum.  There are ill-defined interstitial opacities.  The osseous structures are unremarkable.                                 Medical Decision Making:   Independently Interpreted Test(s):   I have ordered and independently interpreted X-rays - see prior notes.  Clinical Tests:   Lab Tests: Ordered and Reviewed  Radiological Study:  Ordered and Reviewed  ED Management:  - CXR demonstrates ill-defined opacities per final radiology read  - in light of pt's age, presenting symptoms, will admit for possible COVID exposure  - pt in agreement with plan for admission                                    Clinical Impression:       ICD-10-CM ICD-9-CM   1. Cough R05 786.2   2. Acute respiratory failure with hypoxia J96.01 518.81   3. Chest pain R07.9 786.50   4. Prolonged Q-T interval on ECG R94.31 794.31   5. Community acquired pneumonia of both lungs J18.9 486   6. Pneumonia due to Severe Acute Respiratory Syndrome Coronavirus 2 (SARS-Cov-2) J12.89     B97.29              ED Disposition Condition    Observation         I, Baldo Bourgeois,  personally performed the services described in this documentation. All medical record entries made by the scribe were at my direction and in my presence.  I have reviewed the chart and agree that the record reflects my personal performance and is accurate and complete. Baldo Bourgeois M.D. 7:36 AM03/25/2020                    Baldo Bourgeois MD  03/25/20 0736

## 2020-03-22 PROBLEM — I73.9 PAD (PERIPHERAL ARTERY DISEASE): Chronic | Status: ACTIVE | Noted: 2020-01-01

## 2020-03-22 PROBLEM — E11.51 TYPE 2 DIABETES MELLITUS WITH DIABETIC PERIPHERAL ANGIOPATHY WITHOUT GANGRENE, WITHOUT LONG-TERM CURRENT USE OF INSULIN: Chronic | Status: ACTIVE | Noted: 2020-01-01

## 2020-03-22 PROBLEM — E78.2 HYPERLIPIDEMIA, MIXED: Chronic | Status: ACTIVE | Noted: 2020-01-01

## 2020-03-22 PROBLEM — J18.9 COMMUNITY ACQUIRED PNEUMONIA OF BOTH LUNGS: Status: ACTIVE | Noted: 2020-01-01

## 2020-03-22 PROBLEM — I10 ESSENTIAL HYPERTENSION: Chronic | Status: ACTIVE | Noted: 2020-01-01

## 2020-03-22 PROBLEM — Z20.822 SUSPECTED COVID-19 VIRUS INFECTION: Status: ACTIVE | Noted: 2020-01-01

## 2020-03-22 PROBLEM — I25.10 ASCVD (ARTERIOSCLEROTIC CARDIOVASCULAR DISEASE): Chronic | Status: ACTIVE | Noted: 2020-01-01

## 2020-03-22 NOTE — PLAN OF CARE
VN cued into room for q2h rounding.  Pt resting comfortably in bed.  Pt requesting something to help him sleep tonight.  Sent secure chat to doctor. Call light within reach, fall precautions maintained.  VN will continue to follow and be available as needed.

## 2020-03-22 NOTE — HPI
Zhao Fischer is a 74 year old white man with hypertension, hyperlipidemia, diabetes mellitus type 2, peripheral artery disease, arteriosclerotic cardiovascular disease. He lives in [place], [place]. He has been  since he was 16 years old. 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 His primary care physician is Dr. Mannie Russell.    He contacted Dr. Russell on 3/16/2020 to request testing for COVID-19 due to nonproductive cough, shortness of breath, and fever for the past day, but he could not get tested due to limited outpatient testing. He was prescribed levofloxacin 500 mg daily.    He presented to [facility] on 3/21/2020 with persistent symptoms, including diarrhea, which is common with COVID-19 infection, and poor appetite. He reported that his wife had similar symptoms. He was hypoxic, with oxygen saturation of 88%. Chest X-ray showed interstitial lung opacities. WBC count was low at 2780. BNP, lactate, and procalcitonin were low. CRP was elevated at 154 mg/L. COVID-19 testing has been liberal in the emergency department (criteria of cough alone is enough) so he was tested. He was not given anything other than supplemental oxygen in the emergency department. He was admitted to [facility].

## 2020-03-22 NOTE — PLAN OF CARE
VN cued into room for q2h rounding.  Pt resting comfortably in bed.  Pt stated hes been having diarrhea, notified bedside nurse.  Call light within reach, fall precautions maintained.  VN will continue to follow and be available as needed.

## 2020-03-22 NOTE — PLAN OF CARE
Patient resting in bed, AAOx4. Medications administered as ordered. No complaints of pain overnight. Asleep on and off through shift. Encouraged to call with needs or concerns. Will continue to monitor.

## 2020-03-22 NOTE — ASSESSMENT & PLAN NOTE
Suspected Covid-19 Virus Infection  Appreciate Infectious Disease. Give hydroxychloroquine and azithromycin.

## 2020-03-22 NOTE — SUBJECTIVE & OBJECTIVE
Interval History: Infectious Disease recommended hydroxychloroquine and azithromycin to treat possible COVID-19. I talked to his wife as well.    Review of Systems   Constitutional: Positive for appetite change.   Respiratory: Positive for cough and shortness of breath.      Objective:     Vital Signs (Most Recent):  Temp: 98.6 °F (37 °C) (03/22/20 1224)  Pulse: 99 (03/22/20 1224)  Resp: 18 (03/22/20 1224)  BP: 112/68 (03/22/20 1224)  SpO2: (!) 92 % (03/22/20 0941) Vital Signs (24h Range):  Temp:  [98.2 °F (36.8 °C)-100 °F (37.8 °C)] 98.6 °F (37 °C)  Pulse:  [] 99  Resp:  [18-22] 18  SpO2:  [88 %-97 %] 92 %  BP: (108-147)/(68-82) 112/68     Weight: 79.5 kg (175 lb 4.3 oz)  Body mass index is 26.65 kg/m².    Intake/Output Summary (Last 24 hours) at 3/22/2020 1553  Last data filed at 3/22/2020 1400  Gross per 24 hour   Intake --   Output 101 ml   Net -101 ml      Physical Exam   Constitutional: He is oriented to person, place, and time. He appears well-developed. No distress. Nasal cannula in place.   Pulmonary/Chest: Effort normal. No respiratory distress.   Neurological: He is alert and oriented to person, place, and time.   Psychiatric: He has a normal mood and affect.   Nursing note and vitals reviewed.      Significant Labs: All pertinent labs within the past 24 hours have been reviewed.    Significant Imaging: I have reviewed all pertinent imaging results/findings within the past 24 hours.   X-Ray Chest AP Portable 3/21/20:   FINDINGS: Monitoring EKG leads are present.  The trachea is unremarkable.  There are calcifications of the aortic knob.  The cardiomediastinal silhouette is within normal limits.  The hemidiaphragms are unremarkable.  There is no evidence of free air beneath the hemidiaphragms.  There are no pleural effusions.  There is no evidence of a pneumothorax.  There is no evidence of pneumomediastinum.  There are ill-defined interstitial opacities.  The osseous structures are  unremarkable.  Impression: Ill-defined interstitial opacities.  No focal consolidation.

## 2020-03-22 NOTE — PLAN OF CARE
VN cued into room to complete admit assessment, VIP model introduced, VN working alongside bedside treatment team.  Admit questions and Med Rec completed. Plan of care reviewed with patient. Patient verbalized understanding. Patient informed of fall risk and fall precautions, call light within reach, 2x bed rails. Patient notified to ask staff for assistance and pt verbalized complete understanding. Time allowed for questions. Will continue to monitor and intervene as needed.

## 2020-03-22 NOTE — H&P
[facility]  History & Physical    Patient Name: Zhao Fischer  MRN: [id number]  Admission Date: 3/21/2020  Attending Physician: Ezequiel Huang MD   Primary Care Provider: Mannie Russell MD         Patient information was obtained from patient, past medical records and ER records.     Subjective:     Principal Problem:Acute respiratory failure with hypoxia    Chief Complaint:   Chief Complaint   Patient presents with    Cough     pt c/o dry cough, denies any sick contacts     Shortness of Breath     reports sob with cough, denies sob with ambulation    Fever     pt reports fever that started last sunday relieved with tylenol at home        HPI: Zhao Fischer is a 73 yo male with hypertension, hyperlipidemia, Type 2 Diabetes Mellitus and peripheral artery disease. His primary care physician is Dr. Mannie Russell. He lives in [place], [place]     Patient presented to [facility] ED 3/21/20 with nonproductive cough, low grade fever/chills and SOB x 1 week. Patient denies recent travel. His wife has been ill with similar symptoms. Patient called his PCP due to concern for COVID 19. He did not meet testing criteria at the time. He was given prescription for po antibiotics. Patient reports no improvement despite antibiotics and OTC decongestants.     Labs remarkable for wbc 2.7, crP 154, sed rate 2.7, troponin 0.046. Procalcitonin and lactic acid within normal limits. CXR shows Ill-defined interstitial opacities.  No focal consolidation. BP stable, Tmax 100, O2 sats 88% room air on arrival improved with supplemental O2. COVID 19 pending. Patient admitted to [facility].     History reviewed. No pertinent past medical history.    Past Surgical History:   Procedure Laterality Date    ANGIOPLASTY      CARDIAC CATHETERIZATION         Review of patient's allergies indicates:   Allergen Reactions    Clindamycin Diarrhea       No current facility-administered medications on  file prior to encounter.      Current Outpatient Medications on File Prior to Encounter   Medication Sig    amLODIPine (NORVASC) 2.5 MG tablet Take 1 tablet (2.5 mg total) by mouth once daily.    atorvastatin (LIPITOR) 40 MG tablet TK 1 T PO HS    clopidogrel (PLAVIX) 75 mg tablet TK 1 T PO D    empagliflozin (JARDIANCE) 10 mg Tab Take 10 mg by mouth once daily.    glimepiride (AMARYL) 2 MG tablet TK 1 T PO QD WITH FIRST MEAL    levoFLOXacin (LEVAQUIN) 500 MG tablet Take 1 tablet (500 mg total) by mouth once daily.    lisinopril (PRINIVIL,ZESTRIL) 40 MG tablet TAKE 1 TABLET BY MOUTH ONCE DAILY    metoprolol succinate (TOPROL-XL) 50 MG 24 hr tablet TK 1 T PO D    ONETOUCH ULTRASOFT LANCETS lancets TEST ONCE D    ONETOUCH VERIO Strp Use to test BS twice daily    fenofibrate (TRICOR) 145 MG tablet TK 1 T PO D     Family History     Problem Relation (Age of Onset)    Hypertension Father    No Known Problems Mother    Stroke Father        Tobacco Use    Smoking status: Former Smoker   Substance and Sexual Activity    Alcohol use: Yes     Comment: social    Drug use: Not on file    Sexual activity: Not on file     Review of Systems   Constitutional: Positive for chills, fatigue and fever.   Eyes: Negative for photophobia.   Respiratory: Positive for cough and shortness of breath. Negative for chest tightness and wheezing.    Cardiovascular: Negative for chest pain, palpitations and leg swelling.   Gastrointestinal: Negative for abdominal pain, diarrhea, nausea and vomiting.   Genitourinary: Negative for dysuria, flank pain and hematuria.   Musculoskeletal: Negative for back pain, myalgias and neck pain.   Skin: Negative for rash and wound.   Neurological: Positive for weakness. Negative for dizziness, syncope and headaches.   Psychiatric/Behavioral: Negative for agitation.     Objective:     Vital Signs (Most Recent):  Temp: 99.1 °F (37.3 °C) (03/22/20 0545)  Pulse: (!) 117 (03/22/20 0545)  Resp: 18  (03/22/20 0545)  BP: 139/80 (03/22/20 0545)  SpO2: 96 % (03/21/20 2205) Vital Signs (24h Range):  Temp:  [98.2 °F (36.8 °C)-100 °F (37.8 °C)] 99.1 °F (37.3 °C)  Pulse:  [] 117  Resp:  [18-20] 18  SpO2:  [88 %-97 %] 96 %  BP: (108-147)/(73-80) 139/80     Weight: 79.5 kg (175 lb 4.3 oz)  Body mass index is 26.65 kg/m².    Physical Exam   Constitutional: He is oriented to person, place, and time. He appears well-developed and well-nourished. No distress.   HENT:   Head: Normocephalic and atraumatic.   Eyes: Pupils are equal, round, and reactive to light. Conjunctivae are normal.   Neck: Normal range of motion. Neck supple. No JVD present.   Cardiovascular: Normal rate, regular rhythm, normal heart sounds and intact distal pulses.   Pulmonary/Chest: Effort normal and breath sounds normal. No respiratory distress. He has no wheezes.   Abdominal: Soft. Bowel sounds are normal. He exhibits no distension. There is no tenderness. There is no guarding.   Musculoskeletal: Normal range of motion. He exhibits no edema or tenderness.   Neurological: He is alert and oriented to person, place, and time.   Skin: Skin is warm and dry. Capillary refill takes less than 2 seconds. No erythema.   Psychiatric: He has a normal mood and affect. His behavior is normal.         CRANIAL NERVES     CN III, IV, VI   Pupils are equal, round, and reactive to light.       Significant Labs:   BMP:   Recent Labs   Lab 03/21/20 1955   *   *   K 4.5   CL 98   CO2 21*   BUN 15   CREATININE 1.1   CALCIUM 8.5*     CBC:   Recent Labs   Lab 03/21/20 1955   WBC 2.78*   HGB 16.9   HCT 49.7        Urine Studies:   Recent Labs   Lab 03/21/20 2012   COLORU Yellow   APPEARANCEUA Clear   PHUR 6.0   SPECGRAV 1.010   PROTEINUA Trace*   GLUCUA 3+*   KETONESU Negative   BILIRUBINUA Negative   OCCULTUA Trace*   NITRITE Negative   UROBILINOGEN Negative   LEUKOCYTESUR Negative   RBCUA 0   WBCUA 1   BACTERIA Rare       Significant Imaging: I  have reviewed all pertinent imaging results/findings within the past 24 hours.    Assessment/Plan:     * Acute respiratory failure with hypoxia  Suspected Covid-19 Virus Infection    75 yo male with 1 week hx of nonproductive cough, SOB, fever and fatigue. Influenza negative. Tmax 100. Initial O2 sats 88% room air. O2 sats 93-97% 2 liters nasal cannula. CXR shows ill defined interstitial opacities. Patient prescribed Levaquin 500 mg per pcp at onset of symptoms for possible secondary bacterial infection. Patient reports no improvement despite Levaquin and po decongestants.    COVD 19 pending   Maintain droplet, contact and airborne isolation   Continue supplemental O2 to keep sats > 92%, wean as tolerated  Mucinex twice daily, benzonatate and acetaminophen as needed               Essential hypertension  ASCVD (arteriosclerotic cardiovascular disease)  Hyperlipidemia   PAD (peripheral artery disease)    Continue amlodipine 2.5 mg daily, atorvastatin, plavix, lisinopril 40 mg daily  and metoprolol 50 mg daily               Type 2 diabetes mellitus with diabetic peripheral angiopathy without gangrene, without long-term current use of insulin  SSI, accucheck  AC&HS, Diabetic Diet        VTE Risk Mitigation (From admission, onward)         Ordered     IP VTE HIGH RISK PATIENT  Once      03/21/20 2227     Place sequential compression device  Until discontinued      03/21/20 2227                   Hortensia Davidson NP  Department of Hospital Medicine   [facility]

## 2020-03-22 NOTE — ASSESSMENT & PLAN NOTE
Suspected Covid-19 Virus Infection    73 yo male with 1 week hx of nonproductive cough, SOB, fever and fatigue. Influenza negative. Tmax 100. Initial O2 sats 88% room air. O2 sats 93-97% 2 liters nasal cannula. CXR shows ill defined interstitial opacities. Patient prescribed Levaquin 500 mg per pcp at onset of symptoms for possible secondary bacterial infection. Patient reports no improvement despite Levaquin and po decongestants.    COVD 19 pending   Maintain droplet, contact and airborne isolation   Continue supplemental O2 to keep sats > 92%, wean as tolerated  Mucinex twice daily, benzonatate and acetaminophen as needed

## 2020-03-22 NOTE — ED NOTES
Urinal placed at bedside and pt informed that a urine specimen is needed. Pt verbalized understanding and will call on call light when urine specimen is available.

## 2020-03-22 NOTE — PLAN OF CARE
VN cued into room for q2h rounding.  Pt resting comfortably in bed. Requesting tessalon perles and breathing treatment, notified bedside nurse via secure chat, paged respiratory.  Call light within reach, fall precautions maintained.  VN will continue to follow and be available as needed.

## 2020-03-22 NOTE — CONSULTS
[facility] Infectious Disease Consult     Primary Team: Ezequiel Huang MD  Consultant Attending: Dr. Melly FERNANDEZ  Date of Admit: 3/21/2020    Reason for Consult     COVID 19 rule out & hydroxychlorquine recommendations     History of Present Illness     Patient presented to [facility] ED 3/21/20 with nonproductive cough, low grade fever/chills and SOB x 1 week. Patient denies recent travel. His wife has been ill with similar symptoms. Patient called his PCP due to concern for COVID 19. He did not meet testing criteria at the time. He was given prescription for po antibiotics. Patient reports no improvement despite antibiotics and OTC decongestants.          Review of Systems (positives in bold):  ROS  Defer to primary team ROS due to COVID19 testing    Allergies:  Review of patient's allergies indicates:   Allergen Reactions    Clindamycin Diarrhea       Medications:   In-Hospital Scheduled Medications:   amLODIPine  2.5 mg Oral Daily    atorvastatin  40 mg Oral QHS    azithromycin  500 mg Oral Daily    clopidogreL  75 mg Oral Daily    fenofibrate  145 mg Oral Daily    guaiFENesin  600 mg Oral BID    lisinopriL  40 mg Oral Daily    metoprolol succinate  50 mg Oral Daily      In-Hospital PRN Medications:  acetaminophen, albuterol, benzonatate, Dextrose 10% Bolus, Dextrose 10% Bolus, glucagon (human recombinant), glucose, glucose, insulin aspart U-100, ondansetron, sodium chloride 0.9%      Past Medical History:  Past Medical History:   Diagnosis Date    Diabetes mellitus, type 2     Hyperlipidemia     Hypertension     Peripheral artery disease      Past Surgical History/ObGyn Hx if gender appropriate:  Past Surgical History:   Procedure Laterality Date    ANGIOPLASTY      CARDIAC CATHETERIZATION       Family History:  Family History   Problem Relation Age of Onset    No Known Problems Mother     Stroke Father     Hypertension Father      Social History:  Social History     Tobacco Use    Smoking status:  "Former Smoker   Substance Use Topics    Alcohol use: Yes     Comment: social    Drug use: Not on file         Objective   Last 24 Hour Vital Signs:  BP  Min: 108/74  Max: 147/77  Temp  Av.1 °F (37.3 °C)  Min: 98.2 °F (36.8 °C)  Max: 100 °F (37.8 °C)  Pulse  Av.5  Min: 72  Max: 117  Resp  Av.1  Min: 18  Max: 22  SpO2  Av.9 %  Min: 88 %  Max: 97 %  Height  Av' 8" (172.7 cm)  Min: 5' 8" (172.7 cm)  Max: 5' 8" (172.7 cm)  Weight  Av.3 kg (172 lb 10.1 oz)  Min: 77.1 kg (170 lb)  Max: 79.5 kg (175 lb 4.3 oz)        Physical Examination:  Physical Exam  Defer to primary team ROS due to COVID19 testing    Laboratory:  CBC:   Lab Results   Component Value Date    WBC 2.78 (L) 2020    HGB 16.9 2020     2020    MCV 91 2020    RDW 13.0 2020       Estimated Creatinine Clearance: 57 mL/min (based on SCr of 1.1 mg/dL).        Assessment     Patient presented to [facility] ED 3/21/20 with nonproductive cough, low grade fever/chills and SOB x 1 week. Patient denies recent travel. His wife has been ill with similar symptoms. Patient called his PCP due to concern for COVID 19. He did not meet testing criteria at the time. He was given prescription for po antibiotics. Patient reports no improvement despite antibiotics and OTC decongestants.      Recommendations   COVID 19 Rule Out  - Upon presentation, , spo2 88% on room air -> 92% on 4.5L NC, otherwise vitals stable  - afebrile since admission  - Physical exam: normal  - elevated: ESR 26, , troponin 0.046  - WBC 2.78 (absolute lymphocyte count 0.3 )  - without abnormality: flu , lactic acid 1, procalcitonin 0.07, UA  - following blood cultures from 3/21/20 -no growth to date  - Chest x-ray on 3/21/20: Ill-defined interstitial opacities.  - Social: lives in [place], [place].     - recommend hydroxychlorquine 400mg PO for 5 days per [facility] algorithm  - continue azithromycin  - Recommend monitor Qtc daily in " setting of hydroxychlorquine & azthromycin use  - follow up COVID 19 PCR from 3/21/20        Thank you for this consult. We will follow.      Tobi Mercado, DO PGY-1  [facility]

## 2020-03-22 NOTE — ASSESSMENT & PLAN NOTE
ASCVD (arteriosclerotic cardiovascular disease)  Hyperlipidemia   PAD (peripheral artery disease)  Continue home amlodipine 2.5 mg daily, atorvastatin 40 mg daily, clopidrogel 75 mg daily, lisinopril 40 mg daily, and metoprolol succinate 50 mg daily.

## 2020-03-22 NOTE — PLAN OF CARE
"VN Note: VN cued into patient's room for Q2 hr rounds. Patient reported to VN he had "diarrhea." Informed VN he needed new underwear and some new sheets. VN notified bedside nurse Eleuterio. VN will continue to follow.  "

## 2020-03-22 NOTE — ASSESSMENT & PLAN NOTE
ASCVD (arteriosclerotic cardiovascular disease)  Hyperlipidemia   PAD (peripheral artery disease)    Continue amlodipine 2.5 mg daily, atorvastatin, plavix, lisinopril 40 mg daily  and metoprolol 50 mg daily

## 2020-03-22 NOTE — PROGRESS NOTES
[facility]  Progress Note    Patient Name: Zhao Fischer  MRN: [id number]  Patient Class: OP- Observation   Admission Date: 3/21/2020  Length of Stay: 0 days  Attending Physician: Ezequiel Huang MD  Primary Care Provider: Mannie Russell MD        Subjective:     Principal Problem:Community acquired pneumonia of both lungs        HPI:  Zhao Fischer is a 74 year old white man with hypertension, hyperlipidemia, diabetes mellitus type 2, peripheral artery disease, arteriosclerotic cardiovascular disease. He lives in [place], [place]. He has been  since he was 16 years old. His primary care physician is Dr. Mannie Russell.    He contacted Dr. Russell on 3/16/2020 to request testing for COVID-19 due to nonproductive cough, shortness of breath, and fever for the past day, but he could not get tested due to limited outpatient testing. He was prescribed levofloxacin 500 mg daily.    He presented to [facility] on 3/21/2020 with persistent symptoms, including diarrhea, which is common with COVID-19 infection, and poor appetite. He reported that his wife had similar symptoms. He was hypoxic, with oxygen saturation of 88%. Chest X-ray showed interstitial lung opacities. WBC count was low at 2780. BNP, lactate, and procalcitonin were low. CRP was elevated at 154 mg/L. COVID-19 testing has been liberal in the emergency department (criteria of cough alone is enough) so he was tested. He was not given anything other than supplemental oxygen in the emergency department. He was admitted to [facility].     Overview/Hospital Course:  No notes on file    Interval History: Infectious Disease recommended hydroxychloroquine and azithromycin to treat possible COVID-19. I talked to his wife as well.    Review of Systems   Constitutional: Positive for appetite change.   Respiratory: Positive for cough and shortness of breath.       Objective:     Vital Signs (Most Recent):  Temp: 98.6 °F (37 °C) (03/22/20 1224)  Pulse: 99 (03/22/20 1224)  Resp: 18 (03/22/20 1224)  BP: 112/68 (03/22/20 1224)  SpO2: (!) 92 % (03/22/20 0941) Vital Signs (24h Range):  Temp:  [98.2 °F (36.8 °C)-100 °F (37.8 °C)] 98.6 °F (37 °C)  Pulse:  [] 99  Resp:  [18-22] 18  SpO2:  [88 %-97 %] 92 %  BP: (108-147)/(68-82) 112/68     Weight: 79.5 kg (175 lb 4.3 oz)  Body mass index is 26.65 kg/m².    Intake/Output Summary (Last 24 hours) at 3/22/2020 1553  Last data filed at 3/22/2020 1400  Gross per 24 hour   Intake --   Output 101 ml   Net -101 ml      Physical Exam   Constitutional: He is oriented to person, place, and time. He appears well-developed. No distress. Nasal cannula in place.   Pulmonary/Chest: Effort normal. No respiratory distress.   Neurological: He is alert and oriented to person, place, and time.   Psychiatric: He has a normal mood and affect.   Nursing note and vitals reviewed.      Significant Labs: All pertinent labs within the past 24 hours have been reviewed.    Significant Imaging: I have reviewed all pertinent imaging results/findings within the past 24 hours.   X-Ray Chest AP Portable 3/21/20:   FINDINGS: Monitoring EKG leads are present.  The trachea is unremarkable.  There are calcifications of the aortic knob.  The cardiomediastinal silhouette is within normal limits.  The hemidiaphragms are unremarkable.  There is no evidence of free air beneath the hemidiaphragms.  There are no pleural effusions.  There is no evidence of a pneumothorax.  There is no evidence of pneumomediastinum.  There are ill-defined interstitial opacities.  The osseous structures are unremarkable.  Impression: Ill-defined interstitial opacities.  No focal consolidation.      Assessment/Plan:      * Community acquired pneumonia of both lungs  Suspected Covid-19 Virus Infection  Appreciate Infectious Disease. Give hydroxychloroquine and azithromycin.    Acute  respiratory failure with hypoxia  Due to pneumonia. Wean supplemental oxygen as tolerated.    Essential hypertension  ASCVD (arteriosclerotic cardiovascular disease)  Hyperlipidemia   PAD (peripheral artery disease)  Continue home amlodipine 2.5 mg daily, atorvastatin 40 mg daily, clopidrogel 75 mg daily, lisinopril 40 mg daily, and metoprolol succinate 50 mg daily.    Type 2 diabetes mellitus with diabetic peripheral angiopathy without gangrene, without long-term current use of insulin  Hold home glimepiride, empagliflozin. Insulin aspart sliding scale.      VTE Risk Mitigation (From admission, onward)         Ordered     IP VTE HIGH RISK PATIENT  Once      03/21/20 2227     Place sequential compression device  Until discontinued      03/21/20 2227                      Ezequiel Huang MD  Department of Hospital Medicine   [facility]

## 2020-03-22 NOTE — SUBJECTIVE & OBJECTIVE
History reviewed. No pertinent past medical history.    Past Surgical History:   Procedure Laterality Date    ANGIOPLASTY      CARDIAC CATHETERIZATION         Review of patient's allergies indicates:   Allergen Reactions    Clindamycin Diarrhea       No current facility-administered medications on file prior to encounter.      Current Outpatient Medications on File Prior to Encounter   Medication Sig    amLODIPine (NORVASC) 2.5 MG tablet Take 1 tablet (2.5 mg total) by mouth once daily.    atorvastatin (LIPITOR) 40 MG tablet TK 1 T PO HS    clopidogrel (PLAVIX) 75 mg tablet TK 1 T PO D    empagliflozin (JARDIANCE) 10 mg Tab Take 10 mg by mouth once daily.    glimepiride (AMARYL) 2 MG tablet TK 1 T PO QD WITH FIRST MEAL    levoFLOXacin (LEVAQUIN) 500 MG tablet Take 1 tablet (500 mg total) by mouth once daily.    lisinopril (PRINIVIL,ZESTRIL) 40 MG tablet TAKE 1 TABLET BY MOUTH ONCE DAILY    metoprolol succinate (TOPROL-XL) 50 MG 24 hr tablet TK 1 T PO D    ONETOUCH ULTRASOFT LANCETS lancets TEST ONCE D    ONETOUCH VERIO Strp Use to test BS twice daily    fenofibrate (TRICOR) 145 MG tablet TK 1 T PO D     Family History     Problem Relation (Age of Onset)    Hypertension Father    No Known Problems Mother    Stroke Father        Tobacco Use    Smoking status: Former Smoker   Substance and Sexual Activity    Alcohol use: Yes     Comment: social    Drug use: Not on file    Sexual activity: Not on file     Review of Systems   Constitutional: Positive for chills, fatigue and fever.   Eyes: Negative for photophobia.   Respiratory: Positive for cough and shortness of breath. Negative for chest tightness and wheezing.    Cardiovascular: Negative for chest pain, palpitations and leg swelling.   Gastrointestinal: Negative for abdominal pain, diarrhea, nausea and vomiting.   Genitourinary: Negative for dysuria, flank pain and hematuria.   Musculoskeletal: Negative for back pain, myalgias and neck pain.   Skin:  Negative for rash and wound.   Neurological: Positive for weakness. Negative for dizziness, syncope and headaches.   Psychiatric/Behavioral: Negative for agitation.     Objective:     Vital Signs (Most Recent):  Temp: 99.1 °F (37.3 °C) (03/22/20 0545)  Pulse: (!) 117 (03/22/20 0545)  Resp: 18 (03/22/20 0545)  BP: 139/80 (03/22/20 0545)  SpO2: 96 % (03/21/20 2205) Vital Signs (24h Range):  Temp:  [98.2 °F (36.8 °C)-100 °F (37.8 °C)] 99.1 °F (37.3 °C)  Pulse:  [] 117  Resp:  [18-20] 18  SpO2:  [88 %-97 %] 96 %  BP: (108-147)/(73-80) 139/80     Weight: 79.5 kg (175 lb 4.3 oz)  Body mass index is 26.65 kg/m².    Physical Exam   Constitutional: He is oriented to person, place, and time. He appears well-developed and well-nourished. No distress.   HENT:   Head: Normocephalic and atraumatic.   Eyes: Pupils are equal, round, and reactive to light. Conjunctivae are normal.   Neck: Normal range of motion. Neck supple. No JVD present.   Cardiovascular: Normal rate, regular rhythm, normal heart sounds and intact distal pulses.   Pulmonary/Chest: Effort normal and breath sounds normal. No respiratory distress. He has no wheezes.   Abdominal: Soft. Bowel sounds are normal. He exhibits no distension. There is no tenderness. There is no guarding.   Musculoskeletal: Normal range of motion. He exhibits no edema or tenderness.   Neurological: He is alert and oriented to person, place, and time.   Skin: Skin is warm and dry. Capillary refill takes less than 2 seconds. No erythema.   Psychiatric: He has a normal mood and affect. His behavior is normal.         CRANIAL NERVES     CN III, IV, VI   Pupils are equal, round, and reactive to light.       Significant Labs:   BMP:   Recent Labs   Lab 03/21/20 1955   *   *   K 4.5   CL 98   CO2 21*   BUN 15   CREATININE 1.1   CALCIUM 8.5*     CBC:   Recent Labs   Lab 03/21/20 1955   WBC 2.78*   HGB 16.9   HCT 49.7        Urine Studies:   Recent Labs   Lab  03/21/20 2012   COLORU Yellow   APPEARANCEUA Clear   PHUR 6.0   SPECGRAV 1.010   PROTEINUA Trace*   GLUCUA 3+*   KETONESU Negative   BILIRUBINUA Negative   OCCULTUA Trace*   NITRITE Negative   UROBILINOGEN Negative   LEUKOCYTESUR Negative   RBCUA 0   WBCUA 1   BACTERIA Rare       Significant Imaging: I have reviewed all pertinent imaging results/findings within the past 24 hours.

## 2020-03-23 PROBLEM — R05.9 COUGH: Status: ACTIVE | Noted: 2020-01-01

## 2020-03-23 NOTE — PROCEDURES
"Zhao Fischer is a 74 y.o. male patient.    Temp: 97.8 °F (36.6 °C) (03/23/20 1130)  Pulse: 92 (03/23/20 1154)  Resp: (!) 30 (03/23/20 1154)  BP: (!) 195/125 (03/23/20 1154)  SpO2: 97 % (03/23/20 1154)  Weight: 73.9 kg (162 lb 14.7 oz) (03/23/20 1100)  Height: 5' 8" (172.7 cm) (03/22/20 0545)       Intubation  Date/Time: 3/23/2020 11:59 AM  Location procedure was performed: [facility] PULMONARY MEDICINE  Performed by: Nikolai Guevara MD  Authorized by: Nikolai Guevara MD   Assisting provider: Nikolai Smith MD  Pre-operative diagnosis: hypoxemic respiratory failure  Post-operative diagnosis: hypoxemic respiratory failure  Consent Done: Emergent Situation  Indications: respiratory distress,  hypoxemia and  respiratory failure  Description of findings: normal anatomy grade 1 view   Intubation method: hamilton.  Patient status: paralyzed (RSI)  Preoxygenation: BVM  Sedatives: etomidate  Paralytic: succinylcholine  Laryngoscope size: hamilton.  Tube size: 8.0 mm  Tube type: cuffed  Number of attempts: 1  Cricoid pressure: yes  Cords visualized: yes  Post-procedure assessment: chest rise and CO2 detector  Breath sounds: clear and equal and absent over the epigastrium  Cuff inflated: yes  ETT to teeth: 24 cm  Tube secured with: ETT rocha  Patient tolerance: Patient tolerated the procedure well with no immediate complications  Technical procedures used: n/a  Significant surgical tasks conducted by the assistant(s): n/a  Complications: No  Estimated blood loss (mL): 0  Specimens: No  Implants: No          Nikolai Guevara  3/23/2020  "

## 2020-03-23 NOTE — PLAN OF CARE
Patient resting in bed, AAOx4. Medications administered as ordered. Patient received PRN medication for cough and PRN albuterol for SOB. Asleep on and off through the shift. Continuous pulse ox at the bedside, patient at 92% on 15L venti mask. Encouraged to call with needs or concerns. Will continue to monitor.

## 2020-03-23 NOTE — CONSULTS
[facility]  Critical Care - Medicine  Consult Note    Patient Name: Zhao Fischer  MRN: [id number]  Admission Date: 3/21/2020  Hospital Length of Stay: 0 days  Code Status: Full Code  Attending Physician: Ezequiel Huang MD  Primary Care Provider: Mannie Russell MD   Principal Problem: Community acquired pneumonia of both lungs    Consults  Subjective:     HPI:   Mr. Fischer is a past medical history of DMII, HLD, HTN, and PAD who is currently hospitalized with shortness of breath and fever since last Sunday.  He is a current rule out for COVID 19 infection.  He was originally on the floor, but unfortunately, his oxygen requirements gradually increased.  He was moved to the MICU on non-rebreather and required endotrachael intubation.  He is now sedated and mechanically ventilated.    Past Medical History:   Diagnosis Date    Diabetes mellitus, type 2     Hyperlipidemia     Hypertension     Peripheral artery disease        Past Surgical History:   Procedure Laterality Date    ANGIOPLASTY      CARDIAC CATHETERIZATION         Review of patient's allergies indicates:   Allergen Reactions    Clindamycin Diarrhea       Family History     Problem Relation (Age of Onset)    Hypertension Father    No Known Problems Mother    Stroke Father        Tobacco Use    Smoking status: Former Smoker   Substance and Sexual Activity    Alcohol use: Yes     Comment: social    Drug use: Not on file    Sexual activity: Not on file      Review of Systems  Objective:     Vital Signs (Most Recent):  Temp: 97.8 °F (36.6 °C) (03/23/20 1130)  Pulse: 87 (03/23/20 1256)  Resp: (!) 34 (03/23/20 1256)  BP: (!) 68/48 (03/23/20 1256)  SpO2: 95 % (03/23/20 1256) Vital Signs (24h Range):  Temp:  [97.8 °F (36.6 °C)-98.7 °F (37.1 °C)] 97.8 °F (36.6 °C)  Pulse:  [] 87  Resp:  [17-60] 34  SpO2:  [87 %-97 %] 95 %  BP: ()/() 68/48     Weight: 73.9 kg (162 lb 14.7 oz)  Body mass index is 24.77  kg/m².      Intake/Output Summary (Last 24 hours) at 3/23/2020 1338  Last data filed at 3/23/2020 1100  Gross per 24 hour   Intake 205 ml   Output 751 ml   Net -546 ml       Physical Exam   Constitutional: He is oriented to person, place, and time. He appears well-developed and well-nourished. No distress.   HENT:   Head: Normocephalic and atraumatic.   Mouth/Throat: No oropharyngeal exudate.   Eyes: Pupils are equal, round, and reactive to light. No scleral icterus.   Neck: Normal range of motion. Neck supple.   Cardiovascular: Normal rate and regular rhythm. Exam reveals no gallop and no friction rub.   No murmur heard.  Pulmonary/Chest: He is in respiratory distress. He has no wheezes. He has rales. He exhibits no tenderness.   Increased WOB, diffuse crackles present bilaterally   Abdominal: Soft. Bowel sounds are normal. He exhibits no distension. There is no tenderness.   Musculoskeletal: Normal range of motion. He exhibits no edema.   Neurological: He is alert and oriented to person, place, and time. No cranial nerve deficit.   Skin: Skin is warm and dry. He is not diaphoretic.   Psychiatric: He has a normal mood and affect. His behavior is normal. Judgment and thought content normal.       Vents:  Vent Mode: A/C (03/23/20 1256)  Set Rate: 18 BPM (03/23/20 1256)  Vt Set: 420 mL (03/23/20 1256)  Pressure Support: 0 cmH20 (03/23/20 1256)  PEEP/CPAP: 12 cmH20 (03/23/20 1256)  Oxygen Concentration (%): 100 (03/23/20 1256)  Peak Airway Pressure: 24 cmH2O (03/23/20 1256)  Plateau Pressure: 0 cmH20 (03/23/20 1256)  Total Ve: 13.4 mL (03/23/20 1256)  F/VT Ratio<105 (RSBI): (!) 50.22 (03/23/20 1256)    Lines/Drains/Airways     Drain                 NG/OG Tube 03/23/20 1157 orogastric Center mouth less than 1 day         Urethral Catheter 03/23/20 1206 less than 1 day          Airway                 Airway - Non-Surgical 03/23/20 1148 Endotracheal Tube less than 1 day          Peripheral Intravenous Line                  Peripheral IV - Single Lumen 03/21/20 1755 20 G Right Antecubital 1 day         Peripheral IV - Single Lumen 03/23/20 1158 20 G Right Hand less than 1 day         Peripheral IV - Single Lumen 03/23/20 1201 18 G Left Forearm less than 1 day                Significant Labs:    CBC/Anemia Profile:  Recent Labs   Lab 03/21/20 1955   WBC 2.78*   HGB 16.9   HCT 49.7      MCV 91   RDW 13.0        Chemistries:  Recent Labs   Lab 03/21/20 1955   *   K 4.5   CL 98   CO2 21*   BUN 15   CREATININE 1.1   CALCIUM 8.5*   ALBUMIN 3.1*   PROT 6.8   BILITOT 0.4   ALKPHOS 68   ALT 38   AST 66*       All pertinent labs within the past 24 hours have been reviewed.    Significant Imaging: I have reviewed all pertinent imaging results/findings within the past 24 hours.    Assessment/Plan:     Active Diagnoses:    Diagnosis Date Noted POA    PRINCIPAL PROBLEM:  Community acquired pneumonia of both lungs [J18.9] 03/22/2020 Yes    Cough [R05] 03/23/2020 Yes    Suspected Covid-19 Virus Infection [R68.89] 03/22/2020 Yes    Acute respiratory failure with hypoxia [J96.01] 03/21/2020 Yes    Essential hypertension [I10] 01/09/2020 Yes     Chronic    Hyperlipidemia, mixed [E78.2] 01/09/2020 Yes     Chronic    PAD (peripheral artery disease) [I73.9] 01/09/2020 Yes     Chronic    Type 2 diabetes mellitus with diabetic peripheral angiopathy without gangrene, without long-term current use of insulin [E11.51] 01/09/2020 Yes     Chronic    ASCVD (arteriosclerotic cardiovascular disease) [I25.10] 01/09/2020 Yes     Chronic      Problems Resolved During this Admission:       CNS  # need for sedation  - continue fentanyl and propofol gtt  - daily sat/sbt    PULM  # COVID 19 rule out  # bilateral pneumonia  # hypoxemic respiratory failure  # ARDS  - continue low tidal volume ventilation  - continue azithromycin and plaquenil x 5 days.  - titrate vent according to the ARDSnet protocol to keep sats >90%  - ensure net negative fluid  balance.    CARDS  # HTN  # HLD  # PAD  # NSTEMI  - hold amlodipine, lisinopril while sedated.  Resume if hypertension occurs.  - continue statin, plavix, fenofibrate  - troponins down-trending now.    GI/FEN  # need for tube feeds  - will start tube feeds.    RENAL  # no acute issues at this time  - daily labs to monitor for renal function.    ID  # COVID 19 rule out  - continue therapy as above.    HEME  # no acute issues at this time.        Thank you for your consult. I will follow-up with patient. Please contact us if you have any additional questions.     Nikolai Guevara MD  Critical Care - Medicine  [facility]

## 2020-03-23 NOTE — HOSPITAL COURSE
He was put on hydroxychloroquine and azithromycin. His hypoxia worsened overnight between 3/22/2020 and 3/23/2020. Pulmonology was consulted and he was intubated. COVID-19 result was reported positive on 3/24/2020. He developed acute kidney injury on 3/24/2020. Palliative Care was consulted. Hydroxychloroquine was stopped early due to hypoglycemia. Nephrology was consulted on 3/26/2020. A left internal jugular trialysis catheter was placed on 3/27/2020. He finished a course of azithromycin on 3/27/2020. He started continuous renal replacement therapy and was given furosemide infusion to augment urine output. Pulmonology diagnosed him with chronic obstructive pulmonary disease and started albuterol-ipratropium nebulizer treatments. Repeat chest X-ray on 4/2/2020 showed similar lung opacities as recent X-rays. He developed new fever on 4/3/2020, so cefepime and vancomycin were started. He developed atrial fibrillation and ventricular tachycardia on 4/4/2020. Pulmonology discussed his lack of improvement and poor prognosis with his wife. He was made DNR and transitioned to comfort measures only. He was extubated in the afternoon of 4/6/2020.

## 2020-03-23 NOTE — SUBJECTIVE & OBJECTIVE
Interval History: Still appears comfortable but requiring a lot of supplemental oxygen now. No new complaints otherwise. Pulmonology recommends transfer to ICU.    Review of Systems   Respiratory: Positive for cough and shortness of breath.    Gastrointestinal: Negative for nausea and vomiting.     Objective:     Vital Signs (Most Recent):  Temp: 98 °F (36.7 °C) (03/23/20 1033)  Pulse: 105 (03/23/20 0859)  Resp: (!) 30 (03/23/20 1033)  BP: 132/74 (03/23/20 0859)  SpO2: (!) 90 % (03/23/20 1033) Vital Signs (24h Range):  Temp:  [97.8 °F (36.6 °C)-98.7 °F (37.1 °C)] 98 °F (36.7 °C)  Pulse:  [] 105  Resp:  [17-32] 30  SpO2:  [87 %-92 %] 90 %  BP: (112-149)/(67-77) 132/74     Weight: 73.3 kg (161 lb 9.6 oz)  Body mass index is 24.57 kg/m².    Intake/Output Summary (Last 24 hours) at 3/23/2020 1122  Last data filed at 3/23/2020 1100  Gross per 24 hour   Intake 205 ml   Output 751 ml   Net -546 ml      Physical Exam   Constitutional: He is oriented to person, place, and time. He appears well-developed. No distress. Face mask in place.   Pulmonary/Chest: Tachypnea noted. No respiratory distress.   Neurological: He is alert and oriented to person, place, and time.   Psychiatric: Cognition and memory are normal.   Nursing note and vitals reviewed.      Significant Labs: All pertinent labs within the past 24 hours have been reviewed.    Significant Imaging: I have reviewed all pertinent imaging results/findings within the past 24 hours.

## 2020-03-23 NOTE — CHAPLAIN
Provided support on the phone.  Wife and patient have not been apart since they were 16 years old.  Both are anxious, patient is crying in his room and believes he is going to die according to wife.  Patient is being transferred to ICU.

## 2020-03-23 NOTE — PLAN OF CARE
VIRTUAL NURSE 2 HOUR ROUNDS:  Cued into patient's room.  Patient resting comfortably in bed with eyes closed; respirations even and unlabored.  No distress noted.  Will cont to monitor.

## 2020-03-23 NOTE — ASSESSMENT & PLAN NOTE
Cough  Suspected Covid-19 Virus Infection  Acute respiratory failure with hypoxia  Appreciate Infectious Disease and Pulmonology. Giving hydroxychloroquine and azithromycin. Transfer to the ICU due to risk or needing intubation.

## 2020-03-23 NOTE — PLAN OF CARE
VIRTUAL NURSE 2 HOUR ROUNDS:  Cued into patient's room.  Patient resting comfortably in bed with eyes closed; respirations even and unlabored.  No distress noted. Cont. Pulse ox 97%.  Will cont to monitor.

## 2020-03-23 NOTE — CHAPLAIN
Ivan farfan called me.  Pt. In isolation, wife has symptoms at home.  Patient's cell phone needs charging to keep communication with wife.  Pt's nurse retrieved pt's phone, dis infected it, and put it on  in nurses station. I called pt's wife for phone support.

## 2020-03-23 NOTE — PROGRESS NOTES
[facility] Hospital Medicine  Progress Note    Patient Name: Zhao Fischer  MRN: [id number]  Patient Class: OP- Observation   Admission Date: 3/21/2020  Length of Stay: 0 days  Attending Physician: Ezequiel Huang MD  Primary Care Provider: Mannie Russell MD        Subjective:     Principal Problem:Community acquired pneumonia of both lungs        HPI:  Zhao Fischer is a 74 year old white man with hypertension, hyperlipidemia, diabetes mellitus type 2, peripheral artery disease, arteriosclerotic cardiovascular disease. He lives in [place], [place]. He has been  since he was 16 years old. His primary care physician is Dr. Mannie Russell.    He contacted Dr. Russell on 3/16/2020 to request testing for COVID-19 due to nonproductive cough, shortness of breath, and fever for the past day, but he could not get tested due to limited outpatient testing. He was prescribed levofloxacin 500 mg daily.    He presented to [facility] Emergency Department on 3/21/2020 with persistent symptoms, including diarrhea, which is common with COVID-19 infection, and poor appetite. He reported that his wife had similar symptoms. He was hypoxic, with oxygen saturation of 88%. Chest X-ray showed interstitial lung opacities. WBC count was low at 2780. BNP, lactate, and procalcitonin were low. CRP was elevated at 154 mg/L. COVID-19 testing has been liberal in the emergency department (criteria of cough alone is enough) so he was tested. He was not given anything other than supplemental oxygen in the emergency department. He was admitted to [facility].     Overview/Hospital Course:  He was put on hydroxychloroquine and azithromycin. His hypoxia worsened overnight between 3/22/2020 and 3/23/2020. Pulmonology was consulted.     Interval History: Still appears comfortable but requiring a lot of supplemental oxygen now. No new complaints otherwise. Pulmonology recommends transfer to  ICU.    Review of Systems   Respiratory: Positive for cough and shortness of breath.    Gastrointestinal: Negative for nausea and vomiting.     Objective:     Vital Signs (Most Recent):  Temp: 98 °F (36.7 °C) (03/23/20 1033)  Pulse: 105 (03/23/20 0859)  Resp: (!) 30 (03/23/20 1033)  BP: 132/74 (03/23/20 0859)  SpO2: (!) 90 % (03/23/20 1033) Vital Signs (24h Range):  Temp:  [97.8 °F (36.6 °C)-98.7 °F (37.1 °C)] 98 °F (36.7 °C)  Pulse:  [] 105  Resp:  [17-32] 30  SpO2:  [87 %-92 %] 90 %  BP: (112-149)/(67-77) 132/74     Weight: 73.3 kg (161 lb 9.6 oz)  Body mass index is 24.57 kg/m².    Intake/Output Summary (Last 24 hours) at 3/23/2020 1122  Last data filed at 3/23/2020 1100  Gross per 24 hour   Intake 205 ml   Output 751 ml   Net -546 ml      Physical Exam   Constitutional: He is oriented to person, place, and time. He appears well-developed. No distress. Face mask in place.   Pulmonary/Chest: Tachypnea noted. No respiratory distress.   Neurological: He is alert and oriented to person, place, and time.   Psychiatric: Cognition and memory are normal.   Nursing note and vitals reviewed.      Significant Labs: All pertinent labs within the past 24 hours have been reviewed.    Significant Imaging: I have reviewed all pertinent imaging results/findings within the past 24 hours.      Assessment/Plan:      * Community acquired pneumonia of both lungs  Cough  Suspected Covid-19 Virus Infection  Acute respiratory failure with hypoxia  Appreciate Infectious Disease and Pulmonology. Giving hydroxychloroquine and azithromycin. Transfer to the ICU due to risk or needing intubation.    Acute respiratory failure with hypoxia  See primary problem.    Essential hypertension  ASCVD (arteriosclerotic cardiovascular disease)  Hyperlipidemia   PAD (peripheral artery disease)  Continue home amlodipine 2.5 mg daily, atorvastatin 40 mg daily, clopidrogel 75 mg daily, lisinopril 40 mg daily, and metoprolol succinate 50 mg  daily.    Type 2 diabetes mellitus with diabetic peripheral angiopathy without gangrene, without long-term current use of insulin  Hold home glimepiride, empagliflozin. Insulin aspart sliding scale.      VTE Risk Mitigation (From admission, onward)         Ordered     IP VTE HIGH RISK PATIENT  Once      03/21/20 2227     Place sequential compression device  Until discontinued      03/21/20 2227                      Ezequiel Huang MD  Department of Hospital Medicine   [facility]

## 2020-03-23 NOTE — PLAN OF CARE
Patient presents with    Cough       pt c/o dry cough, denies any sick contacts     Shortness of Breath       reports sob with cough, denies sob with ambulation    Fever       pt reports fever that started last sunday relieved with tylenol at home         HPI: Zhao Fischer is a 75 yo male with hypertension, hyperlipidemia, Type 2 Diabetes Mellitus and peripheral artery disease. His primary care physician is Dr. Mannie Russell. He lives in [place], [place].      Patient presented [facility] ED [facility]/21/20 with nonproductive cough, low grade fever/chills and SOB x 1 week. Patient denies recent travel. His wife has been ill with similar symptoms. Patient called his PCP due to concern for COVID 19. He did not meet testing criteria at the time. He was given prescription for po antibiotics. Patient reports no improvement despite antibiotics and OTC decongestants.     The pt's currently in ICU,intubated. The Sw spoke to the pt's wife Heidi Fischer [phone] who was crying a lot over the phone. The Sw offered a lot of emotional support to her. The couple live alone in [place].The pt and his wife have a huge support system. The pt was very active and independent prior to admit and didn't use any dme. The pt's wife states they used to go dancing weekly. They have been together since they were 16 years old and this is a very very difficult time for her b/c they've never been apart. The pt's wife states she couldn't get tested b/c she was told she wasn't sick enough. Her and the pt had been in quarantine for the past 2 weeks. Her family,neighbors and friends drop her stuff off at the door but they don't come in. One of the pt's family members will transport him home at d/c. The Sw left her name and contact info and encouraged her to call should she have any questions or concerns. The Sw will continue to follow the pt throughout his transitions of care and will assist with any d/c needs.        03/23/20 1531   Discharge Assessment    Assessment Type Discharge Planning Assessment   Confirmed/corrected address and phone number on facesheet? Yes   Assessment information obtained from? Caregiver   Prior to hospitilization cognitive status: Alert/Oriented   Prior to hospitalization functional status: Independent   Current cognitive status: Coma/Sedated/Intubated   Current Functional Status: Completely Dependent   Lives With spouse   Is patient able to care for self after discharge? Unable to determine at this time (comments)   Who are your caregiver(s) and their phone number(s)? Heidi Fischer(wife)[phone]   Patient's perception of discharge disposition home or selfcare   Readmission Within the Last 30 Days no previous admission in last 30 days   Patient currently being followed by outpatient case management? No   Patient currently receives any other outside agency services? No   Equipment Currently Used at Home glucometer   Do you have any problems affording any of your prescribed medications? No  ([facility] [place]   Is the patient taking medications as prescribed? yes   Does the patient have transportation home? Yes   Transportation Anticipated family or friend will provide   Does the patient receive services at the [facility]mad[facility] Clinic? No   Discharge Plan A Home with family   Discharge Plan B Home Health   DME Needed Upon Discharge    (TBD)   Patient/Family in Agreement with Plan yes

## 2020-03-23 NOTE — PROGRESS NOTES
Notified NP of patient's oxygen saturation and recommendation from respiratory for high flow nasal cannula. Order received, respiratory coming to bedside.

## 2020-03-23 NOTE — PLAN OF CARE
VIRTUAL NURSE 2 HOUR ROUNDS:  Cued into patient's room.  Patient resting comfortably in bed with eyes closed; respirations even and unlabored.  No distress noted. Pulse ox reading 92 %. Will cont to monitor.

## 2020-03-23 NOTE — PLAN OF CARE
ID Staff - Besch    Events noted - now in ICU and intubated for progressive respiratory failure    Typical presentation for CoVid-19 disease - ~ 1week dry cough, fever and associated symptoms, failed trial of levaquin - to ED, febrile, 88% RA, CXR ill-defined interstitial infiltrates    WBC low (), normal pro-bradford and lactate  Minimal transaminitis  Elevated troponin    Day 3 Hydroxychloroquine     Following

## 2020-03-23 NOTE — EICU
Intervention Initiated From:  Bedside    Hood Communicated with Bedside Nurse regarding:  Respiratory    Comments:   eLERT received for intubation. Dr Guevara and Dr Smith at bedside for intubation. Pt intubated at 1148 w/ an 8.0 cm ETT. Vitals monitored throughout procedure. No adverse events noted and pt tolerated well. Propofol gtt started after intubation for comfort.     Time in: 1145  Time out: 1205

## 2020-03-23 NOTE — PLAN OF CARE
VN cued into patients room for q2h rounding - Patient is in no acute distress at this time.  Call light within reach.  Will continue to monitor closely.

## 2020-03-23 NOTE — NURSING
Pt transferred to ICU via bed with facemask non rebreather on 15L w/ Olivia BLAS and Key RN at bedside.. Report given to ROOPA Hunter. Rudolph sent down with pt. Wife, Heidi, notified.

## 2020-03-23 NOTE — PLAN OF CARE
VN cued into patients room for q2h rounding - patient resting in bed in no acute distress at this time. O2 cont to be admin via mask. Patient states today his respiration rate has increase. VN coached patient to slow breathing down with slow deep breaths. Call bell within reach. Will continue to monitor and be available to intervene PRN.

## 2020-03-23 NOTE — PLAN OF CARE
VIRTUAL NURSE 2 HOUR ROUNDS:  Cued into patient's room.  Patient resting comfortably in bed with eyes closed; respirations even and unlabored.  No distress noted. O2 sats 95 %  Will cont to monitor.

## 2020-03-24 PROBLEM — U07.1 ACUTE RESPIRATORY DISEASE DUE TO COVID-19 VIRUS: Status: ACTIVE | Noted: 2020-01-01

## 2020-03-24 PROBLEM — J80 ACUTE RESPIRATORY DISTRESS SYNDROME (ARDS) DUE TO SEVERE ACUTE RESPIRATORY SYNDROME CORONAVIRUS 2 (SARS-COV-2): Status: ACTIVE | Noted: 2020-01-01

## 2020-03-24 PROBLEM — U07.1 PNEUMONIA DUE TO SEVERE ACUTE RESPIRATORY SYNDROME CORONAVIRUS 2 (SARS-COV-2): Status: ACTIVE | Noted: 2020-01-01

## 2020-03-24 PROBLEM — J06.9 ACUTE RESPIRATORY DISEASE DUE TO COVID-19 VIRUS: Status: ACTIVE | Noted: 2020-01-01

## 2020-03-24 PROBLEM — Z51.5 PALLIATIVE CARE ENCOUNTER: Status: ACTIVE | Noted: 2020-01-01

## 2020-03-24 PROBLEM — Z71.89 GOALS OF CARE, COUNSELING/DISCUSSION: Status: ACTIVE | Noted: 2020-01-01

## 2020-03-24 PROBLEM — J12.82 PNEUMONIA DUE TO SEVERE ACUTE RESPIRATORY SYNDROME CORONAVIRUS 2 (SARS-COV-2): Status: ACTIVE | Noted: 2020-01-01

## 2020-03-24 PROBLEM — Z71.89 COUNSELING REGARDING ADVANCE CARE PLANNING AND GOALS OF CARE: Status: ACTIVE | Noted: 2020-01-01

## 2020-03-24 PROBLEM — N17.9 AKI (ACUTE KIDNEY INJURY): Status: ACTIVE | Noted: 2020-01-01

## 2020-03-24 PROBLEM — R79.89 ELEVATED LFTS: Status: ACTIVE | Noted: 2020-01-01

## 2020-03-24 PROBLEM — U07.1 ACUTE RESPIRATORY DISTRESS SYNDROME (ARDS) DUE TO SEVERE ACUTE RESPIRATORY SYNDROME CORONAVIRUS 2 (SARS-COV-2): Status: ACTIVE | Noted: 2020-01-01

## 2020-03-24 NOTE — SUBJECTIVE & OBJECTIVE
Interval History: *    Review of Systems   Unable to perform ROS: Intubated     Objective:     Vital Signs (Most Recent):  Temp: 98.5 °F (36.9 °C) (03/24/20 0800)  Pulse: 102 (03/24/20 1444)  Resp: (!) 36 (03/24/20 1444)  BP: 117/74 (03/24/20 1430)  SpO2: (!) 94 % (03/24/20 1444) Vital Signs (24h Range):  Temp:  [97.1 °F (36.2 °C)-98.5 °F (36.9 °C)] 98.5 °F (36.9 °C)  Pulse:  [] 102  Resp:  [11-40] 36  SpO2:  [93 %-98 %] 94 %  BP: ()/(56-80) 117/74     Weight: 73.9 kg (162 lb 14.7 oz)  Body mass index is 24.77 kg/m².    Intake/Output Summary (Last 24 hours) at 3/24/2020 1459  Last data filed at 3/24/2020 0900  Gross per 24 hour   Intake 261.03 ml   Output 365 ml   Net -103.97 ml      Physical Exam   Constitutional: He is oriented to person, place, and time. He appears well-developed. No distress. Face mask in place.   Pulmonary/Chest: Tachypnea noted. No respiratory distress.   Neurological: He is alert and oriented to person, place, and time.   Psychiatric: Cognition and memory are normal.   Nursing note and vitals reviewed.      Significant Labs:   ABGs:   Recent Labs   Lab 03/23/20  1255   PH 7.344*   PCO2 37.0   HCO3 20.1*   POCSATURATED 95   BE -6     Blood Culture: No results for input(s): LABBLOO in the last 48 hours.  BMP:   Recent Labs   Lab 03/24/20  0646   *   *   K 4.7      CO2 18*   BUN 54*   CREATININE 2.0*   CALCIUM 8.1*   MG 2.6     CBC:   Recent Labs   Lab 03/24/20  0646   WBC 6.84   HGB 15.9   HCT 47.4        CMP:   Recent Labs   Lab 03/24/20  0646   *   K 4.7      CO2 18*   *   BUN 54*   CREATININE 2.0*   CALCIUM 8.1*   PROT 5.5*   ALBUMIN 2.3*   BILITOT 0.4   ALKPHOS 68   AST 91*   ALT 56*   ANIONGAP 12   EGFRNONAA 32*     Lactic Acid: No results for input(s): LACTATE in the last 48 hours.  TSH: No results for input(s): TSH in the last 4320 hours.  Urine Culture: No results for input(s): LABURIN in the last 48 hours.  Urine Studies: No  results for input(s): COLORU, APPEARANCEUA, PHUR, SPECGRAV, PROTEINUA, GLUCUA, KETONESU, BILIRUBINUA, OCCULTUA, NITRITE, UROBILINOGEN, LEUKOCYTESUR, RBCUA, WBCUA, BACTERIA, SQUAMEPITHEL, HYALINECASTS in the last 48 hours.    Invalid input(s): MYRA    Significant Imaging: I have reviewed all pertinent imaging results/findings within the past 24 hours.

## 2020-03-24 NOTE — ASSESSMENT & PLAN NOTE
ASCVD (arteriosclerotic cardiovascular disease)  Hyperlipidemia   PAD (peripheral artery disease)  Continue home amlodipine 2.5 mg daily, atorvastatin 40 mg daily, clopidrogel 75 mg daily.  Hold Metoprolol succinate   Hold lisinopril due to worsening renal function

## 2020-03-24 NOTE — PROGRESS NOTES
[facility]  Critical Care - Medicine  Consult Note    Patient Name: Zhao Fischer  MRN: [id number]  Admission Date: 3/21/2020  Hospital Length of Stay: 1 days  Code Status: Full Code  Attending Physician: Rafaela Romero*  Primary Care Provider: Mannie Russell MD   Principal Problem: Community acquired pneumonia of both lungs    Consults  Subjective:     Interval History:  Overnight the patient remained on fentanyl and propofol.  His ventilator settings this am are 12 of PEEP and 60% FiO2.  His sats are mid 90s.  He is rousable easily.  Mildly hypotensive but not yet on pressors.  Renal function worse today but UOP seems ok thus far.    Past Medical History:   Diagnosis Date    Diabetes mellitus, type 2     Hyperlipidemia     Hypertension     Peripheral artery disease        Past Surgical History:   Procedure Laterality Date    ANGIOPLASTY      CARDIAC CATHETERIZATION         Review of patient's allergies indicates:   Allergen Reactions    Clindamycin Diarrhea       Family History     Problem Relation (Age of Onset)    Hypertension Father    No Known Problems Mother    Stroke Father        Tobacco Use    Smoking status: Former Smoker   Substance and Sexual Activity    Alcohol use: Yes     Comment: social    Drug use: Not on file    Sexual activity: Not on file      Review of Systems  Objective:     Vital Signs (Most Recent):  Temp: 98.5 °F (36.9 °C) (03/24/20 0800)  Pulse: 89 (03/24/20 0845)  Resp: (!) 30 (03/24/20 0845)  BP: 107/70 (03/24/20 0845)  SpO2: 95 % (03/24/20 0845) Vital Signs (24h Range):  Temp:  [97.1 °F (36.2 °C)-98.5 °F (36.9 °C)] 98.5 °F (36.9 °C)  Pulse:  [] 89  Resp:  [24-60] 30  SpO2:  [88 %-98 %] 95 %  BP: ()/() 107/70     Weight: 73.9 kg (162 lb 14.7 oz)  Body mass index is 24.77 kg/m².      Intake/Output Summary (Last 24 hours) at 3/24/2020 0904  Last data filed at 3/24/2020 0900  Gross per 24 hour   Intake 161.03 ml   Output 635 ml   Net  -473.97 ml       Physical Exam   Constitutional: He is oriented to person, place, and time. He appears well-developed and well-nourished. No distress.   HENT:   Head: Normocephalic and atraumatic.   Mouth/Throat: No oropharyngeal exudate.   Eyes: Pupils are equal, round, and reactive to light. No scleral icterus.   Neck: Normal range of motion. Neck supple.   Cardiovascular: Normal rate and regular rhythm. Exam reveals no gallop and no friction rub.   No murmur heard.  Pulmonary/Chest: He is in respiratory distress. He has no wheezes. He has rales. He exhibits no tenderness.   Increased WOB, diffuse crackles present bilaterally   Abdominal: Soft. Bowel sounds are normal. He exhibits no distension. There is no tenderness.   Musculoskeletal: Normal range of motion. He exhibits no edema.   Neurological: He is alert and oriented to person, place, and time. No cranial nerve deficit.   Skin: Skin is warm and dry. He is not diaphoretic.   Psychiatric: He has a normal mood and affect. His behavior is normal. Judgment and thought content normal.       Vents:  Vent Mode: A/C (03/24/20 0807)  Set Rate: 18 BPM (03/24/20 0807)  Vt Set: 420 mL (03/24/20 0807)  Pressure Support: 0 cmH20 (03/24/20 0807)  PEEP/CPAP: 14 cmH20 (03/24/20 0807)  Oxygen Concentration (%): 50 (03/24/20 0845)  Peak Airway Pressure: 21 cmH2O (03/24/20 0807)  Plateau Pressure: 0 cmH20 (03/24/20 0807)  Total Ve: 13 mL (03/24/20 0807)  F/VT Ratio<105 (RSBI): (!) 69.51 (03/24/20 0807)    Lines/Drains/Airways     Drain                 NG/OG Tube 03/23/20 1157 orogastric Center mouth less than 1 day         Urethral Catheter 03/23/20 1206 less than 1 day          Airway                 Airway - Non-Surgical 03/23/20 1148 Endotracheal Tube less than 1 day          Peripheral Intravenous Line                 Peripheral IV - Single Lumen 03/21/20 1755 20 G Right Antecubital 2 days         Peripheral IV - Single Lumen 03/23/20 1158 20 G Right Hand less than 1 day          Peripheral IV - Single Lumen 03/23/20 1201 18 G Left Forearm less than 1 day                Significant Labs:    CBC/Anemia Profile:  Recent Labs   Lab 03/24/20  0646   WBC 6.84   HGB 15.9   HCT 47.4      MCV 91   RDW 13.0        Chemistries:  Recent Labs   Lab 03/24/20  0646   *   K 4.7      CO2 18*   BUN 54*   CREATININE 2.0*   CALCIUM 8.1*   ALBUMIN 2.3*   PROT 5.5*   BILITOT 0.4   ALKPHOS 68   ALT 56*   AST 91*   MG 2.6   PHOS 5.1*       All pertinent labs within the past 24 hours have been reviewed.    Significant Imaging: I have reviewed all pertinent imaging results/findings within the past 24 hours.    Assessment/Plan:     Active Diagnoses:    Diagnosis Date Noted POA    PRINCIPAL PROBLEM:  Community acquired pneumonia of both lungs [J18.9] 03/22/2020 Yes    Cough [R05] 03/23/2020 Yes    Suspected Covid-19 Virus Infection [R68.89] 03/22/2020 Yes    Acute respiratory failure with hypoxia [J96.01] 03/21/2020 Yes    Essential hypertension [I10] 01/09/2020 Yes     Chronic    Hyperlipidemia, mixed [E78.2] 01/09/2020 Yes     Chronic    PAD (peripheral artery disease) [I73.9] 01/09/2020 Yes     Chronic    Type 2 diabetes mellitus with diabetic peripheral angiopathy without gangrene, without long-term current use of insulin [E11.51] 01/09/2020 Yes     Chronic    ASCVD (arteriosclerotic cardiovascular disease) [I25.10] 01/09/2020 Yes     Chronic      Problems Resolved During this Admission:       CNS  # need for sedation  - continue fentanyl and propofol gtt  - daily sat/sbt    PULM  # COVID 19 rule out  # bilateral pneumonia  # hypoxemic respiratory failure  # ARDS  - continue low tidal volume ventilation  - continue azithromycin and plaquenil x 5 days.  - titrate vent according to the ARDSnet protocol to keep sats >90%  - ensure net negative fluid balance.    CARDS  # HTN  # HLD  # PAD  # NSTEMI  - hold amlodipine, lisinopril while sedated.  Resume if hypertension occurs.  - continue  statin, plavix, fenofibrate  - troponins down-trending now.    GI/FEN  # need for tube feeds  - will start tube feeds today.    RENAL  # KEYUR  # metabolic acidosis  # hyperphosphatemia  - daily labs to monitor for renal function.  If UOP decreases, he will need placement of HD catheter and involvement of nephrology.  - he is actually seemingly volume down at this time.  We will administer a 500cc bolus to evaluate for improvement in renal output and function. Also may aid in hypotension.    ID  # COVID 19 rule out  - continue therapy as above.    HEME  # no acute issues at this time.        Thank you for your consult. I will follow-up with patient. Please contact us if you have any additional questions.     Nikolai Guevara MD  Critical Care - Medicine  [facility]

## 2020-03-24 NOTE — HPI
"Patient is a 74-year-old male with no past medical history on file who presents to the ED with the complaint of non productive cough, shortness of breath and fever since last Sunday. Pt states that he was prescribed PO antibiotics by his PCP for the aforementioned complaint, but that he continues to have symptoms that have slowly worsened. He denies any recent sick contacts, hx of immunosuppression. Pt denies any CP/nausea/vomiting/diarrhea/chills/night sweats/abdominal pain.     Palliative medicine spoke with Ms. Gordon (spouse) this pm for emotional support. Discussed patient current clinic status. Patient vent settings increased on today. Wife in denial of patient not doing well. "My PCP saw his chart and he is doing well. He said his oxygen level is doing good and his blood pressure. I know he is going to pull out of this thing. I don't want my children down here because of this virus." Spouse given number to call ICU patient tired and does not want to fight anymore. Palliative medicine will continue to follow during this admission. Thank you for the consult.   "

## 2020-03-24 NOTE — ASSESSMENT & PLAN NOTE
Acute Respiratory Disease due to Covid-19 Virus  Acute respiratory failure with hypoxia    Appreciate Infectious Disease and Pulmonology.   Continue hydroxychloroquine and azithromycin.   COVID 19 positive  Consult palliative care for support

## 2020-03-24 NOTE — PLAN OF CARE
03/24/20 1008   Post-Acute Status   Post-Acute Authorization Placement   Post-Acute Placement Status Referrals Sent   The Sw spoke to Sanjuana at [facility] and faxed her the pt's info via [employer]. She has Epic access. She will call the Sw back after she reviews the pt's info.    12:20pm The Sw was informed by Sanjuana at [facility] they are currently not accepting any positive COVID pt's at this time.

## 2020-03-24 NOTE — PLAN OF CARE
Pt O2 down to 50% with patient SATS 91-95%. Frequent coughing noted with white/pink tinge sputum. PRN benzocaine spray provided moderate relief. Pt informed he is getting drips for comfort. Sedation titrated to maintain pt comfortable as possible with stable BP. Afebrile. Urine output 20ml/hr. Pt able to follow commands, turn in bed, and nods appropriately. Tolerating tube feedings. He was able to write that thinks he is dying and wanted to tell his wife he loves her and to be strong. Palliative consult called to Dr. Khan. Wife called and updated on status. Provided facetime communication between patient and wife.

## 2020-03-24 NOTE — SUBJECTIVE & OBJECTIVE
Interval History: Continue treatment. Full code. Continued emotional support to family.     Past Medical History:   Diagnosis Date    Diabetes mellitus, type 2     Hyperlipidemia     Hypertension     Peripheral artery disease        Past Surgical History:   Procedure Laterality Date    ANGIOPLASTY      CARDIAC CATHETERIZATION         Review of patient's allergies indicates:   Allergen Reactions    Clindamycin Diarrhea       Medications:  Continuous Infusions:   fentanyl 100 mcg/hr (03/24/20 1400)    propofoL 20 mcg/kg/min (03/24/20 0307)     Scheduled Meds:   amLODIPine  2.5 mg Oral Daily    atorvastatin  40 mg Oral QHS    azithromycin  500 mg Oral Daily    cefTRIAXone (ROCEPHIN) IVPB  1 g Intravenous Q24H    chlorhexidine  15 mL Mouth/Throat BID    clopidogreL  75 mg Oral Daily    dextromethorphan-guaifenesin  mg  2 tablet Oral BID    famotidine (PF)  20 mg Intravenous BID    fenofibrate  145 mg Oral Daily    heparin (porcine)  5,000 Units Subcutaneous Q12H    [START ON 3/25/2020] hydroxychloroquine  200 mg Per OG tube BID    metoprolol succinate  50 mg Oral Daily     PRN Meds:acetaminophen, albuterol, aluminum-magnesium hydroxide-simethicone, benzocaine, benzonatate, Dextrose 10% Bolus, Dextrose 10% Bolus, fentaNYL, glucagon (human recombinant), glucose, glucose, insulin aspart U-100, melatonin, ondansetron, sodium chloride 0.9%    Family History     Problem Relation (Age of Onset)    Hypertension Father    No Known Problems Mother    Stroke Father        Tobacco Use    Smoking status: Former Smoker   Substance and Sexual Activity    Alcohol use: Yes     Comment: social    Drug use: Not on file    Sexual activity: Not on file       Review of Systems  Objective:     Vital Signs (Most Recent):  Temp: 98.5 °F (36.9 °C) (03/24/20 0800)  Pulse: 100 (03/24/20 1515)  Resp: (!) 41 (03/24/20 1515)  BP: (!) 102/58 (03/24/20 1515)  SpO2: (!) 90 % (03/24/20 1515) Vital Signs (24h Range):  Temp:   [97.3 °F (36.3 °C)-98.5 °F (36.9 °C)] 98.5 °F (36.9 °C)  Pulse:  [] 100  Resp:  [11-45] 41  SpO2:  [90 %-98 %] 90 %  BP: ()/(56-81) 102/58     Weight: 73.9 kg (162 lb 14.7 oz)  Body mass index is 24.77 kg/m².    Review of Symptoms  Symptom Assessment (ESAS 0-10 scale)   ESAS 0 1 2 3 4 5 6 7 8 9 10   Pain              Dyspnea              Anxiety              Nausea              Depression               Anorexia              Fatigue              Insomnia              Restlessness               Agitation              CAM / Delirium __ --  ___+   Constipation     __ --  ___+   Diarrhea           __ --  ___+  Bowel Management Plan (BMP): Yes    Pain Assessment: yes    Performance Status: 10    ECOG Performance Status Grade: 4 - Completely disabled    Physical Exam    Significant Labs: All pertinent labs within the past 24 hours have been reviewed.  CBC:   Recent Labs   Lab 03/24/20  0646   WBC 6.84   HGB 15.9   HCT 47.4   MCV 91        BMP:  Recent Labs   Lab 03/24/20  0646   *   *   K 4.7      CO2 18*   BUN 54*   CREATININE 2.0*   CALCIUM 8.1*   MG 2.6     LFT:  Lab Results   Component Value Date    AST 91 (H) 03/24/2020    ALKPHOS 68 03/24/2020    BILITOT 0.4 03/24/2020     Albumin:   Albumin   Date Value Ref Range Status   03/24/2020 2.3 (L) 3.5 - 5.2 g/dL Final     Protein:   Total Protein   Date Value Ref Range Status   03/24/2020 5.5 (L) 6.0 - 8.4 g/dL Final     Lactic acid:   Lab Results   Component Value Date    LACTATE 1.0 03/21/2020       Significant Imaging: I have reviewed all pertinent imaging results/findings within the past 24 hours.    Advance Care Planning   Advanced Directives::  Living Will: No  LaPOST: No  Do Not Resuscitate Status: No  Medical Power of : Yes.   Decision-Making Capacity: Family answered questions, Patient unable to communicate due to disease severity/cognitive impairment       Living Arrangements: Lives with  family    Recommendations:  Continue medical treatment  Code status: Full code  Continued emotional support.        Tea De Anda, MSN, APRN, NP-C   Palliative Medicine   [facility]  [phone] or [phone]        >50% of 60  min visit spent in chart review, face to face discussion of goals of care with patient, family, symptom assessment, coordination of care and emotional support.

## 2020-03-24 NOTE — CONSULTS
"[facility]  Palliative Medicine  Consult Note    Patient Name: Zhao Fischer  MRN: [id number]  Admission Date: 3/21/2020  Hospital Length of Stay: 1 days  Code Status: Full Code   Attending Provider: Rafaela Romero*  Consulting Provider: Tea De Anda NP  Primary Care Physician: Mannie Russell MD  Principal Problem:Pneumonia due to Severe Acute Respiratory Syndrome Coronavirus 2 (SARS-Cov-2)    Patient information was obtained from relative(s), past medical records and ER records.      Inpatient consult to Palliative Care  Consult performed by: Tea De Anda NP  Consult ordered by: Rafaela Romero MD        Assessment/Plan:     No new Assessment & Plan notes have been filed under this hospital service since the last note was generated.  Service: Palliative Medicine      Thank you for your consult. I will follow-up with patient. Please contact us if you have any additional questions.    Subjective:     HPI:   Patient is a 74-year-old male with no past medical history on file who presents to the ED with the complaint of non productive cough, shortness of breath and fever since last Sunday. Pt states that he was prescribed PO antibiotics by his PCP for the aforementioned complaint, but that he continues to have symptoms that have slowly worsened. He denies any recent sick contacts, hx of immunosuppression. Pt denies any CP/nausea/vomiting/diarrhea/chills/night sweats/abdominal pain.     Palliative medicine spoke with Ms. Gordon (spouse) this pm for emotional support. Discussed patient current clinic status. Patient vent settings increased on today. Wife in denial of patient not doing well. "My PCP saw his chart and he is doing well. He said his oxygen level is doing good and his blood pressure. I know he is going to pull out of this thing. I don't want my children down here because of this virus." Spouse given number to call ICU patient tired and does not want to fight " anymore. Palliative medicine will continue to follow during this admission. Thank you for the consult.     Hospital Course:  No notes on file    Interval History: Continue treatment. Full code. Continued emotional support to family.     Past Medical History:   Diagnosis Date    Diabetes mellitus, type 2     Hyperlipidemia     Hypertension     Peripheral artery disease        Past Surgical History:   Procedure Laterality Date    ANGIOPLASTY      CARDIAC CATHETERIZATION         Review of patient's allergies indicates:   Allergen Reactions    Clindamycin Diarrhea       Medications:  Continuous Infusions:   fentanyl 100 mcg/hr (03/24/20 1400)    propofoL 20 mcg/kg/min (03/24/20 0307)     Scheduled Meds:   amLODIPine  2.5 mg Oral Daily    atorvastatin  40 mg Oral QHS    azithromycin  500 mg Oral Daily    cefTRIAXone (ROCEPHIN) IVPB  1 g Intravenous Q24H    chlorhexidine  15 mL Mouth/Throat BID    clopidogreL  75 mg Oral Daily    dextromethorphan-guaifenesin  mg  2 tablet Oral BID    famotidine (PF)  20 mg Intravenous BID    fenofibrate  145 mg Oral Daily    heparin (porcine)  5,000 Units Subcutaneous Q12H    [START ON 3/25/2020] hydroxychloroquine  200 mg Per OG tube BID    metoprolol succinate  50 mg Oral Daily     PRN Meds:acetaminophen, albuterol, aluminum-magnesium hydroxide-simethicone, benzocaine, benzonatate, Dextrose 10% Bolus, Dextrose 10% Bolus, fentaNYL, glucagon (human recombinant), glucose, glucose, insulin aspart U-100, melatonin, ondansetron, sodium chloride 0.9%    Family History     Problem Relation (Age of Onset)    Hypertension Father    No Known Problems Mother    Stroke Father        Tobacco Use    Smoking status: Former Smoker   Substance and Sexual Activity    Alcohol use: Yes     Comment: social    Drug use: Not on file    Sexual activity: Not on file       Review of Systems  Objective:     Vital Signs (Most Recent):  Temp: 98.5 °F (36.9 °C) (03/24/20 0800)  Pulse:  100 (03/24/20 1515)  Resp: (!) 41 (03/24/20 1515)  BP: (!) 102/58 (03/24/20 1515)  SpO2: (!) 90 % (03/24/20 1515) Vital Signs (24h Range):  Temp:  [97.3 °F (36.3 °C)-98.5 °F (36.9 °C)] 98.5 °F (36.9 °C)  Pulse:  [] 100  Resp:  [11-45] 41  SpO2:  [90 %-98 %] 90 %  BP: ()/(56-81) 102/58     Weight: 73.9 kg (162 lb 14.7 oz)  Body mass index is 24.77 kg/m².    Review of Symptoms  Symptom Assessment (ESAS 0-10 scale)   ESAS 0 1 2 3 4 5 6 7 8 9 10   Pain              Dyspnea              Anxiety              Nausea              Depression               Anorexia              Fatigue              Insomnia              Restlessness               Agitation              CAM / Delirium __ --  ___+   Constipation     __ --  ___+   Diarrhea           __ --  ___+  Bowel Management Plan (BMP): Yes    Pain Assessment: yes    Performance Status: 10    ECOG Performance Status Grade: 4 - Completely disabled    Physical Exam    Significant Labs: All pertinent labs within the past 24 hours have been reviewed.  CBC:   Recent Labs   Lab 03/24/20  0646   WBC 6.84   HGB 15.9   HCT 47.4   MCV 91        BMP:  Recent Labs   Lab 03/24/20  0646   *   *   K 4.7      CO2 18*   BUN 54*   CREATININE 2.0*   CALCIUM 8.1*   MG 2.6     LFT:  Lab Results   Component Value Date    AST 91 (H) 03/24/2020    ALKPHOS 68 03/24/2020    BILITOT 0.4 03/24/2020     Albumin:   Albumin   Date Value Ref Range Status   03/24/2020 2.3 (L) 3.5 - 5.2 g/dL Final     Protein:   Total Protein   Date Value Ref Range Status   03/24/2020 5.5 (L) 6.0 - 8.4 g/dL Final     Lactic acid:   Lab Results   Component Value Date    LACTATE 1.0 03/21/2020       Significant Imaging: I have reviewed all pertinent imaging results/findings within the past 24 hours.    Advance Care Planning   Advanced Directives::  Living Will: No  LaPOST: No  Do Not Resuscitate Status: No  Medical Power of : Yes.   Decision-Making Capacity: Family answered  questions, Patient unable to communicate due to disease severity/cognitive impairment       Living Arrangements: Lives with family    Recommendations:  Continue medical treatment  Code status: Full code  Continued emotional support.        Tea De Anda, MSN, APRN, NP-C   Palliative Medicine   [facility]  [phone] or [phone]        >50% of 60  min visit spent in chart review, face to face discussion of goals of care with patient, family, symptom assessment, coordination of care and emotional support.

## 2020-03-24 NOTE — CARE UPDATE
This note also relates to the following rows which could not be included:  Oxygen Concentration (%) - Cannot attach notes to unvalidated device data  SpO2 - Cannot attach notes to unvalidated device data  Pulse - Cannot attach notes to unvalidated device data  Resp - Cannot attach notes to unvalidated device data  BP - Cannot attach notes to unvalidated device data  Ventilation Type - Cannot attach notes to unvalidated device data  Vent Mode - Cannot attach notes to unvalidated device data  Set Rate - Cannot attach notes to unvalidated device data  Vt Set - Cannot attach notes to unvalidated device data  PEEP/CPAP - Cannot attach notes to unvalidated device data  Pressure Support - Cannot attach notes to unvalidated device data  Waveform - Cannot attach notes to unvalidated device data  Peak Flow - Cannot attach notes to unvalidated device data  Set Inspiratory Pressure - Cannot attach notes to unvalidated device data  Insp Time - Cannot attach notes to unvalidated device data  Plateau Set/Insp. Hold (sec) - Cannot attach notes to unvalidated device data  Insp Rise Time  - Cannot attach notes to unvalidated device data  Trigger Sensitivity Flow/I-Trigger - Cannot attach notes to unvalidated device data  P High - Cannot attach notes to unvalidated device data  P Low - Cannot attach notes to unvalidated device data  T High - Cannot attach notes to unvalidated device data  T Low - Cannot attach notes to unvalidated device data  Resp Rate Total - Cannot attach notes to unvalidated device data  Peak Airway Pressure - Cannot attach notes to unvalidated device data  Mean Airway Pressure - Cannot attach notes to unvalidated device data  Plateau Pressure - Cannot attach notes to unvalidated device data  Exhaled Vt - Cannot attach notes to unvalidated device data  Total Ve - Cannot attach notes to unvalidated device data  Spont Ve - Cannot attach notes to unvalidated device data  I:E Ratio Measured - Cannot attach notes to  unvalidated device data  Ve High Alarm - Cannot attach notes to unvalidated device data  Resp Rate High Alarm - Cannot attach notes to unvalidated device data  Press High Alarm - Cannot attach notes to unvalidated device data  Apnea Rate - Cannot attach notes to unvalidated device data  Apnea Volume (mL) - Cannot attach notes to unvalidated device data  Apnea Oxygen Concentration  - Cannot attach notes to unvalidated device data  Apnea Flow Rate (L/min) - Cannot attach notes to unvalidated device data  T Apnea - Cannot attach notes to unvalidated device data    Pt. Received on vent with settings on the flow sheet. Pt. Sleeping in no apparent distress.  Vent check ok, alarms working and are audible. Vent volumes ok.

## 2020-03-24 NOTE — PROGRESS NOTES
"U Infectious Disease Progress Note    Primary Team: Rafaela Romero*  Consultant Attending: Dr. Melly FERNANDEZ  Date of Admit: 3/21/2020    Reason for Consult     COVID 19 rule out & hydroxychlorquine recommendations     History of Present Illness     Patient presented to [facility] ED 3/21/20 with nonproductive cough, low grade fever/chills and SOB x 1 week. Patient denies recent travel. His wife has been ill with similar symptoms. Patient called his PCP due to concern for COVID 19. He did not meet testing criteria at the time. He was given prescription for po antibiotics. Patient reports no improvement despite antibiotics and OTC decongestants.      Today: History not preformed due to covid testing    Review of Systems (positives in bold):  ROS  Defer to primary team ROS due to COVID19 testing    Allergies:  Review of patient's allergies indicates:   Allergen Reactions    Clindamycin Diarrhea       Medications:   In-Hospital Scheduled Medications:   amLODIPine  2.5 mg Oral Daily    atorvastatin  40 mg Oral QHS    azithromycin  500 mg Oral Daily    cefTRIAXone (ROCEPHIN) IVPB  1 g Intravenous Q24H    chlorhexidine  15 mL Mouth/Throat BID    clopidogreL  75 mg Oral Daily    dextromethorphan-guaifenesin  mg  2 tablet Oral BID    famotidine (PF)  20 mg Intravenous BID    fenofibrate  145 mg Oral Daily    heparin (porcine)  5,000 Units Subcutaneous Q12H    hydroxychloroquine  400 mg Oral Daily    metoprolol succinate  50 mg Oral Daily         Past Medical History:  Past Medical History:   Diagnosis Date    Diabetes mellitus, type 2     Hyperlipidemia     Hypertension     Peripheral artery disease          Objective   Last 24 Hour Vital Signs:  BP  Min: 68/48  Max: 195/125  Temp  Av.7 °F (36.5 °C)  Min: 97.1 °F (36.2 °C)  Max: 98.5 °F (36.9 °C)  Pulse  Av.3  Min: 84  Max: 102  Resp  Av.9  Min: 24  Max: 60  SpO2  Av.1 %  Min: 88 %  Max: 98 %  Height  Av' 8" (172.7 cm)  " "Min: 5' 8" (172.7 cm)  Max: 5' 8" (172.7 cm)  Weight  Av.9 kg (162 lb 14.7 oz)  Min: 73.9 kg (162 lb 14.7 oz)  Max: 73.9 kg (162 lb 14.7 oz)        Physical Examination:  Physical Exam  Defer to primary team ROS due to COVID19 testing    Laboratory:  CBC:   Lab Results   Component Value Date    WBC 6.84 2020    HGB 15.9 2020     2020    MCV 91 2020    RDW 13.0 2020       Estimated Creatinine Clearance: 31.4 mL/min (A) (based on SCr of 2 mg/dL (H)).        Assessment     Patient presented to [facility] ED 3/21/20 with nonproductive cough, low grade fever/chills and SOB x 1 week. Patient denies recent travel. His wife has been ill with similar symptoms. Patient called his PCP due to concern for COVID 19. He did not meet testing criteria at the time. He was given prescription for po antibiotics. Patient reports no improvement despite antibiotics and OTC decongestants.      Recommendations   Acute Hypoxemic Respiratory Failure Secondary to suspected COVID 19 Rule Out  - Upon presentation, , spo2 88% on room air -> 92% on 4.5L NC, otherwise vitals stable  - Physical exam: normal  - elevated: ESR 26, , troponin 0.046  - WBC 2.78 (absolute lymphocyte count 0.3 )  - without abnormality: flu , lactic acid 1, procalcitonin 0.07, UA  - following blood cultures from 3/21/20 -no growth to date  - Chest x-ray on 3/23/20: Multifocal areas of abnormal attenuation concerning for multifocal pneumonia worse from the prior exam.  - Social: lives in [place], [place]  - Clinical course: afebrile since admission, intubated on 3/23/20, spO2 improving to 96% on 50 Fio2 from 96% on 60 fio2, sedated & off pressors, WBC increasing to 6.8 from 2.78, AST 91, & ALT 56, creatine increase to 2  - QTc 449 from EKG on 3/23/20       - continue hydroxychlorquine 400mg (day 3), azithromycin, & ceftriaxone  - Recommend monitor QTc daily in setting of hydroxychlorquine & azthromycin use  - follow up COVID " 19 PCR from 3/21/20        Thank you for this consult. We will follow.      Tobi Mercado, DO PGY-1  [facility]

## 2020-03-24 NOTE — NURSING
Called ELLIOTT Davidson informed her pt u/o 20 ml for the past 2 hours. U/O yellow brown color with sediment. Recommended IVFs. Orders to consult eICU for recommendations for treatment since pt is COVID-19 positive.    0345: Called eICU for recommendations.

## 2020-03-25 NOTE — ASSESSMENT & PLAN NOTE
Acute Respiratory Disease due to Covid-19 Virus  Acute respiratory failure with hypoxia    Appreciate Infectious Disease and Pulmonology.   COVID 19 positive  Continue hydroxychloroquine, azithromycin & ceftriaxone and monitor QTc  Consult palliative care for support

## 2020-03-25 NOTE — ASSESSMENT & PLAN NOTE
Avoid nephrotoxic agens  Renally dose all meds  Monitor  Consult nephrology  Strict input/output

## 2020-03-25 NOTE — PLAN OF CARE
Patient on  with documented settings.  Alarms are set and functioning with adequate volumes.  AMBU bag and mask at bedside. No respiratory distress indicated at this time. Will continue to monitor.

## 2020-03-25 NOTE — PROGRESS NOTES
[facility] Hospital Medicine  Progress Note    Patient Name: Zhao Fischer  MRN: [id number]  Patient Class: IP- Inpatient   Admission Date: 3/21/2020  Length of Stay: 2 days  Attending Physician: Rafaela Romero*  Primary Care Provider: Manine Russell MD        Subjective:     Principal Problem:Pneumonia due to Severe Acute Respiratory Syndrome Coronavirus 2 (SARS-Cov-2)        HPI:  Zhao Fischer is a 74 year old white man with hypertension, hyperlipidemia, diabetes mellitus type 2, peripheral artery disease, arteriosclerotic cardiovascular disease. He lives in [place], [place]. He has been  since he was 16 years old. His primary care physician is Dr. Mannie Russell.    He contacted Dr. Russell on 3/16/2020 to request testing for COVID-19 due to nonproductive cough, shortness of breath, and fever for the past day, but he could not get tested due to limited outpatient testing. He was prescribed levofloxacin 500 mg daily.    He presented to [facility] Emergency Department on 3/21/2020 with persistent symptoms, including diarrhea, which is common with COVID-19 infection, and poor appetite. He reported that his wife had similar symptoms. He was hypoxic, with oxygen saturation of 88%. Chest X-ray showed interstitial lung opacities. WBC count was low at 2780. BNP, lactate, and procalcitonin were low. CRP was elevated at 154 mg/L. COVID-19 testing has been liberal in the emergency department (criteria of cough alone is enough) so he was tested. He was not given anything other than supplemental oxygen in the emergency department. He was admitted to [facility].     Overview/Hospital Course:  He was put on hydroxychloroquine and azithromycin. His hypoxia worsened overnight between 3/22/2020 and 3/23/2020. Pulmonology was consulted.   3/24 COVID 19 positive and family updated, questions and concerns addressed.consult palliative care    Interval  History: intubated, decrease urinary output,- consult nephrology  Reported hypoglycemic episode overnight and received D10, continue to titrate tube feed to goal  Continue with Hydroxychloroquine, azithromycin, & ceftriaxone and monitor QTc  Patient is COVID positive    Review of Systems   Unable to perform ROS: Intubated     Objective:     Vital Signs (Most Recent):  Temp: (P) 98.4 °F (36.9 °C) (03/25/20 0315)  Pulse: 97 (03/25/20 1020)  Resp: (!) 33 (03/25/20 1020)  BP: 126/76 (03/25/20 1020)  SpO2: (!) 92 % (03/25/20 1020) Vital Signs (24h Range):  Temp:  [98.4 °F (36.9 °C)-98.8 °F (37.1 °C)] (P) 98.4 °F (36.9 °C)  Pulse:  [] 97  Resp:  [18-47] 33  SpO2:  [88 %-96 %] 92 %  BP: ()/(54-81) 126/76     Weight: 73.9 kg (162 lb 14.7 oz)  Body mass index is 24.77 kg/m².    Intake/Output Summary (Last 24 hours) at 3/25/2020 1134  Last data filed at 3/25/2020 0600  Gross per 24 hour   Intake 1849.21 ml   Output 256 ml   Net 1593.21 ml      Physical Exam   Constitutional: He appears well-developed. No distress.   Pulmonary/Chest: Tachypnea noted. No respiratory distress.   intubated   Nursing note and vitals reviewed.      Significant Labs:   ABGs:   Recent Labs   Lab 03/23/20  1255   PH 7.344*   PCO2 37.0   HCO3 20.1*   POCSATURATED 95   BE -6     Blood Culture: No results for input(s): LABBLOO in the last 48 hours.  BMP:   Recent Labs   Lab 03/25/20  0510   GLU 45*   *   K 4.1      CO2 19*   BUN 72*   CREATININE 2.7*   CALCIUM 7.9*   MG 2.6     CBC:   Recent Labs   Lab 03/24/20  0646 03/25/20  0510   WBC 6.84 6.20   HGB 15.9 15.2   HCT 47.4 45.7    195     CMP:   Recent Labs   Lab 03/24/20  0646 03/25/20  0510   * 131*   K 4.7 4.1    102   CO2 18* 19*   * 45*   BUN 54* 72*   CREATININE 2.0* 2.7*   CALCIUM 8.1* 7.9*   PROT 5.5* 5.3*   ALBUMIN 2.3* 2.2*   BILITOT 0.4 0.3   ALKPHOS 68 60   AST 91* 67*   ALT 56* 45*   ANIONGAP 12 10   EGFRNONAA 32* 22*     Lactic Acid: No  results for input(s): LACTATE in the last 48 hours.  TSH: No results for input(s): TSH in the last 4320 hours.  Urine Culture: No results for input(s): LABURIN in the last 48 hours.  Urine Studies: No results for input(s): COLORU, APPEARANCEUA, PHUR, SPECGRAV, PROTEINUA, GLUCUA, KETONESU, BILIRUBINUA, OCCULTUA, NITRITE, UROBILINOGEN, LEUKOCYTESUR, RBCUA, WBCUA, BACTERIA, SQUAMEPITHEL, HYALINECASTS in the last 48 hours.    Invalid input(s): WRIGHTSUR    Significant Imaging: I have reviewed all pertinent imaging results/findings within the past 24 hours.      Assessment/Plan:      * Pneumonia due to Severe Acute Respiratory Syndrome Coronavirus 2 (SARS-Cov-2)  Acute Respiratory Disease due to Covid-19 Virus  Acute respiratory failure with hypoxia    Appreciate Infectious Disease and Pulmonology.   COVID 19 positive  Continue hydroxychloroquine, azithromycin & ceftriaxone and monitor QTc  Consult palliative care for support    Elevated LFTs  monitor      KEYUR (acute kidney injury)  Avoid nephrotoxic agens  Renally dose all meds  Monitor  Consult nephrology  Strict input/output      Acute respiratory failure with hypoxia  See primary problem.    Essential hypertension  ASCVD (arteriosclerotic cardiovascular disease)  Hyperlipidemia   PAD (peripheral artery disease)  Continue home amlodipine 2.5 mg daily, atorvastatin 40 mg daily, clopidrogel 75 mg daily.  Hold Metoprolol succinate   Hold lisinopril due to worsening renal function    Type 2 diabetes mellitus with diabetic peripheral angiopathy without gangrene, without long-term current use of insulin  Hold home glimepiride, empagliflozin. I  nsulin aspart sliding scale.      VTE Risk Mitigation (From admission, onward)         Ordered     heparin (porcine) injection 5,000 Units  Every 12 hours      03/23/20 1357     IP VTE HIGH RISK PATIENT  Once      03/21/20 2227     Place sequential compression device  Until discontinued      03/21/20 2227                Critical care  time spent on the evaluation and treatment of severe organ dysfunction, review of pertinent labs and imaging studies, discussions with consulting providers and discussions with patient/family: 45 minutes.      Rafaela Romero MD  Department of Hospital Medicine   [facility]

## 2020-03-25 NOTE — PROGRESS NOTES
U Infectious Disease Progress Note    Primary Team: Rafaela Romero*  Consultant Attending: Dr. Melly FERNANDEZ  Date of Admit: 3/21/2020    Reason for Consult     COVID 19 rule out & hydroxychlorquine recommendations     History of Present Illness     Patient presented to [facility] ED 3/21/20 with nonproductive cough, low grade fever/chills and SOB x 1 week. Patient denies recent travel. His wife has been ill with similar symptoms. Patient called his PCP due to concern for COVID 19. He did not meet testing criteria at the time. He was given prescription for po antibiotics. Patient reports no improvement despite antibiotics and OTC decongestants.      Today: History not preformed due to covid testing    Review of Systems (positives in bold):  ROS  Defer to primary team ROS due to COVID19 testing    Allergies:  Review of patient's allergies indicates:   Allergen Reactions    Clindamycin Diarrhea       Medications:   In-Hospital Scheduled Medications:   amLODIPine  2.5 mg Oral Daily    atorvastatin  40 mg Oral QHS    azithromycin  500 mg Oral Daily    cefTRIAXone (ROCEPHIN) IVPB  1 g Intravenous Q24H    chlorhexidine  15 mL Mouth/Throat BID    clopidogreL  75 mg Oral Daily    dextromethorphan-guaifenesin  mg  2 tablet Oral BID    famotidine (PF)  20 mg Intravenous BID    fenofibrate  145 mg Oral Daily    heparin (porcine)  5,000 Units Subcutaneous Q12H    hydroxychloroquine  200 mg Per OG tube BID    metoprolol succinate  50 mg Oral Daily         Past Medical History:  Past Medical History:   Diagnosis Date    Diabetes mellitus, type 2     Hyperlipidemia     Hypertension     Peripheral artery disease          Objective   Last 24 Hour Vital Signs:  BP  Min: 77/54  Max: 144/81  Temp  Av.6 °F (37 °C)  Min: 98.4 °F (36.9 °C)  Max: 98.8 °F (37.1 °C)  Pulse  Av.1  Min: 82  Max: 106  Resp  Av.6  Min: 11  Max: 47  SpO2  Av %  Min: 88 %  Max: 97 %        Physical  Examination:  Physical Exam  Defer to primary team ROS due to COVID19 testing    Laboratory:  CBC:   Lab Results   Component Value Date    WBC 6.20 03/25/2020    HGB 15.2 03/25/2020     03/25/2020    MCV 92 03/25/2020    RDW 13.0 03/25/2020       Estimated Creatinine Clearance: 23.2 mL/min (A) (based on SCr of 2.7 mg/dL (H)).        Assessment     Patient presented to [facility] ED 3/21/20 with nonproductive cough, low grade fever/chills and SOB x 1 week. Patient denies recent travel. His wife has been ill with similar symptoms. Patient called his PCP due to concern for COVID 19. He did not meet testing criteria at the time. He was given prescription for po antibiotics. Patient reports no improvement despite antibiotics and OTC decongestants.      Recommendations   Acute Hypoxemic Respiratory Failure Secondary & Pneumonia due to COVID19   - positive COVID 19 PCR from 3/21/20  - Upon presentation, , spo2 88% on room air -> 92% on 4.5L NC, otherwise vitals stable  - Physical exam: normal  - elevated: ESR 26, , troponin 0.046  - WBC 2.78 (absolute lymphocyte count 0.3 )  - without abnormality: flu , lactic acid 1, procalcitonin 0.07, UA  - following blood cultures from 3/21/20 -no growth to date  - Chest x-ray on 3/23/20: Multifocal areas of abnormal attenuation concerning for multifocal pneumonia worse from the prior exam.  - Social: lives in [place], [place]  - Clinical course: 3/24/20: intubated on 3/23/20, spO2 improving to 96% on 50 Fio2 from 96% on 60 fio2,-> 3/25/20: sedated & off pressors, Hypoglycemic episodes x 2 (40s) on no insulin, started on dextrose drip afebrile since admission, sepsis or adrenal insufficney?? WBC stable at 6, creatine increasing to 2.7 (1.2 baseline), transaminits improving, spo2 worsening to 90% on fio65 from  96% on 50 Fio2,  - QTc 449 from EKG on 3/23/20       - continue hydroxychlorquine 400mg (day 4), azithromycin, & ceftriaxone  - Recommend monitor QTc daily in  setting of hydroxychlorquine & azthromycin use          Thank you for this consult. We will follow.      Tobi Mercado, DO PGY-1  [facility]

## 2020-03-25 NOTE — EICU
Hypoglycemic episodes x 2.   Has 3 peripheral line.  On sedation.    Plan:  D10 at 20 ml/hr. Risk for phlebitis. benefit outweighs risk.     5:%% AM  BG 46 again.  CBC fine. CMP pending. Last AST/ALT was OK.    Increased D10 to 40 ml/hr.   - increase TF from 20 to 40/hr. Watch for residuals.

## 2020-03-25 NOTE — SUBJECTIVE & OBJECTIVE
Interval History: intubated, decrease urinary output,- consult nephrology  Reported hypoglycemic episode overnight and received D10, continue to titrate tube feed to goal  Continue with Hydroxychloroquine, azithromycin, & ceftriaxone and monitor QTc  Patient is COVID positive    Review of Systems   Unable to perform ROS: Intubated     Objective:     Vital Signs (Most Recent):  Temp: (P) 98.4 °F (36.9 °C) (03/25/20 0315)  Pulse: 97 (03/25/20 1020)  Resp: (!) 33 (03/25/20 1020)  BP: 126/76 (03/25/20 1020)  SpO2: (!) 92 % (03/25/20 1020) Vital Signs (24h Range):  Temp:  [98.4 °F (36.9 °C)-98.8 °F (37.1 °C)] (P) 98.4 °F (36.9 °C)  Pulse:  [] 97  Resp:  [18-47] 33  SpO2:  [88 %-96 %] 92 %  BP: ()/(54-81) 126/76     Weight: 73.9 kg (162 lb 14.7 oz)  Body mass index is 24.77 kg/m².    Intake/Output Summary (Last 24 hours) at 3/25/2020 1134  Last data filed at 3/25/2020 0600  Gross per 24 hour   Intake 1849.21 ml   Output 256 ml   Net 1593.21 ml      Physical Exam   Constitutional: He appears well-developed. No distress.   Pulmonary/Chest: Tachypnea noted. No respiratory distress.   intubated   Nursing note and vitals reviewed.      Significant Labs:   ABGs:   Recent Labs   Lab 03/23/20  1255   PH 7.344*   PCO2 37.0   HCO3 20.1*   POCSATURATED 95   BE -6     Blood Culture: No results for input(s): LABBLOO in the last 48 hours.  BMP:   Recent Labs   Lab 03/25/20  0510   GLU 45*   *   K 4.1      CO2 19*   BUN 72*   CREATININE 2.7*   CALCIUM 7.9*   MG 2.6     CBC:   Recent Labs   Lab 03/24/20  0646 03/25/20  0510   WBC 6.84 6.20   HGB 15.9 15.2   HCT 47.4 45.7    195     CMP:   Recent Labs   Lab 03/24/20  0646 03/25/20  0510   * 131*   K 4.7 4.1    102   CO2 18* 19*   * 45*   BUN 54* 72*   CREATININE 2.0* 2.7*   CALCIUM 8.1* 7.9*   PROT 5.5* 5.3*   ALBUMIN 2.3* 2.2*   BILITOT 0.4 0.3   ALKPHOS 68 60   AST 91* 67*   ALT 56* 45*   ANIONGAP 12 10   EGFRNONAA 32* 22*     Lactic  Acid: No results for input(s): LACTATE in the last 48 hours.  TSH: No results for input(s): TSH in the last 4320 hours.  Urine Culture: No results for input(s): LABURIN in the last 48 hours.  Urine Studies: No results for input(s): COLORU, APPEARANCEUA, PHUR, SPECGRAV, PROTEINUA, GLUCUA, KETONESU, BILIRUBINUA, OCCULTUA, NITRITE, UROBILINOGEN, LEUKOCYTESUR, RBCUA, WBCUA, BACTERIA, SQUAMEPITHEL, HYALINECASTS in the last 48 hours.    Invalid input(s): WRIGHTSUR    Significant Imaging: I have reviewed all pertinent imaging results/findings within the past 24 hours.

## 2020-03-25 NOTE — PLAN OF CARE
"        Dx: Pneumonia due to Severe Acute Respiratory Syndrome Coronavirus 2 (SARS-Cov-2)    Shift Events: BG less than 40 x's 2. D10W boluses given; D10W infusing @ 40cc/hr. TF increased to 40cc/hr. Will continue to monitor.    Goals of Care: Wean vent settings, BG greater than 70.     Neuro: Sedated, Arouses to Voice, Follows Commands and Moves All Extremities    Vital Signs: /62   Pulse 89   Temp (P) 98.4 °F (36.9 °C) (Oral)   Resp (!) 21   Ht 5' 8" (1.727 m)   Wt 73.9 kg (162 lb 14.7 oz)   SpO2 95%   BMI 24.77 kg/m²     Respiratory: Ventilator    Diet: Tube Feeds    Gtts: Propfol and Fentanyl and D10W     Urine Output: Urinary Catheter 5-12 cc/hour    Skin: intact      Mikhail Wilson.   .  "

## 2020-03-25 NOTE — PROGRESS NOTES
Patient seen and examined by me. I agree with the trainee's separate note except as modified.     Overnight, had some hypoglycemic episodes and was placed on a D10 gtt. Required more FiO2.     I/O: +1.6L     Afebrile, HR 90s, 89% on 65% and 10 PEEP.    Creatinine 2 to 2.7. Bicarb 19. Glucose 45    On my exam: sedated but does move spontaneously    Impression: COVID-19 with ARDS and worsening renal function.     Recommendations:   -stop hydroxychloroquine as this has been associated with hypoglycemia. There is no other clear explanation for his persistently low blood sugar  -RASS at goal with sedation  -stop norvasc as his BP is on the low end  -on D10 and TFs and increase accu checks to q2 until BS has stabilized  -stop ceftriaxone  -no vent changes at this point  -closely watching for dialysis need    Critical care time (35min) spent personally by me on the following activities: development of treatment plan with patient or surrogate and bedside caregivers, discussions with consultants, evaluation of patient's response to treatment, examination of patient, ordering and performing treatments and interventions, ordering and review of laboratory studies, ordering and review of radiographic studies, pulse oximetry, re-evaluation of patient's condition. This critical care time did not overlap with that of any other provider or involve time for any procedures.

## 2020-03-26 PROBLEM — E16.2 HYPOGLYCEMIA: Status: ACTIVE | Noted: 2020-01-01

## 2020-03-26 NOTE — PLAN OF CARE
Notified Dr. Khan, patient's . Dr ordered to give insulin aspart 10 U and decrease tube feeds from 50 ml/hr to 25 ml/hr.

## 2020-03-26 NOTE — PROGRESS NOTES
Patient seen and examined by me. I agree with the trainee's separate note except as modified.     No major events overnight.     I/O: +2.9L     Tmax 101.1, MAP 60-70s, 93% on 65% and 10 PEEP.     Creatinine 2.7 to 3.2    On my exam: sedated but awake on the vent    Impression: COVID-19 +, complicated by KEYUR    Recommendations:   -decrease accu check frequency, D10 is off  -day 5 of azithromycin  -reduce beta blocker dose  -lasix now. Will likely need RRT to remove volume    Critical care time (30min) spent personally by me on the following activities: development of treatment plan with patient or surrogate and bedside caregivers, discussions with consultants, evaluation of patient's response to treatment, examination of patient, ordering and performing treatments and interventions, ordering and review of laboratory studies, ordering and review of radiographic studies, pulse oximetry, re-evaluation of patient's condition. This critical care time did not overlap with that of any other provider or involve time for any procedures.

## 2020-03-26 NOTE — PLAN OF CARE
Patient on ventilator with documented settings.  Alarms are set and functioning with adequate volumes.  AMBU bag and mask at bedside.

## 2020-03-26 NOTE — PROGRESS NOTES
Pharmacist Renal Dose Adjustment Note    Zhao Fischer is a 74 y.o. male being treated with the medication famotidine    Patient Data:    Vital Signs (Most Recent):  Temp: 98 °F (36.7 °C) (03/26/20 1200)  Pulse: (!) 111 (03/26/20 1230)  Resp: 20 (03/26/20 1230)  BP: 118/67 (03/26/20 1230)  SpO2: (!) 93 % (03/26/20 1230)   Vital Signs (72h Range):  Temp:  [96.2 °F (35.7 °C)-101.1 °F (38.4 °C)]   Pulse:  []   Resp:  [11-47]   BP: ()/()   SpO2:  [87 %-98 %]      Recent Labs   Lab 03/24/20  0646 03/25/20  0510 03/26/20  0541   CREATININE 2.0* 2.7* 3.2*     Serum creatinine: 3.2 mg/dL (H) 03/26/20 0541  Estimated creatinine clearance: 19.6 mL/min (A)    Medication:famotidine 20mg BID will be changed to medication:famotidine 20mg daily    Pharmacist's Name: Saira Thapa  Pharmacist's Extension: [phone]

## 2020-03-26 NOTE — PROGRESS NOTES
"[facility]  Adult Nutrition  Progress Note    SUMMARY       Recommendations    Recommendation:   1. Consider changing TF to Peptamen Intense VHP at 10ml/hr and increase as tolerated to goal rate of 60ml/hr to provide 1440kcal, 132g protein, & 1209ml free water.   2. Will follow to assess nutrition status.    Goals:  1. Pt will tolerate TF.   2. TF will meet at least 85% estimated kcal/protein needs  Nutrition Goal Status: new    Reason for Assessment  Reason For Assessment: new tube feeding  Diagnosis: (pneumonia/COVID)  Relevant Medical History: HTN, DM, HLD, PAD, angioplasty, cardiac catherization  General Information Comments: Pt intubated on vent. Pt receiving TF of Isosource 1.5 at 40ml/hr. Noted COVID +.  Nutrition Discharge Planning: d/c needs to be determined    Nutrition Risk Screen  Nutrition Risk Screen: tube feeding or parenteral nutrition    Nutrition/Diet History  Food Preferences: unable to assess  Spiritual, Cultural Beliefs, Baptism Practices, Values that Affect Care: yes  Factors Affecting Nutritional Intake: NPO, on mechanical ventilation    Anthropometrics  Temp: 99.4 °F (37.4 °C)  Height Method: Stated  Height: 5' 8" (172.7 cm)  Height (inches): 68 in  Weight Method: Bed Scale  Weight: 73.9 kg (162 lb 14.7 oz)  Weight (lb): 162.92 lb  Ideal Body Weight (IBW), Male: 154 lb  % Ideal Body Weight, Male (lb): 105.79 %  BMI (Calculated): 24.8  BMI Grade: 18.5-24.9 - normal     Lab/Procedures/Meds  Pertinent Labs Reviewed: reviewed  Pertinent Labs Comments: Na 131L, BUN 72H, Crea 2.7H, Glu 45L, Ca 7.9L, Alb 2.2L  Pertinent Medications Reviewed: reviewed  Pertinent Medications Comments: heparin, 10%dex at 40ml/hr, fentanyl, propofol    Estimated/Assessed Needs  Weight Used For Calorie Calculations: 73.9 kg (162 lb 14.7 oz)  Energy Calorie Requirements (kcal): 1707  Energy Need Method: [facility]  Protein Requirements: 96g (1.3g.kg)  Weight Used For Protein Calculations: 73.9 kg (162 " lb 14.7 oz)  Estimated Fluid Requirement Method: RDA Method  RDA Method (mL): 1707  CHO Requirement: 200g    Nutrition Prescription Ordered  Current Diet Order: NPO  Current Nutrition Support Formula Ordered: Isosource 1.5  Current Nutrition Support Rate Ordered: 40 (ml)  Current Nutrition Support Frequency Ordered: ml/hr    Evaluation of Received Nutrient/Fluid Intake  Enteral Calories (kcal): 1440  Enteral Protein (gm): 65  Enteral (Free Water) Fluid (mL): 747  Lipid Calories (kcals): 176 kcals  Other Calories (kcal): 326  Total Calories (kcal): 1942  % Kcal Needs: 113  % Protein Needs: 67  I/O: 1949/356  Energy Calories Required: meeting needs  Protein Required: not meeting needs  Fluid Required: meeting needs  Comments: LBM 3/22  % Intake of Estimated Energy Needs: 75 - 100 %  % Meal Intake: NPO    Nutrition Risk  Level of Risk/Frequency of Follow-up: (2xweekly)     Assessment and Plan  Acute Respiratory Distress Syndrome (ARDS) due to Severe Acute Respiratory Syndrome Coronavirus 2 (SARS-Cov-2)  Contributing Nutrition Diagnosis  Inadequate energy intake    Related to (etiology):   intubation    Signs and Symptoms (as evidenced by):   NPO    Interventions:  Enteral nutrition    Nutrition Diagnosis Status:   New      Monitor and Evaluation  Food and Nutrient Intake: energy intake, enteral nutrition intake  Food and Nutrient Adminstration: enteral and parenteral nutrition administration  Physical Activity and Function: nutrition-related ADLs and IADLs  Anthropometric Measurements: weight  Biochemical Data, Medical Tests and Procedures: electrolyte and renal panel  Nutrition-Focused Physical Findings: overall appearance     Malnutrition Assessment  NFPE not performed, patient has been screened for possible COVID-19 and has been placed on airborne and contact precautions. Patient is noted as being positive for COVID-19.      Nutrition Follow-Up  RD Follow-up?: Yes

## 2020-03-26 NOTE — PROGRESS NOTES
[facility]  Critical Care - Medicine  Consult Note    Patient Name: Zhao Fischer  MRN: [id number]  Admission Date: 3/21/2020  Hospital Length of Stay: 3 days  Code Status: Full Code  Attending Physician: Rafaela Romero*  Primary Care Provider: Mannie Russell MD   Principal Problem: Pneumonia due to Severe Acute Respiratory Syndrome Coronavirus 2 (SARS-Cov-2)    Consults  Subjective:     Interval History:  Overnight the patient remains sedated.  He had a samll improvement in his oxygen requirements and is now on 40% and 8 of PEEP saturating in the high 80s low 90s.  He remains 3.7L positive since admission and is becoming progressively oliguric.  He is having a lasix dose today.  Nephrology on board.  Hypoglycemia is improving and d 10 stopped.  Still tolerating tube feeds.    Past Medical History:   Diagnosis Date    Diabetes mellitus, type 2     Hyperlipidemia     Hypertension     Peripheral artery disease        Past Surgical History:   Procedure Laterality Date    ANGIOPLASTY      CARDIAC CATHETERIZATION         Review of patient's allergies indicates:   Allergen Reactions    Clindamycin Diarrhea       Family History     Problem Relation (Age of Onset)    Hypertension Father    No Known Problems Mother    Stroke Father        Tobacco Use    Smoking status: Former Smoker   Substance and Sexual Activity    Alcohol use: Yes     Comment: social    Drug use: Not on file    Sexual activity: Not on file      Review of Systems  Objective:     Vital Signs (Most Recent):  Temp: 98 °F (36.7 °C) (03/26/20 1200)  Pulse: (!) 111 (03/26/20 1230)  Resp: 20 (03/26/20 1230)  BP: 118/67 (03/26/20 1230)  SpO2: (!) 93 % (03/26/20 1230) Vital Signs (24h Range):  Temp:  [97.5 °F (36.4 °C)-101.1 °F (38.4 °C)] 98 °F (36.7 °C)  Pulse:  [] 111  Resp:  [17-33] 20  SpO2:  [87 %-98 %] 93 %  BP: ()/(50-70) 118/67     Weight: 73.9 kg (162 lb 14.7 oz)  Body mass index is 24.77  kg/m².      Intake/Output Summary (Last 24 hours) at 3/26/2020 1509  Last data filed at 3/26/2020 1200  Gross per 24 hour   Intake 2890.4 ml   Output 280 ml   Net 2610.4 ml       Physical Exam   Constitutional: He is oriented to person, place, and time. He appears well-developed and well-nourished. No distress.   HENT:   Head: Normocephalic and atraumatic.   Mouth/Throat: No oropharyngeal exudate.   Eyes: Pupils are equal, round, and reactive to light. No scleral icterus.   Neck: Normal range of motion. Neck supple.   Cardiovascular: Normal rate and regular rhythm. Exam reveals no gallop and no friction rub.   No murmur heard.  Pulmonary/Chest: No respiratory distress. He has no wheezes. He has rales. He exhibits no tenderness.   diffuse crackles present bilaterally   Abdominal: Soft. Bowel sounds are normal. He exhibits no distension. There is no tenderness.   Musculoskeletal: Normal range of motion. He exhibits no edema.   Neurological: He is alert and oriented to person, place, and time. No cranial nerve deficit.   Skin: Skin is warm and dry. He is not diaphoretic.   Psychiatric: He has a normal mood and affect. His behavior is normal. Judgment and thought content normal.       Vents:  Vent Mode: A/C (03/26/20 0809)  Set Rate: 18 BPM (03/26/20 0809)  Vt Set: 420 mL (03/26/20 0809)  Pressure Support: 0 cmH20 (03/26/20 0809)  PEEP/CPAP: 8 cmH20 (03/26/20 0809)  Oxygen Concentration (%): 40 (03/26/20 1230)  Peak Airway Pressure: 21 cmH2O (03/26/20 0809)  Plateau Pressure: 0 cmH20 (03/26/20 0809)  Total Ve: 8.41 mL (03/26/20 0809)  F/VT Ratio<105 (RSBI): (!) 41.85 (03/26/20 0809)    Lines/Drains/Airways     Drain                 NG/OG Tube 03/23/20 1157 orogastric Center mouth 3 days         Urethral Catheter 03/23/20 1206 3 days          Airway                 Airway - Non-Surgical 03/23/20 1148 Endotracheal Tube 3 days          Peripheral Intravenous Line                 Peripheral IV - Single Lumen 03/23/20 1155  20 G Right Hand 3 days         Peripheral IV - Single Lumen 03/23/20 1201 18 G Left Forearm 3 days         Peripheral IV - Single Lumen 03/24/20 1600 18 G Posterior;Right Forearm 1 day                Significant Labs:    CBC/Anemia Profile:  Recent Labs   Lab 03/25/20  0510 03/26/20  0542   WBC 6.20 13.34*   HGB 15.2 14.4   HCT 45.7 44.0    188   MCV 92 94   RDW 13.0 13.3        Chemistries:  Recent Labs   Lab 03/25/20  0510 03/26/20  0541   * 127*   K 4.1 4.3    97   CO2 19* 18*   BUN 72* 78*   CREATININE 2.7* 3.2*   CALCIUM 7.9* 7.6*   ALBUMIN 2.2* 1.9*   PROT 5.3* 5.1*   BILITOT 0.3 0.9   ALKPHOS 60 109   ALT 45* 50*   AST 67* 57*   MG 2.6 2.5   PHOS 4.4 4.6*       All pertinent labs within the past 24 hours have been reviewed.    Significant Imaging: I have reviewed all pertinent imaging results/findings within the past 24 hours.    Assessment/Plan:     Active Diagnoses:    Diagnosis Date Noted POA    PRINCIPAL PROBLEM:  Pneumonia due to Severe Acute Respiratory Syndrome Coronavirus 2 (SARS-Cov-2) [J12.89, B97.29] 03/22/2020 Yes    Hypoglycemia [E16.2] 03/26/2020 Yes    Goals of care, counseling/discussion [Z71.89] 03/24/2020 Not Applicable    Counseling regarding advance care planning and goals of care [Z71.89] 03/24/2020 Not Applicable    Palliative care encounter [Z51.5] 03/24/2020 Not Applicable    Acute Respiratory Disease due to Covid-19 Virus [J06.9, B97.29] 03/24/2020 Yes    KEYUR (acute kidney injury) [N17.9] 03/24/2020 No    Elevated LFTs [R94.5] 03/24/2020 Yes    Cough [R05] 03/23/2020 Yes    Acute Respiratory Distress Syndrome (ARDS) due to Severe Acute Respiratory Syndrome Coronavirus 2 (SARS-Cov-2) [J80, B97.29] 03/22/2020 Yes    Acute respiratory failure with hypoxia [J96.01] 03/21/2020 Yes    Essential hypertension [I10] 01/09/2020 Yes     Chronic    Hyperlipidemia, mixed [E78.2] 01/09/2020 Yes     Chronic    PAD (peripheral artery disease) [I73.9] 01/09/2020 Yes      Chronic    Type 2 diabetes mellitus with diabetic peripheral angiopathy without gangrene, without long-term current use of insulin [E11.51] 01/09/2020 Yes     Chronic    ASCVD (arteriosclerotic cardiovascular disease) [I25.10] 01/09/2020 Yes     Chronic      Problems Resolved During this Admission:       CNS  # need for sedation  - continue fentanyl and propofol gtt  - daily sat/sbt as indicated    PULM  # COVID 19 rule out  # bilateral pneumonia  # hypoxemic respiratory failure  # ARDS  - continue low tidal volume ventilation  - continue azithromycin x 5 days.  Plaquenil d/c due to hypoglycemia  - titrate vent according to the ARDSnet protocol to keep sats >90%  - ensure net negative fluid balance as possible.  Giving 60mg IV lasix today.  Will redose if inadequate output.  May unfortunately require HD if this trajectory continues.   - previously on ceftriaxone for CAP coverage, now discontinued.    CARDS  # HTN  # HLD  # PAD  # NSTEMI  - hold amlodipine, lisinopril.  Resume if hypertension occurs.  Metoprolol halved today.  - continue statin, plavix, fenofibrate  - troponins down-trending now.    GI/FEN  # need for tube feeds  - will start tube feeds today.    RENAL  # KEYUR  # metabolic acidosis  # hyperphosphatemia  - daily labs to monitor for renal function.  Nephrology on board  - attempting lasix challenge today.  60mg IV given x 1.  Will follow up and redose as appropriate.  May need HD if this trajectory continues.    ID  # COVID 19 rule out  - continue therapy as above.    HEME  # no acute issues at this time.    Endocrine  # hypoglycemia  - improving  - continue tube feeds.  - d10 off  - ISS started        Thank you for your consult. I will follow-up with patient. Please contact us if you have any additional questions.     Nikolai Guevara MD  Critical Care - Medicine  [facility]

## 2020-03-26 NOTE — CONSULTS
[facility] Consult Note    Date of Admit: 3/21/2020    Chief Complaint/Reason for Consult     Cough (pt c/o dry cough, denies any sick contacts ); Shortness of Breath (reports sob with cough, denies sob with ambulation); and Fever (pt reports fever that started last sunday relieved with tylenol at home)   [facility] is consulted for KEYUR.     Subjective:      History of Present Illness:  Zhao Fischer is a 74 y.o.  who  has a past medical history of Diabetes mellitus, type 2, Hyperlipidemia, Hypertension, and Peripheral artery disease.. The patient presented to [facility] on 3/21/2020 with a primary complaint of Cough (pt c/o dry cough, denies any sick contacts ); Shortness of Breath (reports sob with cough, denies sob with ambulation); and Fever (pt reports fever that started last sunday relieved with tylenol at home)  Per review, patient presented to PCP on 03/16/2020 with complaints of cough, fever and SOB and requested for COVID testing, which wasn't performed. Patient presented to [facility] ED with same complaints of 03/21/2020 and was tested positive. Patient developed respiratory failure, transferred to ICU and intubated.     Past Medical History:  Past Medical History:   Diagnosis Date    Diabetes mellitus, type 2     Hyperlipidemia     Hypertension     Peripheral artery disease        Past Surgical History:  Past Surgical History:   Procedure Laterality Date    ANGIOPLASTY      CARDIAC CATHETERIZATION         Allergies:  Review of patient's allergies indicates:   Allergen Reactions    Clindamycin Diarrhea       Home Medications:  Prior to Admission medications    Medication Sig Start Date End Date Taking? Authorizing Provider   amLODIPine (NORVASC) 2.5 MG tablet Take 1 tablet (2.5 mg total) by mouth once daily. 3/9/20  Yes Mannie Russell MD   atorvastatin (LIPITOR) 40 MG tablet TK 1 T PO HS 5/26/19  Yes Historical Provider, MD   clopidogrel (PLAVIX) 75 mg tablet TK 1 T PO D  "6/15/19  Yes Historical Provider, MD   empagliflozin (JARDIANCE) 10 mg Tab Take 10 mg by mouth once daily. 20  Yes Mannie Russell MD   glimepiride (AMARYL) 2 MG tablet TK 1 T PO QD WITH FIRST MEAL 19  Yes Mannie Russell MD   levoFLOXacin (LEVAQUIN) 500 MG tablet Take 1 tablet (500 mg total) by mouth once daily. 3/17/20  Yes Mannie Russell MD   lisinopril (PRINIVIL,ZESTRIL) 40 MG tablet TAKE 1 TABLET BY MOUTH ONCE DAILY 10/18/19  Yes Mannie Russell MD   metoprolol succinate (TOPROL-XL) 50 MG 24 hr tablet TK 1 T PO D 19  Yes Mannie Russell MD   ONETOUCH ULTRASOFT LANCETS lancets TEST ONCE D 19  Yes Historical Provider, MD   ONETOUCH VERIO Strp Use to test BS twice daily 8/15/19  Yes Mannie Russell MD   fenofibrate (TRICOR) 145 MG tablet TK 1 T PO D 19   Mannie Russell MD       Family History:  Family History   Problem Relation Age of Onset    No Known Problems Mother     Stroke Father     Hypertension Father        Social History:  Social History     Tobacco Use    Smoking status: Former Smoker   Substance Use Topics    Alcohol use: Yes     Comment: social    Drug use: Not on file       Review of Systems:  Pertinent positives and negatives are listed in HPI.  All other systems are reviewed and are negative.     Objective:   Last 24 Hour Vital Signs:  BP  Min: 79/50  Max: 124/62  Temp  Av.8 °F (37.1 °C)  Min: 97.5 °F (36.4 °C)  Max: 101.1 °F (38.4 °C)  Pulse  Av.4  Min: 91  Max: 111  Resp  Av.4  Min: 17  Max: 37  SpO2  Av %  Min: 87 %  Max: 98 %  Height  Av' 8" (172.7 cm)  Min: 5' 8" (172.7 cm)  Max: 5' 8" (172.7 cm)  Weight  Av.9 kg (162 lb 14.7 oz)  Min: 73.9 kg (162 lb 14.7 oz)  Max: 73.9 kg (162 lb 14.7 oz)  Body mass index is 24.77 kg/m².  I/O last 3 completed shifts:  In: 4037.8 [I.V.:1652.8; NG/GT:1260; IV Piggyback:1125]  Out: 496 [Urine:496]    Physical Examination:  COVID POSITIVE  DEFER EXAM      Laboratory:  Most Recent " Data:  CBC:   Lab Results   Component Value Date    WBC 13.34 (H) 03/26/2020    HGB 14.4 03/26/2020    HCT 44.0 03/26/2020     03/26/2020    MCV 94 03/26/2020    RDW 13.3 03/26/2020     BMP:   Lab Results   Component Value Date     (L) 03/26/2020    K 4.3 03/26/2020    CL 97 03/26/2020    CO2 18 (L) 03/26/2020    BUN 78 (H) 03/26/2020    CREATININE 3.2 (H) 03/26/2020     (H) 03/26/2020    CALCIUM 7.6 (L) 03/26/2020    MG 2.5 03/26/2020    PHOS 4.6 (H) 03/26/2020     LFTs:   Lab Results   Component Value Date    PROT 5.1 (L) 03/26/2020    ALBUMIN 1.9 (L) 03/26/2020    BILITOT 0.9 03/26/2020    AST 57 (H) 03/26/2020    ALKPHOS 109 03/26/2020    ALT 50 (H) 03/26/2020     Coags: No results found for: INR, PROTIME, PTT  Urinalysis:   Lab Results   Component Value Date    COLORU Yellow 03/21/2020    SPECGRAV 1.010 03/21/2020    NITRITE Negative 03/21/2020    KETONESU Negative 03/21/2020    UROBILINOGEN Negative 03/21/2020    WBCUA 1 03/21/2020       Trended Lab Data:  Recent Labs   Lab 03/24/20  0646 03/25/20  0510 03/26/20  0541 03/26/20  0542   WBC 6.84 6.20  --  13.34*   HGB 15.9 15.2  --  14.4   HCT 47.4 45.7  --  44.0    195  --  188   MCV 91 92  --  94   RDW 13.0 13.0  --  13.3   * 131* 127*  --    K 4.7 4.1 4.3  --     102 97  --    CO2 18* 19* 18*  --    BUN 54* 72* 78*  --    CREATININE 2.0* 2.7* 3.2*  --    * 45* 210*  --    PROT 5.5* 5.3* 5.1*  --    ALBUMIN 2.3* 2.2* 1.9*  --    BILITOT 0.4 0.3 0.9  --    AST 91* 67* 57*  --    ALKPHOS 68 60 109  --    ALT 56* 45* 50*  --        Trended Cardiac Data:  Recent Labs   Lab 03/21/20 1955 03/22/20  0614 03/22/20  1130   TROPONINI 0.046* 0.060* 0.041*   BNP 14  --   --      Radiology:  Imaging Results          X-Ray Chest AP Portable (Final result)  Result time 03/21/20 17:58:40    Final result by Anil Bright MD (03/21/20 17:58:40)                 Impression:      Ill-defined interstitial opacities.  No focal  consolidation.      Electronically signed by: Anil Bright MD  Date:    2020  Time:    17:58             Narrative:    EXAMINATION:  XR CHEST AP PORTABLE    CLINICAL HISTORY:  Cough    TECHNIQUE:  Single frontal view of the chest was performed.    COMPARISON:  None    FINDINGS:  Monitoring EKG leads are present.  The trachea is unremarkable.  There are calcifications of the aortic knob.  The cardiomediastinal silhouette is within normal limits.  The hemidiaphragms are unremarkable.  There is no evidence of free air beneath the hemidiaphragms.  There are no pleural effusions.  There is no evidence of a pneumothorax.  There is no evidence of pneumomediastinum.  There are ill-defined interstitial opacities.  The osseous structures are unremarkable.                                   Assessment and Plan:      Zhao Fischer is a 74 y.o.male with      #) Oliguric KEYUR   - Baseline Cr around 1.1, worsened to 3.2 today  - COVID 19 positive  - Urea Na: < 20, FeNA < 0.2, BUN/Cr: >20:1  - Dehydration vs hypotension (ATN) vs post renal  - s/p 500 cc bolus   - Patient is Net +3.7 L.  cc, Lasix 60 IV x Once at 12 noon, will monitor response, may need another dose.  - Will continue to monitor  - Renally dose all medications, avoid contrast and phos based enemas.    #) Hyponatremia  - Na: 127, down from 131  - We recommend getting Serum osmolality, Urine osmolality and urine electrolytes (Na, K, Cr and Cl)    #) Anion Gap metabolic Acidosis  - HCO3: 18, A, Corrected A.25  - 1 amp of NaHCO3 once.     Thank you for allowing us in taking care of this patient. Please call us if you have any questions regarding this patient.       Darline Dhaliwal MD  [facility]  [phone]    If after 5pm please forward any questions to the [facility] Fellow/Attending on call.

## 2020-03-26 NOTE — PROGRESS NOTES
Patient have had an uneventful evening and night. He is currently resting with no acute distressnoted.

## 2020-03-26 NOTE — PLAN OF CARE
Notified Dr Khan pt's cbg 385 at 1215and last cbg 288 at 0840. Will cover pt with prn insulin aspart 5 U per sliding scale. Stopped D10 gtt at 0900. Pt is on isosource 1.5 at 50 ml/hr.     Dr ordered to continue tube feeding. Will continue to monitor.

## 2020-03-26 NOTE — PROGRESS NOTES
"[facility] Progress Note    Primary Team: Rafaela Romero*  Consultant Attending: Dr. Melly FERNANDEZ  Date of Admit: 3/21/2020    Reason for Consult     COVID 19 rule out & hydroxychlorquine recommendations     History of Present Illness     Patient presented to [facility] ED 3/21/20 with nonproductive cough, low grade fever/chills and SOB x 1 week. Patient denies recent travel. His wife has been ill with similar symptoms. Patient called his PCP due to concern for COVID 19. He did not meet testing criteria at the time. He was given prescription for po antibiotics. Patient reports no improvement despite antibiotics and OTC decongestants.      Today: Febrile to 101 overnight.    Review of Systems (positives in bold):  ROS  Defer to primary team ROS due to COVID19 testing    Allergies:  Review of patient's allergies indicates:   Allergen Reactions    Clindamycin Diarrhea       Medications:   In-Hospital Scheduled Medications:   atorvastatin  40 mg Oral QHS    azithromycin  500 mg Oral Daily    chlorhexidine  15 mL Mouth/Throat BID    clopidogreL  75 mg Oral Daily    dextromethorphan-guaifenesin  mg  2 tablet Oral BID    famotidine (PF)  20 mg Intravenous BID    fenofibrate  145 mg Oral Daily    heparin (porcine)  5,000 Units Subcutaneous Q12H    [START ON 3/27/2020] metoprolol succinate  25 mg Oral Daily         Past Medical History:  Past Medical History:   Diagnosis Date    Diabetes mellitus, type 2     Hyperlipidemia     Hypertension     Peripheral artery disease          Objective   Last 24 Hour Vital Signs:  BP  Min: 79/50  Max: 124/62  Temp  Av.8 °F (37.1 °C)  Min: 97.5 °F (36.4 °C)  Max: 101.1 °F (38.4 °C)  Pulse  Av.3  Min: 91  Max: 111  Resp  Av.8  Min: 17  Max: 42  SpO2  Av %  Min: 87 %  Max: 98 %  Height  Av' 8" (172.7 cm)  Min: 5' 8" (172.7 cm)  Max: 5' 8" (172.7 cm)  Weight  Av.9 kg (162 lb 14.7 oz)  Min: 73.9 kg (162 lb 14.7 oz)  Max: 73.9 " kg (162 lb 14.7 oz)        Physical Examination:  Physical Exam  Defer to primary team due to COVID19 testing    Laboratory:  CBC:   Lab Results   Component Value Date    WBC 13.34 (H) 03/26/2020    HGB 14.4 03/26/2020     03/26/2020    MCV 94 03/26/2020    RDW 13.3 03/26/2020       Estimated Creatinine Clearance: 19.6 mL/min (A) (based on SCr of 3.2 mg/dL (H)).        Assessment     Patient presented to [facility] ED 3/21/20 with nonproductive cough, low grade fever/chills and SOB x 1 week. Patient denies recent travel. His wife has been ill with similar symptoms. Patient called his PCP due to concern for COVID 19. He did not meet testing criteria at the time. He was given prescription for po antibiotics. Patient reports no improvement despite antibiotics and OTC decongestants.      Recommendations   Acute Hypoxemic Respiratory Failure & Pneumonia due to COVID19   - ok with stopping hydroxychloroquine early due to hypoglycemia  - febrile overnight, only on PO azithromycin, if fever persists would recommend repeat cultures and broadening antibiotic coverage       Thank you for this consult. We will follow.      Donald Powell MD  [facility]  [facility]

## 2020-03-26 NOTE — SUBJECTIVE & OBJECTIVE
Interval History: intubated,   Clinically better, but reported hypoglycemic episodes likely due to HC. Now on D10 and accuchecks. Continue to titrate tube feed to goal     worsening renal function. Awaits nephrology rec's  Continue  Hydroxychloroquine, azithromycin, & ceftriaxone and monitor QTc  Patient is COVID positive    Review of Systems   Unable to perform ROS: Intubated     Objective:     Vital Signs (Most Recent):  Temp: 97.6 °F (36.4 °C) (03/26/20 0805)  Pulse: 109 (03/26/20 0915)  Resp: 20 (03/26/20 0915)  BP: 115/66 (03/26/20 0915)  SpO2: (!) 88 % (03/26/20 0915) Vital Signs (24h Range):  Temp:  [97.5 °F (36.4 °C)-101.1 °F (38.4 °C)] 97.6 °F (36.4 °C)  Pulse:  [] 109  Resp:  [17-42] 20  SpO2:  [88 %-98 %] 88 %  BP: ()/(50-70) 115/66     Weight: 73.9 kg (162 lb 14.7 oz)  Body mass index is 24.77 kg/m².    Intake/Output Summary (Last 24 hours) at 3/26/2020 1134  Last data filed at 3/26/2020 0900  Gross per 24 hour   Intake 3070.4 ml   Output 320 ml   Net 2750.4 ml      Physical Exam   Constitutional: He appears well-developed. No distress.   Pulmonary/Chest: Tachypnea noted. No respiratory distress.   intubated   Nursing note and vitals reviewed.      Significant Labs:   ABGs:   No results for input(s): PH, PCO2, HCO3, POCSATURATED, BE, TOTALHB, COHB, METHB, O2HB, POCFIO2 in the last 48 hours.  Blood Culture: No results for input(s): LABBLOO in the last 48 hours.  BMP:   Recent Labs   Lab 03/26/20  0541   *   *   K 4.3   CL 97   CO2 18*   BUN 78*   CREATININE 3.2*   CALCIUM 7.6*   MG 2.5     CBC:   Recent Labs   Lab 03/25/20  0510 03/26/20  0542   WBC 6.20 13.34*   HGB 15.2 14.4   HCT 45.7 44.0    188     CMP:   Recent Labs   Lab 03/25/20  0510 03/26/20  0541   * 127*   K 4.1 4.3    97   CO2 19* 18*   GLU 45* 210*   BUN 72* 78*   CREATININE 2.7* 3.2*   CALCIUM 7.9* 7.6*   PROT 5.3* 5.1*   ALBUMIN 2.2* 1.9*   BILITOT 0.3 0.9   ALKPHOS 60 109   AST 67* 57*   ALT 45*  50*   ANIONGAP 10 12   EGFRNONAA 22* 18*     Lactic Acid: No results for input(s): LACTATE in the last 48 hours.  TSH: No results for input(s): TSH in the last 4320 hours.  Urine Culture: No results for input(s): LABURIN in the last 48 hours.  Urine Studies: No results for input(s): COLORU, APPEARANCEUA, PHUR, SPECGRAV, PROTEINUA, GLUCUA, KETONESU, BILIRUBINUA, OCCULTUA, NITRITE, UROBILINOGEN, LEUKOCYTESUR, RBCUA, WBCUA, BACTERIA, SQUAMEPITHEL, HYALINECASTS in the last 48 hours.    Invalid input(s): WRIGHTSUR    Significant Imaging: I have reviewed all pertinent imaging results/findings within the past 24 hours.

## 2020-03-26 NOTE — ASSESSMENT & PLAN NOTE
Contributing Nutrition Diagnosis  Inadequate energy intake    Related to (etiology):   intubation    Signs and Symptoms (as evidenced by):   NPO    Interventions:  Enteral nutrition    Nutrition Diagnosis Status:   Continues

## 2020-03-26 NOTE — PLAN OF CARE
Recommendation:   1. Consider changing TF to Peptamen Intense VHP at 10ml/hr and increase as tolerated to goal rate of 60ml/hr to provide 1440kcal, 132g protein, & 1209ml free water.   2. Will follow to assess nutrition status.    Goals:  1. Pt will tolerate TF.   2. TF will meet at least 85% estimated kcal/protein needs  Nutrition Goal Status: new

## 2020-03-27 NOTE — ASSESSMENT & PLAN NOTE
ASCVD (arteriosclerotic cardiovascular disease)  Hyperlipidemia   PAD (peripheral artery disease)  Continue atorvastatin 40 mg daily, clopidrogel 75 mg daily.  Hold Metoprolol succinate, amlodipine   Hold lisinopril due to worsening renal function

## 2020-03-27 NOTE — ASSESSMENT & PLAN NOTE
Avoid nephrotoxic agens  Renally dose all meds  Monitor  Consult nephrology-Appreciates nephrology rec's- IV lasix for now, may likely require HD given disease trajectory   Strict input/output

## 2020-03-27 NOTE — PROGRESS NOTES
[facility]  Critical Care - Medicine  Consult Note    Patient Name: Zhao Fischer  MRN: [id number]  Admission Date: 3/21/2020  Hospital Length of Stay: 4 days  Code Status: Full Code  Attending Physician: Rafaela Romero*  Primary Care Provider: Mannie Russell MD   Principal Problem: Pneumonia due to Severe Acute Respiratory Syndrome Coronavirus 2 (SARS-Cov-2)    Consults  Subjective:     Interval History:  Overnight the patient remains sedated.  He continues to have oliguric renal failure and his creatinine is worsening since admit.  Nephrology on board. Progressively hyponatremic. 470cc urine over the last 24 hours.    Past Medical History:   Diagnosis Date    Diabetes mellitus, type 2     Hyperlipidemia     Hypertension     Peripheral artery disease        Past Surgical History:   Procedure Laterality Date    ANGIOPLASTY      CARDIAC CATHETERIZATION         Review of patient's allergies indicates:   Allergen Reactions    Clindamycin Diarrhea       Family History     Problem Relation (Age of Onset)    Hypertension Father    No Known Problems Mother    Stroke Father        Tobacco Use    Smoking status: Former Smoker   Substance and Sexual Activity    Alcohol use: Yes     Comment: social    Drug use: Not on file    Sexual activity: Not on file      Review of Systems   Unable to perform ROS: Intubated     Objective:     Vital Signs (Most Recent):  Temp: 98 °F (36.7 °C) (03/27/20 0715)  Pulse: 95 (03/27/20 0715)  Resp: 19 (03/27/20 0715)  BP: (!) 92/53 (03/27/20 0715)  SpO2: (!) 92 % (03/27/20 0715) Vital Signs (24h Range):  Temp:  [97.2 °F (36.2 °C)-98 °F (36.7 °C)] 98 °F (36.7 °C)  Pulse:  [] 95  Resp:  [18-26] 19  SpO2:  [82 %-98 %] 92 %  BP: ()/(51-87) 92/53     Weight: 73.9 kg (162 lb 14.7 oz)  Body mass index is 24.77 kg/m².      Intake/Output Summary (Last 24 hours) at 3/27/2020 0834  Last data filed at 3/27/2020 0600  Gross per 24 hour   Intake 1029.57 ml    Output 460 ml   Net 569.57 ml       Physical Exam   Constitutional: He is oriented to person, place, and time. He appears well-developed and well-nourished. No distress.   HENT:   Head: Normocephalic and atraumatic.   Mouth/Throat: No oropharyngeal exudate.   Eyes: Pupils are equal, round, and reactive to light. No scleral icterus.   Neck: Normal range of motion. Neck supple.   Cardiovascular: Normal rate and regular rhythm. Exam reveals no gallop and no friction rub.   No murmur heard.  Pulmonary/Chest: No respiratory distress. He has no wheezes. He has rales. He exhibits no tenderness.   diffuse crackles present bilaterally   Abdominal: Soft. Bowel sounds are normal. He exhibits no distension. There is no tenderness.   Musculoskeletal: Normal range of motion. He exhibits no edema.   Neurological: He is alert and oriented to person, place, and time. No cranial nerve deficit.   Skin: Skin is warm and dry. He is not diaphoretic.   Psychiatric: He has a normal mood and affect. His behavior is normal. Judgment and thought content normal.       Vents:  Vent Mode: A/C (03/27/20 0115)  Set Rate: 18 BPM (03/27/20 0115)  Vt Set: 420 mL (03/27/20 0115)  Pressure Support: 0 cmH20 (03/27/20 0115)  PEEP/CPAP: 8 cmH20 (03/27/20 0115)  Oxygen Concentration (%): 40 (03/27/20 0715)  Peak Airway Pressure: 20 cmH2O (03/27/20 0115)  Plateau Pressure: 0 cmH20 (03/27/20 0115)  Total Ve: 9.02 mL (03/27/20 0115)  F/VT Ratio<105 (RSBI): (!) 43.29 (03/27/20 0115)    Lines/Drains/Airways     Drain                 NG/OG Tube 03/23/20 1157 orogastric Center mouth 3 days         Urethral Catheter 03/23/20 1206 3 days          Airway                 Airway - Non-Surgical 03/23/20 1148 Endotracheal Tube 3 days          Peripheral Intravenous Line                 Peripheral IV - Single Lumen 03/23/20 1158 20 G Right Hand 3 days         Peripheral IV - Single Lumen 03/23/20 1201 18 G Left Forearm 3 days         Peripheral IV - Single Lumen  03/24/20 1600 18 G Posterior;Right Forearm 2 days                Significant Labs:    CBC/Anemia Profile:  Recent Labs   Lab 03/26/20  0542 03/27/20  0536   WBC 13.34* 9.83   HGB 14.4 13.8*   HCT 44.0 42.1    203   MCV 94 92   RDW 13.3 13.2        Chemistries:  Recent Labs   Lab 03/26/20  0541 03/27/20  0536   * 125*   K 4.3 4.7   CL 97 97   CO2 18* 18*   BUN 78* 91*   CREATININE 3.2* 3.2*   CALCIUM 7.6* 7.7*   ALBUMIN 1.9* 1.7*   PROT 5.1* 5.0*   BILITOT 0.9 0.7   ALKPHOS 109 104   ALT 50* 33   AST 57* 28   MG 2.5 2.7*   PHOS 4.6* 4.1       All pertinent labs within the past 24 hours have been reviewed.    Significant Imaging: I have reviewed all pertinent imaging results/findings within the past 24 hours.    Assessment/Plan:     Active Diagnoses:    Diagnosis Date Noted POA    PRINCIPAL PROBLEM:  Pneumonia due to Severe Acute Respiratory Syndrome Coronavirus 2 (SARS-Cov-2) [J12.89, B97.29] 03/22/2020 Yes    Hypoglycemia [E16.2] 03/26/2020 Yes    Goals of care, counseling/discussion [Z71.89] 03/24/2020 Not Applicable    Counseling regarding advance care planning and goals of care [Z71.89] 03/24/2020 Not Applicable    Palliative care encounter [Z51.5] 03/24/2020 Not Applicable    Acute Respiratory Disease due to Covid-19 Virus [J06.9, B97.29] 03/24/2020 Yes    KEYUR (acute kidney injury) [N17.9] 03/24/2020 No    Elevated LFTs [R94.5] 03/24/2020 Yes    Cough [R05] 03/23/2020 Yes    Acute Respiratory Distress Syndrome (ARDS) due to Severe Acute Respiratory Syndrome Coronavirus 2 (SARS-Cov-2) [J80, B97.29] 03/22/2020 Yes    Acute respiratory failure with hypoxia [J96.01] 03/21/2020 Yes    Essential hypertension [I10] 01/09/2020 Yes     Chronic    Hyperlipidemia, mixed [E78.2] 01/09/2020 Yes     Chronic    PAD (peripheral artery disease) [I73.9] 01/09/2020 Yes     Chronic    Type 2 diabetes mellitus with diabetic peripheral angiopathy without gangrene, without long-term current use of insulin  [E11.51] 01/09/2020 Yes     Chronic    ASCVD (arteriosclerotic cardiovascular disease) [I25.10] 01/09/2020 Yes     Chronic      Problems Resolved During this Admission:       CNS  # need for sedation  - continue fentanyl and propofol gtt  - daily sat/sbt as indicated    PULM  # COVID 19 rule out  # bilateral pneumonia  # hypoxemic respiratory failure  # ARDS  - continue low tidal volume ventilation  - continue azithromycin x 5 days.  Plaquenil d/c due to hypoglycemia  - titrate vent according to the ARDSnet protocol to keep sats >90%  - ensure net negative fluid balance as possible.  Giving 80mg IV lasix today.  Will redose if inadequate output.  May unfortunately require HD if this trajectory continues.   - previously on ceftriaxone for CAP coverage, now discontinued.    CARDS  # HTN  # HLD  # PAD  # NSTEMI  - hold amlodipine, lisinopril.  Resume if hypertension occurs.  Metoprolol d/c today.  - continue statin, plavix, fenofibrate  - troponins down-trending now.    GI/FEN  # need for tube feeds  - will start tube feeds today.    RENAL  # KEYUR  # metabolic acidosis  # hyperphosphatemia  - daily labs to monitor for renal function.  Nephrology on board  - re-attempting lasix challenge today.  80mg IV given x 1.  Will follow up and redose as appropriate.  May need HD if this trajectory continues.    ID  # COVID 19 rule out  - continue therapy as above.    HEME  # no acute issues at this time.    Endocrine  # hypoglycemia  - improved  - continue tube feeds.  - d10 off  - ISS started        Thank you for your consult. I will follow-up with patient. Please contact us if you have any additional questions.     Nikolai Guevara MD  Critical Care - Medicine  [facility]

## 2020-03-27 NOTE — CARE UPDATE
This note also relates to the following rows which could not be included:  Oxygen Concentration (%) - Cannot attach notes to unvalidated device data  SpO2 - Cannot attach notes to unvalidated device data  Pulse - Cannot attach notes to unvalidated device data  Resp - Cannot attach notes to unvalidated device data  Ventilation Type - Cannot attach notes to unvalidated device data  Vent Mode - Cannot attach notes to unvalidated device data  Set Rate - Cannot attach notes to unvalidated device data  Vt Set - Cannot attach notes to unvalidated device data  PEEP/CPAP - Cannot attach notes to unvalidated device data  Pressure Support - Cannot attach notes to unvalidated device data  Waveform - Cannot attach notes to unvalidated device data  Peak Flow - Cannot attach notes to unvalidated device data  Set Inspiratory Pressure - Cannot attach notes to unvalidated device data  Insp Time - Cannot attach notes to unvalidated device data  Plateau Set/Insp. Hold (sec) - Cannot attach notes to unvalidated device data  Insp Rise Time  - Cannot attach notes to unvalidated device data  Trigger Sensitivity Flow/I-Trigger - Cannot attach notes to unvalidated device data  P High - Cannot attach notes to unvalidated device data  P Low - Cannot attach notes to unvalidated device data  T High - Cannot attach notes to unvalidated device data  T Low - Cannot attach notes to unvalidated device data  Resp Rate Total - Cannot attach notes to unvalidated device data  Peak Airway Pressure - Cannot attach notes to unvalidated device data  Mean Airway Pressure - Cannot attach notes to unvalidated device data  Plateau Pressure - Cannot attach notes to unvalidated device data  Exhaled Vt - Cannot attach notes to unvalidated device data  Total Ve - Cannot attach notes to unvalidated device data  Spont Ve - Cannot attach notes to unvalidated device data  I:E Ratio Measured - Cannot attach notes to unvalidated device data  Ve High Alarm - Cannot attach  notes to unvalidated device data  Resp Rate High Alarm - Cannot attach notes to unvalidated device data  Press High Alarm - Cannot attach notes to unvalidated device data  Apnea Rate - Cannot attach notes to unvalidated device data  Apnea Volume (mL) - Cannot attach notes to unvalidated device data  Apnea Oxygen Concentration  - Cannot attach notes to unvalidated device data  Apnea Flow Rate (L/min) - Cannot attach notes to unvalidated device data  T Apnea - Cannot attach notes to unvalidated device data    Pt. Received on vent with settings on the flow sheet. Pt. Sleeping in no apparent distress.  Vent check ok, alarms working and are audible. Vent volumes ok.

## 2020-03-27 NOTE — PROGRESS NOTES
[facility] Hospital Medicine  Progress Note    Patient Name: Zhao Fischer  MRN: [id number]  Patient Class: IP- Inpatient   Admission Date: 3/21/2020  Length of Stay: 4 days  Attending Physician: Rafaela Romero*  Primary Care Provider: Mannie Russell MD        Subjective:     Principal Problem:Pneumonia due to severe acute respiratory syndrome coronavirus 2 (SARS-CoV-2)        HPI:  Zhao Fischer is a 74 year old white man with hypertension, hyperlipidemia, diabetes mellitus type 2, peripheral artery disease, arteriosclerotic cardiovascular disease. He lives in [place], [place]. He has been  since he was 16 years old. His primary care physician is Dr. Mannie Russell.    He contacted Dr. Russell on 3/16/2020 to request testing for COVID-19 due to nonproductive cough, shortness of breath, and fever for the past day, but he could not get tested due to limited outpatient testing. He was prescribed levofloxacin 500 mg daily.    He presented to [facility] Emergency Department on 3/21/2020 with persistent symptoms, including diarrhea, which is common with COVID-19 infection, and poor appetite. He reported that his wife had similar symptoms. He was hypoxic, with oxygen saturation of 88%. Chest X-ray showed interstitial lung opacities. WBC count was low at 2780. BNP, lactate, and procalcitonin were low. CRP was elevated at 154 mg/L. COVID-19 testing has been liberal in the emergency department (criteria of cough alone is enough) so he was tested. He was not given anything other than supplemental oxygen in the emergency department. He was admitted to [facility].     Overview/Hospital Course:  He was put on hydroxychloroquine and azithromycin. His hypoxia worsened overnight between 3/22/2020 and 3/23/2020. Pulmonology was consulted.   3/24 COVID 19 positive and family updated, questions and concerns addressed.consult palliative care    Interval  History: intubated, blood glucose improved overnight, 265 this morning   Clinically better. Tube feed on hold  Worsening renal function. Appreciates nephrology rec's- IV lasix for now  Patient is COVID positive  D/c Hydroxychloroquine- to hypoglycemia per ID   D/c azithromycin and ceftriaxone      Review of Systems   Unable to perform ROS: Intubated     Objective:     Vital Signs (Most Recent):  Temp: 98 °F (36.7 °C) (03/27/20 1130)  Pulse: 105 (03/27/20 1145)  Resp: (!) 23 (03/27/20 1145)  BP: (!) 97/55 (03/27/20 1145)  SpO2: (!) 89 % (03/27/20 1145) Vital Signs (24h Range):  Temp:  [97.2 °F (36.2 °C)-98 °F (36.7 °C)] 98 °F (36.7 °C)  Pulse:  [] 105  Resp:  [18-28] 23  SpO2:  [82 %-98 %] 89 %  BP: ()/(51-87) 97/55     Weight: 73.9 kg (162 lb 14.7 oz)  Body mass index is 24.77 kg/m².    Intake/Output Summary (Last 24 hours) at 3/27/2020 1159  Last data filed at 3/27/2020 0800  Gross per 24 hour   Intake 879.57 ml   Output 460 ml   Net 419.57 ml      Physical Exam   Constitutional: He appears well-developed. No distress.   Pulmonary/Chest: Tachypnea noted. No respiratory distress.   intubated   Nursing note and vitals reviewed.      Significant Labs:   ABGs:   No results for input(s): PH, PCO2, HCO3, POCSATURATED, BE, TOTALHB, COHB, METHB, O2HB, POCFIO2 in the last 48 hours.  Blood Culture: No results for input(s): LABBLOO in the last 48 hours.  BMP:   Recent Labs   Lab 03/27/20  0536   *   *   K 4.7   CL 97   CO2 18*   BUN 91*   CREATININE 3.2*   CALCIUM 7.7*   MG 2.7*     CBC:   Recent Labs   Lab 03/26/20  0542 03/27/20  0536   WBC 13.34* 9.83   HGB 14.4 13.8*   HCT 44.0 42.1    203     CMP:   Recent Labs   Lab 03/26/20  0541 03/27/20  0536   * 125*   K 4.3 4.7   CL 97 97   CO2 18* 18*   * 270*   BUN 78* 91*   CREATININE 3.2* 3.2*   CALCIUM 7.6* 7.7*   PROT 5.1* 5.0*   ALBUMIN 1.9* 1.7*   BILITOT 0.9 0.7   ALKPHOS 109 104   AST 57* 28   ALT 50* 33   ANIONGAP 12 10    EGFRNONAA 18* 18*     Lactic Acid: No results for input(s): LACTATE in the last 48 hours.  TSH: No results for input(s): TSH in the last 4320 hours.  Urine Culture: No results for input(s): LABURIN in the last 48 hours.  Urine Studies: No results for input(s): COLORU, APPEARANCEUA, PHUR, SPECGRAV, PROTEINUA, GLUCUA, KETONESU, BILIRUBINUA, OCCULTUA, NITRITE, UROBILINOGEN, LEUKOCYTESUR, RBCUA, WBCUA, BACTERIA, SQUAMEPITHEL, HYALINECASTS in the last 48 hours.    Invalid input(s): WRIGHTSUR    Significant Imaging: I have reviewed all pertinent imaging results/findings within the past 24 hours.      Assessment/Plan:      * Pneumonia due to severe acute respiratory syndrome coronavirus 2 (SARS-CoV-2)  Acute Respiratory Disease due to Covid-19 Virus  Acute respiratory failure with hypoxia    Appreciate Infectious Disease and Pulmonology.   COVID 19 positive- per ID - ydroxychloroquine, azithromycin & ceftriaxone and monitor QTc  Consult palliative care for support    Hypoglycemia  Likely due to hydroxychloroquine  continue D10 and titrate TF to goal      Elevated LFTs  monitor      KEYUR (acute kidney injury)  Avoid nephrotoxic agens  Renally dose all meds  Monitor  Consult nephrology-Appreciates nephrology rec's- IV lasix for now, may likely require HD given disease trajectory   Strict input/output      Acute respiratory failure with hypoxia  See primary problem.    Essential hypertension  ASCVD (arteriosclerotic cardiovascular disease)  Hyperlipidemia   PAD (peripheral artery disease)  Continue atorvastatin 40 mg daily, clopidrogel 75 mg daily.  Hold Metoprolol succinate, amlodipine   Hold lisinopril due to worsening renal function    Type 2 diabetes mellitus with diabetic peripheral angiopathy without gangrene, without long-term current use of insulin  Hypoglycemia  Likely due to plaquenil- d/c per ID  Hold home glimepiride, empagliflozin. I  insulin aspart sliding scale.      VTE Risk Mitigation (From admission,  onward)         Ordered     heparin (porcine) injection 5,000 Units  Every 12 hours      03/23/20 1357     IP VTE HIGH RISK PATIENT  Once      03/21/20 2227     Place sequential compression device  Until discontinued      03/21/20 2227                Critical care time spent on the evaluation and treatment of severe organ dysfunction, review of pertinent labs and imaging studies, discussions with consulting providers and discussions with patient/family: 40 minutes.      Rafaela Romero MD  Department of Hospital Medicine   [facility]

## 2020-03-27 NOTE — EICU
Rounding (Video Assessment):  No    Intervention Initiated From:  Bedside    Hood Communicated with Bedside Nurse regarding:  Time-Out    Nurse Notified:  No    Doctor Notified:  No    Comments: Trialysis

## 2020-03-27 NOTE — ASSESSMENT & PLAN NOTE
Acute Respiratory Disease due to Covid-19 Virus  Acute respiratory failure with hypoxia    Appreciate Infectious Disease and Pulmonology.   COVID 19 positive- per ID - ydroxychloroquine, azithromycin & ceftriaxone and monitor QTc  Consult palliative care for support

## 2020-03-27 NOTE — PROGRESS NOTES
[facility] Progress Note    Subjective:      Zhao Fischer is a 74 y.o.  male who is being followed by the [facility] service at [facility] for KEYUR.  Patient is intubated, sedated and COVID 19 +     Objective:   Last 24 Hour Vital Signs:  BP  Min: 85/53  Max: 152/87  Temp  Av.8 °F (36.6 °C)  Min: 97.2 °F (36.2 °C)  Max: 98 °F (36.7 °C)  Pulse  Av.2  Min: 94  Max: 122  Resp  Av.3  Min: 18  Max: 28  SpO2  Av.8 %  Min: 82 %  Max: 98 %  I/O last 3 completed shifts:  In: 2267.6 [I.V.:1007.6; NG/GT:1260]  Out: 680 [Urine:680]    Physical Examination:  COVID POSITIVE  DEFER EXAM      Laboratory:  Laboratory Data Reviewed: yes    Microbiology Data Reviewed: yes    Radiology Data Reviewed: yes    Current Medications:     Infusions:   dextrose 10 % in water (D10W) Stopped (20 0900)    fentanyl 100 mcg/hr (20 0035)    propofoL 10 mcg/kg/min (20 1131)        Scheduled:   atorvastatin  40 mg Oral QHS    chlorhexidine  15 mL Mouth/Throat BID    clopidogreL  75 mg Oral Daily    dextromethorphan-guaifenesin  mg  2 tablet Oral BID    famotidine (PF)  20 mg Intravenous Daily    fenofibrate  145 mg Oral Daily    heparin (porcine)  5,000 Units Subcutaneous Q12H        PRN:  acetaminophen, albuterol, aluminum-magnesium hydroxide-simethicone, benzocaine, benzonatate, Dextrose 10% Bolus, Dextrose 10% Bolus, fentaNYL, glucagon (human recombinant), glucose, glucose, insulin aspart U-100, melatonin, ondansetron, sodium chloride 0.9%      Assessment and Plan:    Zhao Fischer is a 74 y.o.male with        #) Oliguric KEYUR   - Baseline Cr around 1.1, worsened to 3.2 today  - COVID 19 positive  - Urea Na: < 20, FeNA < 0.2, BUN/Cr: >20:1  - Dehydration vs hypotension (ATN) vs post renal  - s/p 500 cc bolus   - Patient is Net +3.7 L.  cc, Lasix 60 IV x Once at 12 noon, will monitor response, may need another dose.  - Will continue to monitor  - Renally dose  all medications, avoid contrast and phos based enemas.  2020  - Cr stable at 3.2, UOP: 475 ml  - Lasix 80 mg IV and reaccess     #) Hyponatremia  - Na: 127, down from 131  - We recommend getting Serum osmolality, Urine osmolality and urine electrolytes (Na, K, Cr and Cl)  2020  - no labs yet, will order     #) Anion Gap metabolic Acidosis  - HCO3: 18, A, Corrected A.25  - 1 amp of NaHCO3 once.   2020  - 1 amp of NaHCo3     Thank you for allowing us in taking care of this patient. Please call us if you have any questions regarding this patient.         Darline Dhaliwal MD  [facility]  [phone]     If after 5pm please forward any questions to the [facility] Fellow/Attending on call.

## 2020-03-27 NOTE — PROGRESS NOTES
Patient have had an uneventful evening and night. It was noted early in the shift that he has a prolonged coughing episode. These episode is causing him to desats. His tube feed was turned off at 2200, there was a significant amount in his mouth . Will continue to monitor for restart.

## 2020-03-27 NOTE — PROGRESS NOTES
[facility] Progress Note    Primary Team: Rafaela Romero*  Consultant Attending: Dr. Angelina FERNANDEZ  Date of Admit: 3/21/2020    Reason for Consult     COVID 19 rule out & hydroxychlorquine recommendations     History of Present Illness     Patient presented to [facility] ED [facility]/21/20 with nonproductive cough, low grade fever/chills and SOB x 1 week. Patient denies recent travel. His wife has been ill with similar symptoms. Patient called his PCP due to concern for COVID 19. He did not meet testing criteria at the time. He was given prescription for po antibiotics. Patient reports no improvement despite antibiotics and OTC decongestants.      Today: Afebrile overnight. WBC improved. Remains oliguric    Review of Systems (positives in bold):  ROS  Defer to primary team ROS due to COVID19 testing    Allergies:  Review of patient's allergies indicates:   Allergen Reactions    Clindamycin Diarrhea       Medications:   In-Hospital Scheduled Medications:   atorvastatin  40 mg Oral QHS    azithromycin  500 mg Oral Daily    chlorhexidine  15 mL Mouth/Throat BID    clopidogreL  75 mg Oral Daily    dextromethorphan-guaifenesin  mg  2 tablet Oral BID    famotidine (PF)  20 mg Intravenous Daily    fenofibrate  145 mg Oral Daily    heparin (porcine)  5,000 Units Subcutaneous Q12H         Past Medical History:  Past Medical History:   Diagnosis Date    Diabetes mellitus, type 2     Hyperlipidemia     Hypertension     Peripheral artery disease          Objective   Last 24 Hour Vital Signs:  BP  Min: 85/53  Max: 152/87  Temp  Av.8 °F (36.6 °C)  Min: 97.2 °F (36.2 °C)  Max: 98 °F (36.7 °C)  Pulse  Av.8  Min: 94  Max: 122  Resp  Av  Min: 18  Max: 26  SpO2  Av %  Min: 82 %  Max: 98 %        Physical Examination:  Physical Exam  Defer to primary team due to COVID19 testing    Laboratory:  CBC:   Lab Results   Component Value Date    WBC 9.83 2020    HGB 13.8 (L) 2020      03/27/2020    MCV 92 03/27/2020    RDW 13.2 03/27/2020       Estimated Creatinine Clearance: 19.6 mL/min (A) (based on SCr of 3.2 mg/dL (H)).        Assessment     Patient presented to [facility] ED 3/21/20 with nonproductive cough, low grade fever/chills and SOB x 1 week. Patient denies recent travel. His wife has been ill with similar symptoms. Patient called his PCP due to concern for COVID 19. He did not meet testing criteria at the time. He was given prescription for po antibiotics. Patient reports no improvement despite antibiotics and OTC decongestants.      Recommendations   Acute Hypoxemic Respiratory Failure & Pneumonia due to COVID19   - ok with stopping hydroxychloroquine early due to hypoglycemia  - ok with stopping azithromycin since  - still requiring ventilatory support      Thank you for this consult. We will follow.      Maggie West MD  [facility]  [facility]

## 2020-03-27 NOTE — ASSESSMENT & PLAN NOTE
Hypoglycemia  Likely due to plaquenil- d/c per ID  Hold home glimepiride, empagliflozin. I  insulin aspart sliding scale.

## 2020-03-27 NOTE — PHYSICIAN QUERY
PT Name: Zhao Fischer  MR #: [id number]     Physician Query Form - Diagnosis Clarification      CDS: Farida Crenshaw RN      Contact information:[email or web address] or (cell) [phone]    This form is a permanent document in the medical record.     Query Date: March 27, 2020    By submitting this query, we are merely seeking further clarification of documentation.  Please utilize your independent clinical judgment when addressing the question(s) below.     The medical record contains the following:      Findings Supporting Clinical Information Location in Medical Record   KEYUR with ATN   Oliguric KEYUR   - Baseline Cr around 1.1, worsened to 3.2 today  - COVID 19 positive  - Urea Na: < 20, FeNA < 0.2, BUN/Cr: >20:1  - Dehydration vs hypotension (ATN) vs post renal  - s/p 500 cc bolus   - Patient is Net +3.7 L.  cc, Lasix 60 IV x Once at 12 noon, will monitor response, may need another dose.  - Will continue to monitor  - Renally dose all medications, avoid contrast and phos based enemas.       3/21 3/24 3/25 3/26 3/2   BUN 15 54  72  78  91    Creat 1.1 2.0  2.7  3.2  3.2    eGFR  >60 32  22  18  18        Nephrology Consult 3/26                        Lab Results      Please clarify if the KEYUR with ATN diagnosis has been:    [x  ] Ruled In   [  ] Ruled In, Now Resolved   [  ] Ruled Out   [  ] Other/Clarification of findings (please specify):     [  ] Clinically undetermined     Please document in your progress notes daily for the duration of treatment, until resolved, and include in your discharge summary.

## 2020-03-27 NOTE — PROGRESS NOTES
Patient seen and examined by me. I agree with the trainee's separate note except as modified.     No major overnight events.     I/O: +4.5L overall    Afebrile, HR 90s, MAP 65-75. 94% on 50% and 8 PEEP.     Labs: creatinine 3.2    On my exam: sedated on the vent    Impression: COVID-19 with respiratory failure and KEYUR    Recommendations:   -wean down vent settings if able  -trial of lasix. Has HD catheter in place if need for RRT  -renal following closely   -stop azithromycin    Critical care time (30min) spent personally by me on the following activities: development of treatment plan with patient or surrogate and bedside caregivers, discussions with consultants, evaluation of patient's response to treatment, examination of patient, ordering and performing treatments and interventions, ordering and review of laboratory studies, ordering and review of radiographic studies, pulse oximetry, re-evaluation of patient's condition. This critical care time did not overlap with that of any other provider or involve time for any procedures.

## 2020-03-27 NOTE — PROCEDURES
03/27/2020    Zhao Fischer  [id number]    PROCEDURE PERFORMED: LIJ Trialysis Catheter    PERFORMING SURGEON: Zack Gomez Jr    CONSENT:  The risks benefits and alternatives of the procedure were discussed with the patient, all questions and concerns were addressed and consent was provided for the procedure.    ANESTHESIA: Lidocaine 1%    INDICATION: Acute renal failure requiring hemodialysis    FINDINGS:   - Left IJ found to be widely patent on ultrasound  - return of dark red, non-pulsatile blood returned and wire confirmed in vein on US  - all ports aspirated and flushed with ease    PROCEDURE IN DETAIL: The patient was placed in slight trendelenburg position. The left neck was prepped and draped in typical standard fashion. A time out was performed. The ultrasound was used to identify the vein which was found to be widely patent. Lidocaine 1% was injected over the planned incision site and a small skin knick was made. An 18 gauge needle was introduced under constant negative pressure into the vein under ultrasound guidance. There was a return of dark red, non-pulsatile blood and the wire was easily passed and confirmed in the vein using ultrasound. The tract was then serially dilated and then the trialysis catheter was introduced. All ports were found to aspirate with ease then were flushed with injectable saline. A biopatch was placed and the catheter was secured to the skin with sutures. A sterile, occlusive dressing was then applied.    EBL: 5 cc    COMPLICATIONS: none    CONDITION: critical     DISPO: Post procedural CXR to be obtained.

## 2020-03-27 NOTE — SUBJECTIVE & OBJECTIVE
Interval History: intubated, blood glucose improved overnight, 265 this morning   Clinically better. Tube feed on hold  Worsening renal function. Appreciates nephrology rec's- IV lasix for now  Patient is COVID positive  D/c Hydroxychloroquine- to hypoglycemia per ID   D/c azithromycin and ceftriaxone      Review of Systems   Unable to perform ROS: Intubated     Objective:     Vital Signs (Most Recent):  Temp: 98 °F (36.7 °C) (03/27/20 1130)  Pulse: 105 (03/27/20 1145)  Resp: (!) 23 (03/27/20 1145)  BP: (!) 97/55 (03/27/20 1145)  SpO2: (!) 89 % (03/27/20 1145) Vital Signs (24h Range):  Temp:  [97.2 °F (36.2 °C)-98 °F (36.7 °C)] 98 °F (36.7 °C)  Pulse:  [] 105  Resp:  [18-28] 23  SpO2:  [82 %-98 %] 89 %  BP: ()/(51-87) 97/55     Weight: 73.9 kg (162 lb 14.7 oz)  Body mass index is 24.77 kg/m².    Intake/Output Summary (Last 24 hours) at 3/27/2020 1159  Last data filed at 3/27/2020 0800  Gross per 24 hour   Intake 879.57 ml   Output 460 ml   Net 419.57 ml      Physical Exam   Constitutional: He appears well-developed. No distress.   Pulmonary/Chest: Tachypnea noted. No respiratory distress.   intubated   Nursing note and vitals reviewed.      Significant Labs:   ABGs:   No results for input(s): PH, PCO2, HCO3, POCSATURATED, BE, TOTALHB, COHB, METHB, O2HB, POCFIO2 in the last 48 hours.  Blood Culture: No results for input(s): LABBLOO in the last 48 hours.  BMP:   Recent Labs   Lab 03/27/20  0536   *   *   K 4.7   CL 97   CO2 18*   BUN 91*   CREATININE 3.2*   CALCIUM 7.7*   MG 2.7*     CBC:   Recent Labs   Lab 03/26/20  0542 03/27/20  0536   WBC 13.34* 9.83   HGB 14.4 13.8*   HCT 44.0 42.1    203     CMP:   Recent Labs   Lab 03/26/20  0541 03/27/20  0536   * 125*   K 4.3 4.7   CL 97 97   CO2 18* 18*   * 270*   BUN 78* 91*   CREATININE 3.2* 3.2*   CALCIUM 7.6* 7.7*   PROT 5.1* 5.0*   ALBUMIN 1.9* 1.7*   BILITOT 0.9 0.7   ALKPHOS 109 104   AST 57* 28   ALT 50* 33   ANIONGAP 12  10   EGFRNONAA 18* 18*     Lactic Acid: No results for input(s): LACTATE in the last 48 hours.  TSH: No results for input(s): TSH in the last 4320 hours.  Urine Culture: No results for input(s): LABURIN in the last 48 hours.  Urine Studies: No results for input(s): COLORU, APPEARANCEUA, PHUR, SPECGRAV, PROTEINUA, GLUCUA, KETONESU, BILIRUBINUA, OCCULTUA, NITRITE, UROBILINOGEN, LEUKOCYTESUR, RBCUA, WBCUA, BACTERIA, SQUAMEPITHEL, HYALINECASTS in the last 48 hours.    Invalid input(s): MYRA    Significant Imaging: I have reviewed all pertinent imaging results/findings within the past 24 hours.

## 2020-03-27 NOTE — PROGRESS NOTES
The pt currently in ICU, on vent. Case Management will follow the pt throughout transitions of care and will assist with any d/c needs

## 2020-03-28 NOTE — PLAN OF CARE
Pt arouses to voice and withdraws from pain. He does not follow commands. Afebrile overnight. ST and BP wnl. BP increases when agitated but decrease when pt calms down.  Sats remained >90 on vent. Pt does have some episodes of coughing where sats decrease but when done pt sats rebound to 90s. UOP minimal with kendrick. Fentanyl and propofol gtt present.  Airborne, droplet, and contact precautions remain in place. Frequent checks for safety done during shift. Will report to oncoming RN.

## 2020-03-28 NOTE — ASSESSMENT & PLAN NOTE
Hypoglycemia  Likely due to plaquenil- d/c per ID  Hold home glimepiride, empagliflozin.   levemir   insulin aspart sliding scale.

## 2020-03-28 NOTE — SUBJECTIVE & OBJECTIVE
Interval History: intubated, blood glucose improved, will restart on low dose levemir and sliding scale.  Worsening renal function but stable. Appreciates nephrology rec's- IV lasix for now  Patient is COVID positive    Review of Systems   Unable to perform ROS: Intubated     Objective:     Vital Signs (Most Recent):  Temp: 97.6 °F (36.4 °C) (03/28/20 0301)  Pulse: (!) 123 (03/28/20 0715)  Resp: (!) 24 (03/28/20 0715)  BP: 118/70 (03/28/20 0715)  SpO2: (!) 93 % (03/28/20 0715) Vital Signs (24h Range):  Temp:  [97.6 °F (36.4 °C)-98.8 °F (37.1 °C)] 97.6 °F (36.4 °C)  Pulse:  [] 123  Resp:  [16-35] 24  SpO2:  [87 %-95 %] 93 %  BP: ()/(55-90) 118/70     Weight: 73.9 kg (162 lb 14.7 oz)  Body mass index is 24.77 kg/m².    Intake/Output Summary (Last 24 hours) at 3/28/2020 1006  Last data filed at 3/28/2020 0600  Gross per 24 hour   Intake 725.52 ml   Output 615 ml   Net 110.52 ml      Physical Exam   Constitutional: He appears well-developed. No distress.   Pulmonary/Chest: Tachypnea noted. No respiratory distress.   intubated   Nursing note and vitals reviewed.      Significant Labs:   ABGs:   No results for input(s): PH, PCO2, HCO3, POCSATURATED, BE, TOTALHB, COHB, METHB, O2HB, POCFIO2 in the last 48 hours.  Blood Culture: No results for input(s): LABBLOO in the last 48 hours.  BMP:   Recent Labs   Lab 03/28/20  0330   *   *   K 5.1   CL 98   CO2 17*   BUN 95*   CREATININE 3.0*   CALCIUM 8.2*   MG 2.8*     CBC:   Recent Labs   Lab 03/27/20  0536 03/28/20  0330   WBC 9.83 13.15*   HGB 13.8* 13.9*   HCT 42.1 41.4    262     CMP:   Recent Labs   Lab 03/27/20  0536 03/28/20  0330   * 128*   K 4.7 5.1   CL 97 98   CO2 18* 17*   * 204*   BUN 91* 95*   CREATININE 3.2* 3.0*   CALCIUM 7.7* 8.2*   PROT 5.0* 5.6*   ALBUMIN 1.7* 1.8*   BILITOT 0.7 0.8   ALKPHOS 104 106   AST 28 26   ALT 33 27   ANIONGAP 10 13   EGFRNONAA 18* 20*     Lactic Acid: No results for input(s): LACTATE in the  last 48 hours.  TSH: No results for input(s): TSH in the last 4320 hours.  Urine Culture: No results for input(s): LABURIN in the last 48 hours.  Urine Studies: No results for input(s): COLORU, APPEARANCEUA, PHUR, SPECGRAV, PROTEINUA, GLUCUA, KETONESU, BILIRUBINUA, OCCULTUA, NITRITE, UROBILINOGEN, LEUKOCYTESUR, RBCUA, WBCUA, BACTERIA, SQUAMEPITHEL, HYALINECASTS in the last 48 hours.    Invalid input(s): MYRA    Significant Imaging: I have reviewed all pertinent imaging results/findings within the past 24 hours.

## 2020-03-28 NOTE — PROGRESS NOTES
[facility]  Progress Note    Patient Name: Zhao Fischer  MRN: [id number]  Patient Class: IP- Inpatient   Admission Date: 3/21/2020  Length of Stay: 5 days  Attending Physician: Rafaela Romero*  Primary Care Provider: Mannie Russell MD        Subjective:     Principal Problem:Pneumonia due to severe acute respiratory syndrome coronavirus 2 (SARS-CoV-2)        HPI:  Zhao Fischer is a 74 year old white man with hypertension, hyperlipidemia, diabetes mellitus type 2, peripheral artery disease, arteriosclerotic cardiovascular disease. He lives in [place], [place]. He has been  since he was 16 years old. His primary care physician is Dr. Mannie Russell.    He contacted Dr. Russell on 3/16/2020 to request testing for COVID-19 due to nonproductive cough, shortness of breath, and fever for the past day, but he could not get tested due to limited outpatient testing. He was prescribed levofloxacin 500 mg daily.    He presented to [facility] Emergency Department on 3/21/2020 with persistent symptoms, including diarrhea, which is common with COVID-19 infection, and poor appetite. He reported that his wife had similar symptoms. He was hypoxic, with oxygen saturation of 88%. Chest X-ray showed interstitial lung opacities. WBC count was low at 2780. BNP, lactate, and procalcitonin were low. CRP was elevated at 154 mg/L. COVID-19 testing has been liberal in the emergency department (criteria of cough alone is enough) so he was tested. He was not given anything other than supplemental oxygen in the emergency department. He was admitted to [facility].     Overview/Hospital Course:  He was put on hydroxychloroquine and azithromycin. His hypoxia worsened overnight between 3/22/2020 and 3/23/2020. Pulmonology was consulted.   3/24 COVID 19 positive and family updated, questions and concerns addressed.consult palliative care    Interval  History: intubated, blood glucose improved, will restart on low dose levemir and sliding scale.  Worsening renal function but stable. Appreciates nephrology rec's- IV lasix for now  Patient is COVID positive    Review of Systems   Unable to perform ROS: Intubated     Objective:     Vital Signs (Most Recent):  Temp: 97.6 °F (36.4 °C) (03/28/20 0301)  Pulse: (!) 123 (03/28/20 0715)  Resp: (!) 24 (03/28/20 0715)  BP: 118/70 (03/28/20 0715)  SpO2: (!) 93 % (03/28/20 0715) Vital Signs (24h Range):  Temp:  [97.6 °F (36.4 °C)-98.8 °F (37.1 °C)] 97.6 °F (36.4 °C)  Pulse:  [] 123  Resp:  [16-35] 24  SpO2:  [87 %-95 %] 93 %  BP: ()/(55-90) 118/70     Weight: 73.9 kg (162 lb 14.7 oz)  Body mass index is 24.77 kg/m².    Intake/Output Summary (Last 24 hours) at 3/28/2020 1006  Last data filed at 3/28/2020 0600  Gross per 24 hour   Intake 725.52 ml   Output 615 ml   Net 110.52 ml      Physical Exam   Constitutional: He appears well-developed. No distress.   Pulmonary/Chest: Tachypnea noted. No respiratory distress.   intubated   Nursing note and vitals reviewed.      Significant Labs:   ABGs:   No results for input(s): PH, PCO2, HCO3, POCSATURATED, BE, TOTALHB, COHB, METHB, O2HB, POCFIO2 in the last 48 hours.  Blood Culture: No results for input(s): LABBLOO in the last 48 hours.  BMP:   Recent Labs   Lab 03/28/20  0330   *   *   K 5.1   CL 98   CO2 17*   BUN 95*   CREATININE 3.0*   CALCIUM 8.2*   MG 2.8*     CBC:   Recent Labs   Lab 03/27/20  0536 03/28/20  0330   WBC 9.83 13.15*   HGB 13.8* 13.9*   HCT 42.1 41.4    262     CMP:   Recent Labs   Lab 03/27/20  0536 03/28/20  0330   * 128*   K 4.7 5.1   CL 97 98   CO2 18* 17*   * 204*   BUN 91* 95*   CREATININE 3.2* 3.0*   CALCIUM 7.7* 8.2*   PROT 5.0* 5.6*   ALBUMIN 1.7* 1.8*   BILITOT 0.7 0.8   ALKPHOS 104 106   AST 28 26   ALT 33 27   ANIONGAP 10 13   EGFRNONAA 18* 20*     Lactic Acid: No results for input(s): LACTATE in the last 48  hours.  TSH: No results for input(s): TSH in the last 4320 hours.  Urine Culture: No results for input(s): LABURIN in the last 48 hours.  Urine Studies: No results for input(s): COLORU, APPEARANCEUA, PHUR, SPECGRAV, PROTEINUA, GLUCUA, KETONESU, BILIRUBINUA, OCCULTUA, NITRITE, UROBILINOGEN, LEUKOCYTESUR, RBCUA, WBCUA, BACTERIA, SQUAMEPITHEL, HYALINECASTS in the last 48 hours.    Invalid input(s): WRIGHTSUR    Significant Imaging: I have reviewed all pertinent imaging results/findings within the past 24 hours.      Assessment/Plan:      * Pneumonia due to severe acute respiratory syndrome coronavirus 2 (SARS-CoV-2)  Acute Respiratory Disease due to Covid-19 Virus  Acute respiratory failure with hypoxia    Appreciate Infectious Disease and Pulmonology.   COVID 19 positive- per ID - ydroxychloroquine, azithromycin & ceftriaxone and monitor QTc  Consult palliative care for support    Hypoglycemia  Likely due to hydroxychloroquine  continue D10 and titrate TF to goal      Elevated LFTs  monitor      KEYUR (acute kidney injury)  Avoid nephrotoxic agens  Renally dose all meds  Monitor  Consult nephrology-Appreciates nephrology rec's- IV lasix for now, may likely require HD given disease trajectory   Strict input/output      Acute respiratory failure with hypoxia  See primary problem.    Essential hypertension  ASCVD (arteriosclerotic cardiovascular disease)  Hyperlipidemia   PAD (peripheral artery disease)  Continue atorvastatin 40 mg daily, clopidrogel 75 mg daily.  Hold Metoprolol succinate, amlodipine   Hold lisinopril due to worsening renal function    Type 2 diabetes mellitus with diabetic peripheral angiopathy without gangrene, without long-term current use of insulin  Hypoglycemia  Likely due to plaquenil- d/c per ID  Hold home glimepiride, empagliflozin.   levemir   insulin aspart sliding scale.      VTE Risk Mitigation (From admission, onward)         Ordered     heparin (porcine) injection 5,000 Units  Every 12  hours      03/23/20 1357     IP VTE HIGH RISK PATIENT  Once      03/21/20 2227     Place sequential compression device  Until discontinued      03/21/20 2227                Critical care time spent on the evaluation and treatment of severe organ dysfunction, review of pertinent labs and imaging studies, discussions with consulting providers and discussions with patient/family: 35 minutes.      Rafaela Romero MD  Department of Hospital Medicine   [facility]

## 2020-03-28 NOTE — PROGRESS NOTES
Patient seen and examined by me. I agree with the trainee's separate note except as modified.       No major events noted overnight.     I/O: even (+4.5L)  Afebrile, -130s, MAP 85, 92% on 40% and 8 PEEP.     WBC up to 13. Na 128, bicarb 17, creatinine stable at 3.0.   CXR reviewed by me: HD line in place. No PTX. Mild increase in pulmonary opacities compared to 1 day prior.     On my exam: awakens when sedation is paused, but is not as responsive as past days    Impression: COVID-19 with respiratory failure and KEYUR. Mental status change is possibly due to uremia, as this change corresponds to his rising BUN    Recommendations:   -add precedex. Can use fentanyl as well  -lasix gtt in last attempt to pull off fluid prior to initiating dialysis  -add back low dose beta blocker  -increase TFs  -off abx    Critical care time (30min) spent personally by me on the following activities: development of treatment plan with patient or surrogate and bedside caregivers, discussions with consultants, evaluation of patient's response to treatment, examination of patient, ordering and performing treatments and interventions, ordering and review of laboratory studies, ordering and review of radiographic studies, pulse oximetry, re-evaluation of patient's condition. This critical care time did not overlap with that of any other provider or involve time for any procedures.

## 2020-03-28 NOTE — PLAN OF CARE
Recommendation:   1. Consider changing TF to Impact Peptide at goal rate 1620 kcal, 101 gm pro, 832 mL free water. Additional 250 mL free water flushes QID or per MD.   2. WIll follow to assess nutrition status.    Goals:   1. Pt will tolerate TF.   2. TF will meet at least 85% estimated kcal/protein needs    Nutrition Goal Status: new  Communication of RD Recs: other (comment)(POC)      Problem: Nutrition Impairment (Mechanical Ventilation, Invasive)  Goal: Optimal Nutrition Delivery  Outcome: Ongoing, Progressing     Problem: Feeding Intolerance (Enteral Nutrition)  Goal: Feeding Tolerance  Outcome: Ongoing, Progressing     Problem: Wound  Goal: Optimal Wound Healing  Outcome: Ongoing, Progressing

## 2020-03-28 NOTE — PROGRESS NOTES
[facility] Progress Note    Primary Team: Rafaela Romero*  Consultant Attending: Dr. Angelina FERNANDEZ  Date of Admit: 3/21/2020    Reason for Consult     COVID 19 rule out & hydroxychlorquine recommendations     History of Present Illness     Patient presented to [facility] ED 3/21/20 with nonproductive cough, low grade fever/chills and SOB x 1 week. Patient denies recent travel. His wife has been ill with similar symptoms. Patient called his PCP due to concern for COVID 19. He did not meet testing criteria at the time. He was given prescription for po antibiotics. Patient reports no improvement despite antibiotics and OTC decongestants.      Today: Afebrile past 24 hours. Finished course of antibiotics yesterday. No major changes.    Review of Systems (positives in bold):  ROS  Defer to primary team ROS due to COVID19 testing    Allergies:  Review of patient's allergies indicates:   Allergen Reactions    Clindamycin Diarrhea       Medications:   In-Hospital Scheduled Medications:   atorvastatin  40 mg Oral QHS    chlorhexidine  15 mL Mouth/Throat BID    clopidogreL  75 mg Oral Daily    dextromethorphan-guaifenesin  mg  2 tablet Oral BID    famotidine (PF)  20 mg Intravenous Daily    fenofibrate  145 mg Oral Daily    heparin (porcine)  5,000 Units Subcutaneous Q12H    sodium bicarbonate  25 mEq Intravenous Once       Objective   Last 24 Hour Vital Signs:  BP  Min: 97/61  Max: 162/77  Temp  Av °F (36.7 °C)  Min: 97.6 °F (36.4 °C)  Max: 98.8 °F (37.1 °C)  Pulse  Av.1  Min: 98  Max: 131  Resp  Av.8  Min: 16  Max: 35  SpO2  Av.2 %  Min: 87 %  Max: 95 %        Physical Examination:  Physical Exam  Defer to primary team due to COVID19 testing    Laboratory:  CBC:   Lab Results   Component Value Date    WBC 13.15 (H) 2020    HGB 13.9 (L) 2020     2020    MCV 92 2020    RDW 13.2 2020       Estimated Creatinine Clearance: 20.9 mL/min (A)  (based on SCr of 3 mg/dL (H)).        Assessment     Patient presented to [facility] ED 3/21/20 with nonproductive cough, low grade fever/chills and SOB x 1 week. Patient denies recent travel. His wife has been ill with similar symptoms. Patient called his PCP due to concern for COVID 19. He did not meet testing criteria at the time. He was given prescription for po antibiotics. Patient reports no improvement despite antibiotics and OTC decongestants.      Recommendations   Acute Hypoxemic Respiratory Failure & Pneumonia due to COVID19   - s/p shortened course of hydroxychloroquine 2/2 hypoglycemia  - finished full course of antibiotics yesterday  - still requiring ventilatory support      Thank you for this consult. Will follow from afar at this point. Please call with any questions.      Donald Powell MD  [facility]  [facility]

## 2020-03-28 NOTE — PROGRESS NOTES
"[facility]  Adult Nutrition  Progress Note    SUMMARY       Recommendations    Recommendation:   1. Consider changing TF to Impact Peptide at goal rate 1620 kcal, 101 gm pro, 832 mL free water. Additional 250 mL free water flushes QID or per MD.   2. WIll follow to assess nutrition status.    Goals:   1. Pt will tolerate TF.   2. TF will meet at least 85% estimated kcal/protein needs    Nutrition Goal Status: new  Communication of RD Recs: other (comment)(POC)    Reason for Assessment    Reason For Assessment: RD follow-up  Diagnosis: (pneumonia/COVID)  Relevant Medical History: HTN, DM, HLD, PAD, angioplasty, cardiac catherization  General Information Comments: Pt intubated on vent. Pt receiving TF of Isosource 1.5 at 25 ml/hr. Noted COVID +.  Nutrition Discharge Planning: d/c needs to be determined    Nutrition Risk Screen    Nutrition Risk Screen: tube feeding or parenteral nutrition    Nutrition/Diet History    Food Preferences: unable to assess  Spiritual, Cultural Beliefs, Holiness Practices, Values that Affect Care: yes  Factors Affecting Nutritional Intake: NPO, on mechanical ventilation    Anthropometrics    Temp: 98.8 °F (37.1 °C)  Height Method: Stated  Height: 5' 8" (172.7 cm)  Height (inches): 68 in  Weight Method: Bed Scale  Weight: 73.9 kg (162 lb 14.7 oz)  Weight (lb): 162.92 lb  Ideal Body Weight (IBW), Male: 154 lb  % Ideal Body Weight, Male (lb): 105.79 %  BMI (Calculated): 24.8  BMI Grade: 18.5-24.9 - normal       Lab/Procedures/Meds    Pertinent Labs Reviewed: reviewed  Pertinent Labs Comments: H/H 13.8/42.1, Na 125, CO2 18, BUN 91, Cr 3.2, eGFR 18, Glu 270, Ca 7.7, Mg 2.7, Total Pro 5.0, Alb 1.7  Pertinent Medications Reviewed: reviewed  Pertinent Medications Comments: heparin, 10%dex at 40ml/hr, fentanyl, propofol    Physical Findings/Assessment    Pilo Score: 12  Pressure Injury    Right medial Buttocks stage 1    Estimated/Assessed Needs    Weight Used For Calorie " Calculations: 73.9 kg (162 lb 14.7 oz)  Energy Calorie Requirements (kcal): 1694  Energy Need Method: [facility]  Protein Requirements: 96g (1.3g.kg)  Weight Used For Protein Calculations: 73.9 kg (162 lb 14.7 oz)     Estimated Fluid Requirement Method: RDA Method  RDA Method (mL): 1694  CHO Requirement: 188 gm      Nutrition Prescription Ordered    Current Diet Order: NPO  Current Nutrition Support Formula Ordered: Isosource 1.5  Current Nutrition Support Rate Ordered: 25 (ml)  Current Nutrition Support Frequency Ordered: mL/hr    Evaluation of Received Nutrient/Fluid Intake    Enteral Calories (kcal): 900  Enteral Protein (gm): 41  Enteral (Free Water) Fluid (mL): 458  Lipid Calories (kcals): 116 kcals  Other Calories (kcal): 326  Total Calories (kcal): 1226  % Kcal Needs: 72  % Protein Needs: 43  I/O: 1129.6/470  Energy Calories Required: not meeting needs  Protein Required: not meeting needs  Fluid Required: not meeting needs  Comments: LBM 3/22  % Intake of Estimated Energy Needs: 50 - 75 %  % Meal Intake: NPO    Nutrition Risk    Level of Risk/Frequency of Follow-up: (2xweekly)     Assessment and Plan    Acute respiratory distress syndrome (ARDS) due to COVID-19 virus  Contributing Nutrition Diagnosis  Inadequate energy intake    Related to (etiology):   intubation    Signs and Symptoms (as evidenced by):   NPO    Interventions:  Enteral nutrition    Nutrition Diagnosis Status:   Continues           Monitor and Evaluation    Food and Nutrient Intake: energy intake, food and beverage intake, enteral nutrition intake  Food and Nutrient Adminstration: diet order, enteral and parenteral nutrition administration  Knowledge/Beliefs/Attitudes: food and nutrition knowledge/skill  Physical Activity and Function: nutrition-related ADLs and IADLs  Anthropometric Measurements: weight, weight change, body mass index  Biochemical Data, Medical Tests and Procedures: electrolyte and renal panel, gastrointestinal profile,  glucose/endocrine profile, inflammatory profile, lipid profile  Nutrition-Focused Physical Findings: overall appearance     Malnutrition Assessment     NFPE not performed, patient has been screened for possible COVID-19 and has been placed on airborne and contact precautions. Patient is noted as being positive for COVID-19.    Nutrition Follow-Up    RD Follow-up?: Yes

## 2020-03-28 NOTE — PROGRESS NOTES
Pulmonary / Critical Care Medicine  Progress Note    S: Less responsive this morning. 700 cc UOP after lasix challenge. No pressors       O: VS: Temp:  [97.6 °F (36.4 °C)-98.8 °F (37.1 °C)]   Pulse:  []   Resp:  [16-35]   BP: ()/(55-90)   SpO2:  [87 %-95 %]     I/O:   Intake/Output Summary (Last 24 hours) at 3/28/2020 1005  Last data filed at 3/28/2020 0600  Gross per 24 hour   Intake 725.52 ml   Output 615 ml   Net 110.52 ml       Vent: Mode A/C / Rate 18 /  / PEEP 8 / FiO2 40%    PE Given strict isolation precautions no extensive physical exam was performed.    Sedated, comfortable  Per nursing, withdraws to pain  Synchronous with ventilator during AM rounds  Good clear urine in kendrick      Lines Central line, Day# 1    Labs Recent Labs   Lab 03/28/20  0330   WBC 13.15*   RBC 4.48*   HGB 13.9*   HCT 41.4      MCV 92   MCH 31.0   MCHC 33.6   *   K 5.1   CL 98   CO2 17*   BUN 95*   CREATININE 3.0*   MG 2.8*   ALT 27   AST 26   ALKPHOS 106   BILITOT 0.8   PROT 5.6*   ALBUMIN 1.8*         Imaging No new imaging         Micro   Blood Cultures: no growth  Sputum Culture:  none      Medications   Scheduled    atorvastatin  40 mg Oral QHS    chlorhexidine  15 mL Mouth/Throat BID    clopidogreL  75 mg Oral Daily    dextromethorphan-guaifenesin  mg  2 tablet Oral BID    famotidine (PF)  20 mg Intravenous Daily    fenofibrate  145 mg Oral Daily    heparin (porcine)  5,000 Units Subcutaneous Q12H    sodium bicarbonate  25 mEq Intravenous Once      Continuous Infusions:    dextrose 10 % in water (D10W) Stopped (03/26/20 0900)    fentanyl 100 mcg/hr (03/28/20 0200)    furosemide (LASIX) 2 mg/mL continuous infusion (non-titrating)      propofoL 5 mcg/kg/min (03/28/20 0027)        PRN   acetaminophen, albuterol, aluminum-magnesium hydroxide-simethicone, Dextrose 10% Bolus, Dextrose 10% Bolus, fentaNYL, glucagon (human recombinant), glucose, glucose, insulin aspart U-100, melatonin,  ondansetron, sodium chloride 0.9%     Enteral Feeds   10 cc hour            A/P:    NEURO  · Sedated propofol and fentanyl  · Per nursing, patient arouses to voice and withdraws from pain.    · SAT this AM    RESPIRATORY  · Acute hypoxemic respiratory failure secondary to COVID  · 40/8,  TV RR 18  Sat 92%  · Continue low tidal volume protective ventilation  · 80 IV lasix x 1 given yesterday with ~ 700 cc UOP, net even for day, + 4.5L for admit   · Attempting to get patient's volume status net negative    CARDIAC  · H/o HTN  · HLD  · PAD  · Sinus tachycardia  · Continue to hold amlodipine, lisinopril  · Metoprolol was held yesterday, HR 120s today  · Continue statin, plavix and fenofibrate  · resuming metoprolol    RENAL/  · 700 cc UOP yesterday after one dose of lasix  · Cr stable at 3.0, BUN high at 95  · K 5.1  · Will start lasix drip this morning at 10  · Renal following  · Ordered 1 dose of IV bicarb this AM    HEME/ID  · Afebrile since 3/25, WBC count slowly rising  · Azithro day 7 today, plan to stop after today  · Rocephin x 2 doses early in course  · Received course of plaquenil 3/22 - 3/26, stopped early due to hypoglycemia  · Blood cultures negative from admit    GASTRO/NUTRITION  · Tube feeds running at 10 cc/hr  · Will try to increase again today  · Decrease blood glucose checks to q6hour, if persistently higher than 200 today will add levemir  · Pepcid for prophylaxis    FEN/METABOLIC  · Hyperkalemia 2/2 KEYUR  · Hyponatremia 2/2 KEYUR  · AGMA 2/2 KEYUR  · Hypertriglyceridemia, mild  · Hyperphos 2/2 KEYUR  · Hypogylcemia, resolved    Prophylaxis: Heparin q12, pepcid daily      Code status:  Full    DISPO: Try and improve urine output today with lasix drip, follow up with renal, SHANNON Cunningham MD  [facility] / Critical Care Fellow  03/28/2020  10:05 AM

## 2020-03-29 NOTE — PROGRESS NOTES
"[facility] Critical Care Progress Note    Zhao Fischer  [id number]     03/29/2020    Interval History: 1L UOP yesterday, net negative on the lasix drip, encephalopathy persists and worsening hyperkalemia despite lasix.     ROS not obtained due to acuity of condition and intubated status    /66   Pulse 105   Temp 100.1 °F (37.8 °C) (Oral)   Resp (!) 38   Ht 5' 8" (1.727 m)   Wt 73.9 kg (162 lb 14.7 oz)   SpO2 (!) 93%   BMI 24.77 kg/m²       Intake/Output Summary (Last 24 hours) at 3/29/2020 0732  Last data filed at 3/29/2020 0659  Gross per 24 hour   Intake 368 ml   Output 1001 ml   Net -633 ml       Physical exam is limited given strict isolation precautions  Patient is intubated and sedated  he is synchronous with ventilator  No seizure like activity noted  Mental status per nursing: minimally responsive, withdraws to pain  Extremities are warm  Evidence of soft tissue edema: no    Labs and imaging have been reviewed.    New and pertinent results: K 5.9,     COVID testing status: positive    Assesment and Plan:    74 y.o. year old male admitted to ICU for concern for acute pulmonary failure secondary to novel coronavirus infection.    NEURO  · Sedated with precedex, appears comfortable  · SBT today    RESPIRATORY  · Acute hypoxemic respiratory failure secondary to COVID  · 50/8,  TV RR 18  Sat 94%  · Continue low tidal volume protective ventilation  · Good UOP overnight with lasix drip, however remains encephalopathic and now with worsening hyperkalemia   · Will ask nephrology for HD today    CARDIAC  · H/o HTN  · HLD  · PAD  · Sinus tachycardia, improved  · Continue to hold amlodipine, lisinopril  · Metoprolol was held yesterday given soft BP  · Continue statin, plavix and fenofibrate    RENAL/  · 1000 cc UOP yesterday after lasix gtt  · Cr rising at 3.4, BUN high at 103 and rising  · K 5.9, will shift x 1 with insulin and dextrose  · Continue lasix drip, ask nephro for HD " today  · Renal following    HEME/ID  · Afebrile since 3/25, WBC count stable  · Azithro x 7 days complete  · Rocephin x 2 doses early in course  · Received course of plaquenil 3/22 - 3/26, stopped early due to hypoglycemia  · Blood cultures negative from admit    GASTRO/NUTRITION  · Tube feeds running at 25 cc/hr  · Added levemir overnight  · Pepcid for prophylaxis    FEN/METABOLIC  · Hyperkalemia 2/2 KEYUR - shifting today, see KEYUR  · Hyponatremia 2/2 KEYUR - stable  · AGMA 2/2 KEYUR - stable  · Hypertriglyceridemia, mild  · Hyperphos 2/2 KEYUR  · Hyperglycemia - added detemir, q6hour glucose checks, SSI     Prophylaxis: famotidine, heparin      Code status:  Full    DISPO: Discuss with renal re: HD, shift K this AM, wean vent, SBT today    Luke Cunningham MD  [facility] and Critical Care Fellow  Pager: [phone]  Cell: [phone]

## 2020-03-29 NOTE — PROGRESS NOTES
Patient seen and examined by me. I agree with the trainee's separate note except as modified.     No major overnight events reported.     I/O: -580cc (+3.9L)  Tmax 100.3, HR 100s, MAP 83. 92% on 50% and 8 PEEP.     WBC 13 to 9  Creatinine 3.0 to 3.4   BUN 95 to 103  K up to 5.9    On my exam: sedated, on the vent      Impression: COVID-19 with respiratory failure and KEYUR    Recommendations:   -cont lasix gtt  -starting RRT today, hopefully to improve uremia  -off abx  -completed HCQ  -on SBT and doing well, but mental status precludes extubation at this point.     Critical care time (30min) spent personally by me on the following activities: development of treatment plan with patient or surrogate and bedside caregivers, discussions with consultants, evaluation of patient's response to treatment, examination of patient, ordering and performing treatments and interventions, ordering and review of laboratory studies, ordering and review of radiographic studies, pulse oximetry, re-evaluation of patient's condition. This critical care time did not overlap with that of any other provider or involve time for any procedures.

## 2020-03-29 NOTE — NURSING
Dr. Fofana notified of pts urine output of 40ml/hr. In report it was mentioned that pt would have lasix titrated based off of urine output of 100ml/hr. Okay to leave drip as is per provider. Urine output adequate but marginal.

## 2020-03-29 NOTE — PROGRESS NOTES
[facility] Progress Note    Subjective:    Zhao Fischer is a 74 y.o.  male who is being followed by the [facility] service at [facility] for KEYUR.  Patient is intubated, sedated and COVID 19 +   Objective:   Last 24 Hour Vital Signs:  BP  Min: 88/57  Max: 181/86  Temp  Av °F (37.8 °C)  Min: 99.8 °F (37.7 °C)  Max: 100.3 °F (37.9 °C)  Pulse  Av.1  Min: 105  Max: 141  Resp  Av.6  Min: 16  Max: 64  SpO2  Av %  Min: 83 %  Max: 96 %  I/O last 3 completed shifts:  In: 913.5 [I.V.:738.5; NG/GT:175]  Out: 1231 [Urine:1231]    Physical Examination:  COVID POSITIVE  DEFER EXAM      Laboratory:  Laboratory Data Reviewed: yes    Microbiology Data Reviewed: yes    Radiology Data Reviewed: yes    Current Medications:     Infusions:   dexmedetomidine (PRECEDEX) infusion 0.2 mcg/kg/hr (20)    fentanyl 50 mcg/hr (20)    furosemide (LASIX) 2 mg/mL continuous infusion (non-titrating) 10 mg/hr (20)    propofoL 5 mcg/kg/min (20)        Scheduled:   atorvastatin  40 mg Oral QHS    chlorhexidine  15 mL Mouth/Throat BID    clopidogreL  75 mg Oral Daily    famotidine (PF)  20 mg Intravenous Daily    fenofibrate  145 mg Oral Daily    heparin (porcine)  5,000 Units Subcutaneous Q12H    insulin detemir U-100  10 Units Subcutaneous QHS    metoprolol tartrate  25 mg Oral BID        PRN:  acetaminophen, albuterol, aluminum-magnesium hydroxide-simethicone, Dextrose 10% Bolus, Dextrose 10% Bolus, fentaNYL, glucagon (human recombinant), glucose, glucose, insulin aspart U-100, melatonin, ondansetron, sodium chloride 0.9%      Assessment and Plan:    Zhao Fischer is a 74 y.o.male with   #) Oliguric KEYUR   - Baseline Cr around 1.1, worsened to 3.2 today  - COVID 19 positive  - Urea Na: < 20, FeNA < 0.2, BUN/Cr: >20:1  - Dehydration vs hypotension (ATN) vs post renal  - s/p 500 cc bolus   - Patient is Net +3.7 L.  cc, Lasix 60 IV x Once at  12 noon, will monitor response, may need another dose.  - Will continue to monitor  - Renally dose all medications, avoid contrast and phos based enemas.  2020  - Cr stable at 3.2, UOP: 475 ml  - Lasix 80 mg IV and reaccess  2020  - Cr improved a little 3.0  - UOP: 680 cc  - Net 3.9+  - On Lasix gtt (10 mg/hr)     #) Hyponatremia  - Na: 127, down from 131  - We recommend getting Serum osmolality, Urine osmolality and urine electrolytes (Na, K, Cr and Cl)  2020  - no labs yet, will order  2020  - Na: 128     #) Anion Gap metabolic Acidosis  - HCO3: 18, A, Corrected A.25  - 1 amp of NaHCO3 once.   2020  - 1 amp of NaHCo3  2020  - CO2: 17     Thank you for allowing us in taking care of this patient. Please call us if you have any questions regarding this patient.         Darline Dhaliwal MD  [facility] Nephrology [facility]  [phone]     If after 5pm please forward any questions to the [facility] Nephrology Fellow/Attending on call.

## 2020-03-29 NOTE — SUBJECTIVE & OBJECTIVE
Interval History: intubated, blood glucose improved, continue low dose levemir and sliding scale.  Worsening renal function with urinary output of 40ml/hr continue present drip rate. Appreciates nephrology rec's- IV lasix for now  Patient is COVID positive    Review of Systems   Unable to perform ROS: Intubated     Objective:     Vital Signs (Most Recent):  Temp: 100.1 °F (37.8 °C) (03/29/20 0305)  Pulse: 105 (03/29/20 0600)  Resp: (!) 38 (03/29/20 0600)  BP: 105/66 (03/29/20 0600)  SpO2: (!) 93 % (03/29/20 0600) Vital Signs (24h Range):  Temp:  [99.8 °F (37.7 °C)-100.3 °F (37.9 °C)] 100.1 °F (37.8 °C)  Pulse:  [105-141] 105  Resp:  [16-64] 38  SpO2:  [83 %-96 %] 93 %  BP: ()/(55-86) 105/66     Weight: 73.9 kg (162 lb 14.7 oz)  Body mass index is 24.77 kg/m².    Intake/Output Summary (Last 24 hours) at 3/29/2020 0836  Last data filed at 3/29/2020 0659  Gross per 24 hour   Intake 416.75 ml   Output 1001 ml   Net -584.25 ml      Physical Exam   Constitutional: He appears well-developed. No distress.   Pulmonary/Chest: Tachypnea noted. No respiratory distress.   intubated   Nursing note and vitals reviewed.      Significant Labs:   ABGs:   No results for input(s): PH, PCO2, HCO3, POCSATURATED, BE, TOTALHB, COHB, METHB, O2HB, POCFIO2 in the last 48 hours.  Blood Culture: No results for input(s): LABBLOO in the last 48 hours.  BMP:   Recent Labs   Lab 03/29/20  0551   *   *   K 5.9*   CL 98   CO2 20*   *   CREATININE 3.4*   CALCIUM 8.2*   MG 3.2*     CBC:   Recent Labs   Lab 03/28/20  0330 03/29/20  0551   WBC 13.15* 9.37   HGB 13.9* 13.1*   HCT 41.4 38.9*    305     CMP:   Recent Labs   Lab 03/28/20  0330 03/29/20  0551   * 129*   K 5.1 5.9*   CL 98 98   CO2 17* 20*   * 275*   BUN 95* 103*   CREATININE 3.0* 3.4*   CALCIUM 8.2* 8.2*   PROT 5.6* 5.6*   ALBUMIN 1.8* 1.5*   BILITOT 0.8 0.8   ALKPHOS 106 97   AST 26 32   ALT 27 24   ANIONGAP 13 11   EGFRNONAA 20* 17*     Lactic  Acid: No results for input(s): LACTATE in the last 48 hours.  TSH: No results for input(s): TSH in the last 4320 hours.  Urine Culture: No results for input(s): LABURIN in the last 48 hours.  Urine Studies: No results for input(s): COLORU, APPEARANCEUA, PHUR, SPECGRAV, PROTEINUA, GLUCUA, KETONESU, BILIRUBINUA, OCCULTUA, NITRITE, UROBILINOGEN, LEUKOCYTESUR, RBCUA, WBCUA, BACTERIA, SQUAMEPITHEL, HYALINECASTS in the last 48 hours.    Invalid input(s): WRIGHTSUR    Significant Imaging: I have reviewed all pertinent imaging results/findings within the past 24 hours.

## 2020-03-29 NOTE — PROGRESS NOTES
[facility] Medicine  Progress Note    Patient Name: Zhao Fischer  MRN: [id number]  Patient Class: IP- Inpatient   Admission Date: 3/21/2020  Length of Stay: 6 days  Attending Physician: Rafaela Romero*  Primary Care Provider: Mannie Russell MD        Subjective:     Principal Problem:Pneumonia due to severe acute respiratory syndrome coronavirus 2 (SARS-CoV-2)        HPI:  Zhao Fischer is a 74 year old white man with hypertension, hyperlipidemia, diabetes mellitus type 2, peripheral artery disease, arteriosclerotic cardiovascular disease. He lives in [place], [place]. He has been  since he was 16 years old. His primary care physician is Dr. Mannie Russell.    He contacted Dr. Russell on 3/16/2020 to request testing for COVID-19 due to nonproductive cough, shortness of breath, and fever for the past day, but he could not get tested due to limited outpatient testing. He was prescribed levofloxacin 500 mg daily.    He presented to [facility] on 3/21/2020 with persistent symptoms, including diarrhea, which is common with COVID-19 infection, and poor appetite. He reported that his wife had similar symptoms. He was hypoxic, with oxygen saturation of 88%. Chest X-ray showed interstitial lung opacities. WBC count was low at 2780. BNP, lactate, and procalcitonin were low. CRP was elevated at 154 mg/L. COVID-19 testing has been liberal in the emergency department (criteria of cough alone is enough) so he was tested. He was not given anything other than supplemental oxygen in the emergency department. He was admitted to [facility].     Overview/Hospital Course:  He was put on hydroxychloroquine and azithromycin. His hypoxia worsened overnight between 3/22/2020 and 3/23/2020. Pulmonology was consulted.   3/24 COVID 19 positive and family updated, questions and concerns addressed.consult palliative care    Interval  History: intubated, blood glucose improved, continue low dose levemir and sliding scale.  Worsening renal function with urinary output of 40ml/hr continue present drip rate. Appreciates nephrology rec's- IV lasix for now  Patient is COVID positive    Review of Systems   Unable to perform ROS: Intubated     Objective:     Vital Signs (Most Recent):  Temp: 100.1 °F (37.8 °C) (03/29/20 0305)  Pulse: 105 (03/29/20 0600)  Resp: (!) 38 (03/29/20 0600)  BP: 105/66 (03/29/20 0600)  SpO2: (!) 93 % (03/29/20 0600) Vital Signs (24h Range):  Temp:  [99.8 °F (37.7 °C)-100.3 °F (37.9 °C)] 100.1 °F (37.8 °C)  Pulse:  [105-141] 105  Resp:  [16-64] 38  SpO2:  [83 %-96 %] 93 %  BP: ()/(55-86) 105/66     Weight: 73.9 kg (162 lb 14.7 oz)  Body mass index is 24.77 kg/m².    Intake/Output Summary (Last 24 hours) at 3/29/2020 0836  Last data filed at 3/29/2020 0659  Gross per 24 hour   Intake 416.75 ml   Output 1001 ml   Net -584.25 ml      Physical Exam   Constitutional: He appears well-developed. No distress.   Pulmonary/Chest: Tachypnea noted. No respiratory distress.   intubated   Nursing note and vitals reviewed.      Significant Labs:   ABGs:   No results for input(s): PH, PCO2, HCO3, POCSATURATED, BE, TOTALHB, COHB, METHB, O2HB, POCFIO2 in the last 48 hours.  Blood Culture: No results for input(s): LABBLOO in the last 48 hours.  BMP:   Recent Labs   Lab 03/29/20  0551   *   *   K 5.9*   CL 98   CO2 20*   *   CREATININE 3.4*   CALCIUM 8.2*   MG 3.2*     CBC:   Recent Labs   Lab 03/28/20  0330 03/29/20  0551   WBC 13.15* 9.37   HGB 13.9* 13.1*   HCT 41.4 38.9*    305     CMP:   Recent Labs   Lab 03/28/20  0330 03/29/20  0551   * 129*   K 5.1 5.9*   CL 98 98   CO2 17* 20*   * 275*   BUN 95* 103*   CREATININE 3.0* 3.4*   CALCIUM 8.2* 8.2*   PROT 5.6* 5.6*   ALBUMIN 1.8* 1.5*   BILITOT 0.8 0.8   ALKPHOS 106 97   AST 26 32   ALT 27 24   ANIONGAP 13 11   EGFRNONAA 20* 17*     Lactic Acid: No  results for input(s): LACTATE in the last 48 hours.  TSH: No results for input(s): TSH in the last 4320 hours.  Urine Culture: No results for input(s): LABURIN in the last 48 hours.  Urine Studies: No results for input(s): COLORU, APPEARANCEUA, PHUR, SPECGRAV, PROTEINUA, GLUCUA, KETONESU, BILIRUBINUA, OCCULTUA, NITRITE, UROBILINOGEN, LEUKOCYTESUR, RBCUA, WBCUA, BACTERIA, SQUAMEPITHEL, HYALINECASTS in the last 48 hours.    Invalid input(s): WRIGHTSUR    Significant Imaging: I have reviewed all pertinent imaging results/findings within the past 24 hours.      Assessment/Plan:      * Pneumonia due to severe acute respiratory syndrome coronavirus 2 (SARS-CoV-2)  Acute Respiratory Disease due to Covid-19 Virus  Acute respiratory failure with hypoxia    Appreciate Infectious Disease and Pulmonology.   COVID 19 positive- per ID - ydroxychloroquine, azithromycin & ceftriaxone and monitor QTc  Consult palliative care for support    Hypoglycemia  Likely due to hydroxychloroquine  continue D10 and titrate TF to goal      Elevated LFTs  monitor      KEYUR (acute kidney injury)  Avoid nephrotoxic agens  Renally dose all meds  Monitor  Consult nephrology-Appreciates nephrology rec's- IV lasix for now, may likely require HD given disease trajectory   Strict input/output      Acute respiratory failure with hypoxia  See primary problem.    Essential hypertension  ASCVD (arteriosclerotic cardiovascular disease)  Hyperlipidemia   PAD (peripheral artery disease)  Continue atorvastatin 40 mg daily, clopidrogel 75 mg daily.  Hold Metoprolol succinate, amlodipine   Hold lisinopril due to worsening renal function    Type 2 diabetes mellitus with diabetic peripheral angiopathy without gangrene, without long-term current use of insulin  Hypoglycemia  Likely due to plaquenil- d/c per ID  Hold home glimepiride, empagliflozin.   levemir   insulin aspart sliding scale.      VTE Risk Mitigation (From admission, onward)         Ordered     heparin  (porcine) injection 5,000 Units  Every 12 hours      03/23/20 1357     IP VTE HIGH RISK PATIENT  Once      03/21/20 2227     Place sequential compression device  Until discontinued      03/21/20 2227                Critical care time spent on the evaluation and treatment of severe organ dysfunction, review of pertinent labs and imaging studies, discussions with consulting providers and discussions with patient/family: 35 minutes.      Rafaela Romero MD  Department of Hospital Medicine   [facility]

## 2020-03-29 NOTE — NURSING
Sat performed earlier today, pt failed due to hr 140's, rr 40's, desating to 85%. Pt opens eyes spontaneously and withdraws from pain but does not follow any commands. sbt not performed, unable to wean vent, pt +5L, attempting to diurese with lasix gtt.

## 2020-03-29 NOTE — PLAN OF CARE
- Ordered BiCitra 30 cc TID via tube for acidosis  - Continue lasix gtt  - Hyperkalemia:   - Will initiate CRRT/CVVHD 4 hours, TFR 5.0 L /hr, Orders in    Full progress note to follow    Darline Dhaliwal MD  [facility]

## 2020-03-30 PROBLEM — R79.89 ELEVATED LFTS: Status: RESOLVED | Noted: 2020-01-01 | Resolved: 2020-01-01

## 2020-03-30 PROBLEM — E16.2 HYPOGLYCEMIA: Status: RESOLVED | Noted: 2020-01-01 | Resolved: 2020-01-01

## 2020-03-30 NOTE — PROGRESS NOTES
[facility]  Progress Note    Patient Name: Zhao Fischer  MRN: [id number]  Patient Class: IP- Inpatient   Admission Date: 3/21/2020  Length of Stay: 7 days  Attending Physician: Rafaela Romero*  Primary Care Provider: Mannie Russell MD        Subjective:     Principal Problem:Pneumonia due to severe acute respiratory syndrome coronavirus 2 (SARS-CoV-2)        HPI:  Zhao Fischer is a 74 year old white man with hypertension, hyperlipidemia, diabetes mellitus type 2, peripheral artery disease, arteriosclerotic cardiovascular disease. He lives in [place], [place]. He has been  since he was 16 years old. His primary care physician is Dr. Mannie Russell.    He contacted Dr. Russell on 3/16/2020 to request testing for COVID-19 due to nonproductive cough, shortness of breath, and fever for the past day, but he could not get tested due to limited outpatient testing. He was prescribed levofloxacin 500 mg daily.    He presented to [facility] on 3/21/2020 with persistent symptoms, including diarrhea, which is common with COVID-19 infection, and poor appetite. He reported that his wife had similar symptoms. He was hypoxic, with oxygen saturation of 88%. Chest X-ray showed interstitial lung opacities. WBC count was low at 2780. BNP, lactate, and procalcitonin were low. CRP was elevated at 154 mg/L. COVID-19 testing has been liberal in the emergency department (criteria of cough alone is enough) so he was tested. He was not given anything other than supplemental oxygen in the emergency department. He was admitted to [facility].     Overview/Hospital Course:  He was put on hydroxychloroquine and azithromycin. His hypoxia worsened overnight between 3/22/2020 and 3/23/2020. Pulmonology was consulted and he was intubated. COVID-19 result was reported positive on 3/24/2020. He developed acute kidney injury on 3/24/2020.  Palliative Care was consulted. Hydroxychloroquine was stopped early due to hypoglycemia. Nephrology was consulted on 3/26/2020. A left internal jugular trialysis catheter was placed on 3/27/2020. He finished a course of azithromycin.     Interval History: intubated, febrile overnight blood glucose still elevated, increase levemir and add scheduled Aspart plus low dose SSI.  Diuresing more continue lasix drip. Appreciates nephrology   Patient is COVID positive    Review of Systems   Unable to perform ROS: Intubated     Objective:     Vital Signs (Most Recent):  Temp: (!) 100.7 °F (38.2 °C) (03/30/20 1207)  Pulse: (!) 122 (03/30/20 1230)  Resp: (!) 25 (03/30/20 1230)  BP: (!) 175/95 (03/30/20 1230)  SpO2: (!) 94 % (03/30/20 1230) Vital Signs (24h Range):  Temp:  [96.8 °F (36 °C)-100.7 °F (38.2 °C)] 100.7 °F (38.2 °C)  Pulse:  [] 122  Resp:  [18-69] 25  SpO2:  [73 %-100 %] 94 %  BP: ()/() 175/95     Weight: 73.9 kg (162 lb 14.7 oz)  Body mass index is 24.77 kg/m².    Intake/Output Summary (Last 24 hours) at 3/30/2020 1258  Last data filed at 3/30/2020 1200  Gross per 24 hour   Intake 1609.74 ml   Output 2820 ml   Net -1210.26 ml      Physical Exam   Constitutional: He appears well-developed. No distress.   Pulmonary/Chest: Tachypnea noted. No respiratory distress.   intubated   Nursing note and vitals reviewed.      Significant Labs:   ABGs:   No results for input(s): PH, PCO2, HCO3, POCSATURATED, BE, TOTALHB, COHB, METHB, O2HB, POCFIO2 in the last 48 hours.  Blood Culture: No results for input(s): LABBLOO in the last 48 hours.  BMP:   Recent Labs   Lab 03/30/20  1145   *      K 4.9      CO2 27   BUN 99*   CREATININE 2.4*   CALCIUM 8.3*   MG 2.5     CBC:   Recent Labs   Lab 03/29/20  0551   WBC 9.37   HGB 13.1*   HCT 38.9*        CMP:   Recent Labs   Lab 03/29/20  0551 03/29/20 2033 03/30/20  0304 03/30/20  1145   * 134*  134* 136 137   K 5.9* 4.6  4.6 4.8 4.9   CL  98 103  103 102 100   CO2 20* 21*  21* 24 27   * 354*  354* 308* 467*   * 102*  102* 89* 99*   CREATININE 3.4* 2.4*  2.4* 2.2* 2.4*   CALCIUM 8.2* 7.7*  7.7* 8.1* 8.3*   PROT 5.6*  --   --   --    ALBUMIN 1.5* 1.3*  1.3* 1.4* 1.4*   BILITOT 0.8  --   --   --    ALKPHOS 97  --   --   --    AST 32  --   --   --    ALT 24  --   --   --    ANIONGAP 11 10  10 10 10   EGFRNONAA 17* 26*  26* 28* 26*     Lactic Acid: No results for input(s): LACTATE in the last 48 hours.  TSH: No results for input(s): TSH in the last 4320 hours.  Urine Culture: No results for input(s): LABURIN in the last 48 hours.  Urine Studies: No results for input(s): COLORU, APPEARANCEUA, PHUR, SPECGRAV, PROTEINUA, GLUCUA, KETONESU, BILIRUBINUA, OCCULTUA, NITRITE, UROBILINOGEN, LEUKOCYTESUR, RBCUA, WBCUA, BACTERIA, SQUAMEPITHEL, HYALINECASTS in the last 48 hours.    Invalid input(s): MYRA    Significant Imaging: I have reviewed all pertinent imaging results/findings within the past 24 hours.      Assessment/Plan:      * Pneumonia due to severe acute respiratory syndrome coronavirus 2 (SARS-CoV-2)  Acute Respiratory Disease due to Covid-19 Virus  Acute respiratory failure with hypoxia    Appreciate Infectious Disease and Pulmonology.   COVID 19 positive- per ID - ydroxychloroquine, azithromycin & ceftriaxone and monitor QTc  Consult palliative care for support    Hypoglycemia  Likely due to hydroxychloroquine  continue D10 and titrate TF to goal      Elevated LFTs  monitor      KEYUR (acute kidney injury)  Avoid nephrotoxic agens  Renally dose all meds  Monitor  Consult nephrology-Appreciates nephrology rec's- IV lasix for now, may likely require HD given disease trajectory   Strict input/output      Acute respiratory failure with hypoxia  See primary problem.    Essential hypertension  ASCVD (arteriosclerotic cardiovascular disease)  Hyperlipidemia   PAD (peripheral artery disease)  Continue atorvastatin 40 mg daily,  clopidrogel 75 mg daily.  Hold Metoprolol succinate, amlodipine   Hold lisinopril due to worsening renal function    Type 2 diabetes mellitus with diabetic peripheral angiopathy without gangrene, without long-term current use of insulin  Hypoglycemia  Likely due to plaquenil- d/c per ID  Hold home glimepiride, empagliflozin.   levemir   insulin aspart sliding scale.      VTE Risk Mitigation (From admission, onward)         Ordered     heparin, porcine (PF) 100 unit/mL injection flush 200 Units  As needed (PRN)      03/29/20 1459     heparin 25,000 units in dextrose 5% 250 mL (100 units/mL) infusion (heparin infusion - NO NOMOGRAM)  Continuous      03/29/20 1332     heparin (porcine) injection 5,000 Units  Every 12 hours      03/23/20 1357     IP VTE HIGH RISK PATIENT  Once      03/21/20 2227     Place sequential compression device  Until discontinued      03/21/20 2227                Critical care time spent on the evaluation and treatment of severe organ dysfunction, review of pertinent labs and imaging studies, discussions with consulting providers and discussions with patient/family: 35 minutes.      Rafaela Romero MD  Department of Hospital Medicine   [facility]

## 2020-03-30 NOTE — PROGRESS NOTES
[facility] Progress Note    Subjective:      Zhao Fischer is a 74 y.o.  male who is being followed by the [facility] service at [facility] for KEYUR.  Intubated, Sedated and COVID positive  Will Continue daily CRRT.      Objective:   Last 24 Hour Vital Signs:  BP  Min: 84/60  Max: 204/96  Temp  Av °F (37.2 °C)  Min: 96.8 °F (36 °C)  Max: 100.7 °F (38.2 °C)  Pulse  Av.8  Min: 78  Max: 139  Resp  Av.3  Min: 20  Max: 69  SpO2  Av.5 %  Min: 83 %  Max: 100 %  I/O last 3 completed shifts:  In: 1842.2 [I.V.:792.2; NG/GT:1050]  Out: 2880 [Urine:2355; Other:525]    Physical Examination:  EXAM Deffered  COVID Positive      Laboratory:  Laboratory Data Reviewed: yes    Microbiology Data Reviewed: yes    Radiology Data Reviewed: yes    Current Medications:     Infusions:   dexmedetomidine (PRECEDEX) infusion 0.6 mcg/kg/hr (20 1015)    fentanyl Stopped (20 0737)    furosemide (LASIX) 2 mg/mL continuous infusion (non-titrating) 10 mg/hr (20 2151)    heparin (porcine) in 5 % dex Stopped (20 0100)        Scheduled:   acetaminophen  650 mg Oral Q6H    atorvastatin  40 mg Oral QHS    carvediloL  6.25 mg Oral BID    chlorhexidine  15 mL Mouth/Throat BID    clopidogreL  75 mg Oral Daily    famotidine (PF)  20 mg Intravenous Daily    fenofibrate  145 mg Oral Daily    heparin (porcine)  5,000 Units Subcutaneous Q12H    insulin aspart U-100  5 Units Subcutaneous Q6H    insulin detemir U-100  25 Units Subcutaneous QHS    sodium citrate-citric acid 500-334 mg/5 ml  30 mL Oral TID        PRN:  acetaminophen, albuterol, aluminum-magnesium hydroxide-simethicone, Dextrose 10% Bolus, Dextrose 10% Bolus, fentaNYL, glucagon (human recombinant), glucose, glucose, heparin, porcine (PF), insulin aspart U-100, ondansetron, sodium chloride 0.9%      Assessment and Plan:    Zhao Fischer is a 74 y.o.male with   #) Oliguric KEYUR   - Baseline Cr around 1.1, worsened to  3.2 today  - COVID 19 positive  - Urea Na: < 20, FeNA < 0.2, BUN/Cr: >20:1  - Dehydration vs hypotension (ATN) vs post renal  - s/p 500 cc bolus   - Patient is Net +3.7 L.  cc, Lasix 60 IV x Once at 12 noon, will monitor response, may need another dose.  - Will continue to monitor  - Renally dose all medications, avoid contrast and phos based enemas.  2020  - Cr stable at 3.2, UOP: 475 ml  - Lasix 80 mg IV and reaccess  2020  - Cr improved a little 3.0  - UOP: 680 cc  - Net 3.9+  - On Lasix gtt (10 mg/hr)   2020  - Cr: 3.4 > 2.4  - UOP: 1076 > 1830 ml, Net: +2.5L  - On Lasix gtt (10 mg/hr)  - On high therapy flow CRRT regimen, Will continue daily CRRT ( 4-hours, Therapy flow rate: 5.0 L/hr and -300, Heparin gtt 500-750 units/hr if still clotting can try upto 1000 units, Monitor PTT).    #) Hyponatremia  - Na: 127, down from 131  - We recommend getting Serum osmolality, Urine osmolality and urine electrolytes (Na, K, Cr and Cl)  2020  - no labs yet, will order  2020  - Na: 128  2020  - Na: 137- Resolved     #) Anion Gap metabolic Acidosis  - HCO3: 18, A, Corrected A.25  - 1 amp of NaHCO3 once.   2020  - 1 amp of NaHCo3  2020  - CO2: 17  2020  - CO2: 24- Resolved     Thank you for allowing us in taking care of this patient. Please call us if you have any questions regarding this patient.         Darline Dhaliwal MD  [facility]  [phone]     If after 5pm please forward any questions to the [facility] Fellow/Attending on call.

## 2020-03-30 NOTE — SUBJECTIVE & OBJECTIVE
Interval History: intubated, febrile overnight blood glucose still elevated, increase levemir and add scheduled Aspart plus low dose SSI.  Diuresing more continue lasix drip. Appreciates nephrology   Patient is COVID positive    Review of Systems   Unable to perform ROS: Intubated     Objective:     Vital Signs (Most Recent):  Temp: (!) 100.7 °F (38.2 °C) (03/30/20 1207)  Pulse: (!) 122 (03/30/20 1230)  Resp: (!) 25 (03/30/20 1230)  BP: (!) 175/95 (03/30/20 1230)  SpO2: (!) 94 % (03/30/20 1230) Vital Signs (24h Range):  Temp:  [96.8 °F (36 °C)-100.7 °F (38.2 °C)] 100.7 °F (38.2 °C)  Pulse:  [] 122  Resp:  [18-69] 25  SpO2:  [73 %-100 %] 94 %  BP: ()/() 175/95     Weight: 73.9 kg (162 lb 14.7 oz)  Body mass index is 24.77 kg/m².    Intake/Output Summary (Last 24 hours) at 3/30/2020 1258  Last data filed at 3/30/2020 1200  Gross per 24 hour   Intake 1609.74 ml   Output 2820 ml   Net -1210.26 ml      Physical Exam   Constitutional: He appears well-developed. No distress.   Pulmonary/Chest: Tachypnea noted. No respiratory distress.   intubated   Nursing note and vitals reviewed.      Significant Labs:   ABGs:   No results for input(s): PH, PCO2, HCO3, POCSATURATED, BE, TOTALHB, COHB, METHB, O2HB, POCFIO2 in the last 48 hours.  Blood Culture: No results for input(s): LABBLOO in the last 48 hours.  BMP:   Recent Labs   Lab 03/30/20  1145   *      K 4.9      CO2 27   BUN 99*   CREATININE 2.4*   CALCIUM 8.3*   MG 2.5     CBC:   Recent Labs   Lab 03/29/20  0551   WBC 9.37   HGB 13.1*   HCT 38.9*        CMP:   Recent Labs   Lab 03/29/20  0551 03/29/20 2033 03/30/20  0304 03/30/20  1145   * 134*  134* 136 137   K 5.9* 4.6  4.6 4.8 4.9   CL 98 103  103 102 100   CO2 20* 21*  21* 24 27   * 354*  354* 308* 467*   * 102*  102* 89* 99*   CREATININE 3.4* 2.4*  2.4* 2.2* 2.4*   CALCIUM 8.2* 7.7*  7.7* 8.1* 8.3*   PROT 5.6*  --   --   --    ALBUMIN 1.5* 1.3*   1.3* 1.4* 1.4*   BILITOT 0.8  --   --   --    ALKPHOS 97  --   --   --    AST 32  --   --   --    ALT 24  --   --   --    ANIONGAP 11 10  10 10 10   EGFRNONAA 17* 26*  26* 28* 26*     Lactic Acid: No results for input(s): LACTATE in the last 48 hours.  TSH: No results for input(s): TSH in the last 4320 hours.  Urine Culture: No results for input(s): LABURIN in the last 48 hours.  Urine Studies: No results for input(s): COLORU, APPEARANCEUA, PHUR, SPECGRAV, PROTEINUA, GLUCUA, KETONESU, BILIRUBINUA, OCCULTUA, NITRITE, UROBILINOGEN, LEUKOCYTESUR, RBCUA, WBCUA, BACTERIA, SQUAMEPITHEL, HYALINECASTS in the last 48 hours.    Invalid input(s): MYRA    Significant Imaging: I have reviewed all pertinent imaging results/findings within the past 24 hours.

## 2020-03-30 NOTE — PLAN OF CARE
Pt opens eye but does not follow commands/track. Afebrile overnight. ST, BP elevated when agitated.  Sats remained >90 on vent. UOP adequate with kendrick. CRRT ran for four hours per order. Pt no longer connected and venous line hep locked with arterial instilled with cathflo. Coughing fits minimal overnight. Fentanyl and precedex gtt present.  Airborne, droplet, and contact precautions in place. Frequent checks for safety done during shift. Will report to oncoming RN.

## 2020-03-30 NOTE — PROGRESS NOTES
Pt seen and examined with Pulmonary/Critical Care team. Critical Care time was spent validating the history and physical exam, reviewing the lab and imaging results, and discussing the care of the patient with the bedside nurse. The following additional comments are made:    High airways resistance, poor pt-ventilator synchrony. KEYUR on CRRT. CRRT circuit pressures go up when pt asynchronous so we have had to uptitrate his sedation. Poor glycemic control.  Now on insulin gtt.     Critical Care time 30 minutes    Brigido Lewis MD  Phone [phone]

## 2020-03-30 NOTE — ASSESSMENT & PLAN NOTE
Acute respiratory distress syndrome (ARDS) due to COVID-19 virus  Acute respiratory failure with hypoxia  Appreciate Infectious Disease and Pulmonology. Treating with hydroxychloroquine, azithromycin, ceftriaxone.

## 2020-03-30 NOTE — NURSING
Riseback pt and heparinized line at 1930. Informed  of CRRT issues and having to stop. Orders to restart and if issues still occur to stop to attempt to change out lines in AM.

## 2020-03-30 NOTE — EICU
Intervention Initiated From:  Bedside via phone call    Hood Communicated with Bedside Nurse regarding:  Other--bedside requesting order for Cath андрей for X1 lumen of trialysis catheter    Doctor Notified:  Yes--Dr Bird via [employer].

## 2020-03-30 NOTE — NURSING
0100 Pt CRRT clotted at 4 hour michaelle and able to riseback to d/c CRRT according to orders. Venous line heparinized. Unable to heparin lock arterial line due to being unable to pull blood back..  0330 Instilled cathflo in arterial line.  0400 Arterial line still unable to pull back. Leaving cathflo per protocol.

## 2020-03-30 NOTE — PROGRESS NOTES
[facility] Pulmonary/Critical Care Resident Progress Note    [facility] [place] [facility]  Attending Physician: Rafaela Khan-Brennan*    Subjective:      100.3F overnight, otherwise normothermic. Still making urine, on lasix gtt. SBT this morning. FiO2 40, PEEP 8. Satting 96%. Will come down on PEEP this morning as well to see if he can tolerate extubation. Opening eyes to commands.     Objective:     Last 24 Hour Vital Signs:  BP  Min: 84/60  Max: 204/96  Temp  Av.8 °F (37.1 °C)  Min: 96.8 °F (36 °C)  Max: 100.3 °F (37.9 °C)  Pulse  Av.8  Min: 78  Max: 139  Resp  Av.7  Min: 18  Max: 69  SpO2  Av.8 %  Min: 73 %  Max: 100 %  I/O last 3 completed shifts:  In: 1487.3 [I.V.:437.3; NG/GT:1050]  Out: 2880 [Urine:2355; Other:525]    Physical Examination:  Gen: intubated, sedated (off this morning on SBT, opening eyes)  HEENT: NCAT, MMM, JVP ~5cm  CV: RRR, S1/S2 appreciated, no murmur gallop or rubs  Resp: Coarse breath sounds bilaterally, no crackles appeciated  Abdomen: Soft, NT, ND, +BS  Ext: 2+ distal pulses, no edema appreciated  Skin: Warm, dry, no rashes appreciated  Psych: deferred  Neuro: Following 1 step commands while off sedation    Laboratory:  Trended Lab Data:  Recent Labs   Lab 20  0536 20  0330 20  0551   WBC 9.83 13.15* 9.37   HGB 13.8* 13.9* 13.1*   HCT 42.1 41.4 38.9*    262 305       Recent Labs   Lab 20  0551 20  0304   * 134*  134* 136   K 5.9* 4.6  4.6 4.8   CL 98 103  103 102   CO2 20* 21*  21* 24   * 102*  102* 89*   CREATININE 3.4* 2.4*  2.4* 2.2*   * 354*  354* 308*   CALCIUM 8.2* 7.7*  7.7* 8.1*   MG 3.2* 2.6 2.6   PHOS 4.3 3.3  3.3 3.9       Recent Labs   Lab 20  0536 20  0330 20  0551 20  0304   PROT 5.0* 5.6* 5.6*  --   --    ALBUMIN 1.7* 1.8* 1.5* 1.3*  1.3* 1.4*   BILITOT 0.7 0.8 0.8  --   --    AST 28 26 32  --   --    ALT 33 27 24   --   --    ALKPHOS 104 106 97  --   --        No results for input(s): PROTIME, PTT, INR in the last 168 hours.    Cardiac: No results for input(s): TROPONINI, CKTOTAL, CKMB, BNP in the last 168 hours.    FLP:   Lab Results   Component Value Date    TRIG 94 03/29/2020     DM:   Lab Results   Component Value Date    HGBA1C 7.4 (H) 01/28/2020    MICROALBUR 1.3 01/28/2020    CREATININE 2.2 (H) 03/30/2020     Thyroid: No results found for: TSH, FREET4, A1NTQAN, M9KLBZZ, THYROIDAB  Anemia: No results found for: IRON, TIBC, FERRITIN, [id number], FOLATE  Urinalysis:   Lab Results   Component Value Date    COLORU Yellow 03/21/2020    SPECGRAV 1.010 03/21/2020    NITRITE Negative 03/21/2020    KETONESU Negative 03/21/2020    UROBILINOGEN Negative 03/21/2020     Microbiology:  Microbiology Results (last 7 days)     Procedure Component Value Units Date/Time    Blood culture [[id number]] Collected:  03/21/20 1955    Order Status:  Completed Specimen:  Blood from Peripheral, Antecubital, Right Updated:  03/26/20 1508     Blood Culture, Routine No growth after 4 days    Blood culture [[id number]] Collected:  03/21/20 1955    Order Status:  Completed Specimen:  Blood from Peripheral, Antecubital, Left Updated:  03/26/20 1508     Blood Culture, Routine No growth after 4 days        Current Medications:     Infusions:   sodium chloride 0.9% 200 mL/hr at 03/29/20 1230    dexmedetomidine (PRECEDEX) infusion 1 mcg/kg/hr (03/30/20 0301)    fentanyl Stopped (03/30/20 0737)    furosemide (LASIX) 2 mg/mL continuous infusion (non-titrating) 10 mg/hr (03/29/20 2151)    heparin (porcine) in 5 % dex Stopped (03/30/20 0100)        Scheduled:   atorvastatin  40 mg Oral QHS    chlorhexidine  15 mL Mouth/Throat BID    clopidogreL  75 mg Oral Daily    famotidine (PF)  20 mg Intravenous Daily    fenofibrate  145 mg Oral Daily    heparin (porcine)  5,000 Units Subcutaneous Q12H    insulin detemir U-100  15 Units Subcutaneous QHS    sodium  citrate-citric acid 500-334 mg/5 ml  30 mL Oral TID        PRN:  acetaminophen, albuterol, aluminum-magnesium hydroxide-simethicone, Dextrose 10% Bolus, Dextrose 10% Bolus, fentaNYL, glucagon (human recombinant), glucose, glucose, heparin, porcine (PF), insulin aspart U-100, magnesium sulfate IVPB, ondansetron, sodium chloride 0.9%, sodium phosphate IVPB, sodium phosphate IVPB, sodium phosphate IVPB      Assessment:     Zhao Fischer is a 74 y.o.male with  Patient Active Problem List    Diagnosis Date Noted    Hyperglycemia     Chronic obstructive pulmonary disease     Hypoglycemia 03/26/2020    Goals of care, counseling/discussion 03/24/2020    Counseling regarding advance care planning and goals of care 03/24/2020    Palliative care encounter 03/24/2020    Acute respiratory disease due to COVID-19 virus 03/24/2020    KEYUR (acute kidney injury) 03/24/2020    Elevated LFTs 03/24/2020    Cough 03/23/2020    Acute respiratory distress syndrome (ARDS) due to COVID-19 virus 03/22/2020    Pneumonia due to severe acute respiratory syndrome coronavirus 2 (SARS-CoV-2) 03/22/2020    Acute respiratory failure with hypoxia 03/21/2020    Type 2 diabetes mellitus with diabetic peripheral angiopathy without gangrene, without long-term current use of insulin 01/09/2020    Essential hypertension 01/09/2020    Hyperlipidemia, mixed 01/09/2020    PAD (peripheral artery disease) 01/09/2020    ASCVD (arteriosclerotic cardiovascular disease) 01/09/2020        Plan:     Neuro:  #Uremic  #Sedated, Intubated  - Fent 100, Dex 1 this morning  - SAT/SBT this morning    Cardiovascular/Hemodynamics:  #Sepsis  #COVID19  #PAD  #HTN  - not on pressors  - Holding amlodipine and lisinopril  - continue atorvastatin, plavix, fenofibrate  - starting coreg 6.25mg BID, if needing more BP control start hydralazine    Respiratory:   #Acute Hypoxic Respiratory Failure  #COVID19  - FiO2 40, PEEP 8, Sats >92%  - SBT/SAT This morning, wean  peep this morning as able  - if tolerated and able to mentate appropriately, will extubate today  - Completed 6 days of azithromycin, 2 days of rocephin, 3.5 days of HCQ    GI/FEN:  Electrolytes stable this morning  Nutrition: Tube Feeds as able    ID:   #COVID19  - completed 3.5 days of HCQ    Renal:   #KEYUR on CKD  - initially requiring CRRT/iHD  - Cr downtrending, now 2.2 this morning  - now on lasix gtt, making urine (UOP 1830cc over the past 24 hours)  - Net +2.5L    Heme/Onc:   Hgb stable    Endocrine:  #DM2  - Levemir 15->25 U nightly +SSI  - Add Levemir 10 units this morning    Prophylaxis:   VTE: Heparin q12   Stress Ulcer: Famotidine daily    Code: Full    Dispo: SBT/SAT this morning, if mentation improves likely extubate.    Real Felix MD  [facility]  [facility]/Critical Care Service

## 2020-03-30 NOTE — PLAN OF CARE
Recommendation:   1. Consider changing TF formula to Impact Peptide 1.5 and advance as tolerated to goal rate of 50ml/hr to provide 1800 kcal, 113g protein, & 924ml free water.   2. Will follow to monitor TF tolerance and nutrition status,    Goals:  1. Pt will tolerate TF.   2. TF will meet at least 85% estimated kcal/protein needs  Nutrition Goal Status: progressing towards goal  Communication of RD Recs: other (comment)(POC)

## 2020-03-30 NOTE — ASSESSMENT & PLAN NOTE
Hold home glimepiride, empagliflozin. Giving insulin detemir 25 units nightly, insulin aspart 10 units every 6 hours, insulin aspart sliding scale. Monitor Accuchecks.

## 2020-03-30 NOTE — ASSESSMENT & PLAN NOTE
ASCVD (arteriosclerotic cardiovascular disease)  Hyperlipidemia   PAD (peripheral artery disease)  Continue atorvastatin 40 mg daily, clopidrogel 75 mg daily. Hold home metoprolol succinate, amlodipine, lisinopril.

## 2020-03-30 NOTE — PROGRESS NOTES
"[facility]  Adult Nutrition  Progress Note    SUMMARY       Recommendations    Recommendation:   1. Consider changing TF formula to Impact Peptide 1.5 and advance as tolerated to goal rate of 50ml/hr to provide 1800 kcal, 113g protein, & 924ml free water.   2. Will follow to monitor TF tolerance and nutrition status,    Goals:  1. Pt will tolerate TF.   2. TF will meet at least 85% estimated kcal/protein needs  Nutrition Goal Status: progressing towards goal  Communication of RD Recs: other (comment)(POC)    Reason for Assessment  Reason For Assessment: RD follow-up  Diagnosis: (pneumonia/COVID)  Relevant Medical History: HTN, DM, HLD, PAD, angioplasty, cardiac catherization  General Information Comments: Pt remains intubated on vent. Receiving CRRT. OG tube. Receiving TF of Isosource 1.5 at 25ml/hr. COVID +  Nutrition Discharge Planning: d/c needs to be determined    Nutrition Risk Screen  Nutrition Risk Screen: tube feeding or parenteral nutrition    Nutrition/Diet History  Food Preferences: unable to assess  Spiritual, Cultural Beliefs, Amish Practices, Values that Affect Care: yes  Factors Affecting Nutritional Intake: NPO, on mechanical ventilation    Anthropometrics  Temp: (!) 100.7 °F (38.2 °C)  Height Method: Stated  Height: 5' 8" (172.7 cm)  Height (inches): 68 in  Weight Method: Bed Scale  Weight: 73.9 kg (162 lb 14.7 oz)  Weight (lb): 162.92 lb  Ideal Body Weight (IBW), Male: 154 lb  % Ideal Body Weight, Male (lb): 105.79 %  BMI (Calculated): 24.8  BMI Grade: 18.5-24.9 - normal     Lab/Procedures/Meds  Pertinent Labs Reviewed: reviewed  Pertinent Labs Comments: BUN 99H, Crea 2.4H, Glu 467H, Ca 8.3L, Alb 1.4L  Pertinent Medications Reviewed: reviewed  Pertinent Medications Comments: insulin, precedex, fentanyl, Lasix, heparin    Estimated/Assessed Needs  Weight Used For Calorie Calculations: 73.9 kg (162 lb 14.7 oz)  Energy Calorie Requirements (kcal): 2022  Energy Need Method: [place] " [place]  Protein Requirements: 96g (1.3g.kg)  Weight Used For Protein Calculations: 73.9 kg (162 lb 14.7 oz)  Estimated Fluid Requirement Method: RDA Method  RDA Method (mL): 2022  CHO Requirement: 225f    Nutrition Prescription Ordered  Current Diet Order: NPO  Current Nutrition Support Formula Ordered: Isosource 1.5  Current Nutrition Support Rate Ordered: 25 (ml)  Current Nutrition Support Frequency Ordered: ml/hr    Evaluation of Received Nutrient/Fluid Intake  Enteral Calories (kcal): 900  Enteral Protein (gm): 41  Enteral (Free Water) Fluid (mL): 458  Lipid Calories (kcals): 0 kcals  Other Calories (kcal): 326  Total Calories (kcal): 1226  % Kcal Needs: 44  % Protein Needs: 43  I/O: 1470/2355  Energy Calories Required: not meeting needs  Protein Required: not meeting needs  Fluid Required: not meeting needs  Comments: LBM 3/22  % Intake of Estimated Energy Needs: 25 - 50 %  % Meal Intake: NPO    Nutrition Risk  Level of Risk/Frequency of Follow-up: (2xweekly)     Assessment and Plan  Acute respiratory distress syndrome (ARDS) due to COVID-19 virus  Contributing Nutrition Diagnosis  Inadequate energy intake    Related to (etiology):   intubation    Signs and Symptoms (as evidenced by):   NPO    Interventions:  Enteral nutrition    Nutrition Diagnosis Status:   Continues      Monitor and Evaluation  Food and Nutrient Intake: energy intake, food and beverage intake, enteral nutrition intake  Food and Nutrient Adminstration: diet order, enteral and parenteral nutrition administration  Knowledge/Beliefs/Attitudes: food and nutrition knowledge/skill  Physical Activity and Function: nutrition-related ADLs and IADLs  Anthropometric Measurements: weight, weight change, body mass index  Biochemical Data, Medical Tests and Procedures: electrolyte and renal panel, gastrointestinal profile, glucose/endocrine profile, inflammatory profile, lipid profile  Nutrition-Focused Physical Findings: overall appearance     Malnutrition  Assessment  NFPE not performed, patient has been screened for possible COVID-19 and has been placed on airborne and contact precautions. Patient is noted as being positive for COVID-19.       Nutrition Follow-Up  RD Follow-up?: Yes

## 2020-03-30 NOTE — PROGRESS NOTES
Patient seen and examined by me. I agree with the trainee's separate note except as modified.     Needed some increased sedation O/N. Received 4 hours of CRRT.     I/O: -1.2L (overall 2.7 +)    Tmax 100.4, HR 80s, MAP 80s, 94% on 40% and 8 PEEP.     Bicarb 24, K 4.8, creatinine 2.2, BUN down to 89.     On my exam: Sedated on the vent    Impression: COVID-19 with respiratory failure and KEYUR. Vent liberation limited by mental status     Recommendations:   -cont lasix gtt  -dialysis for uremia  -start coreg for BP control   -completed abx and HCQ    Critical care time (30min) spent personally by me on the following activities: development of treatment plan with patient or surrogate and bedside caregivers, discussions with consultants, evaluation of patient's response to treatment, examination of patient, ordering and performing treatments and interventions, ordering and review of laboratory studies, ordering and review of radiographic studies, pulse oximetry, re-evaluation of patient's condition. This critical care time did not overlap with that of any other provider or involve time for any procedures.

## 2020-03-31 NOTE — PROGRESS NOTES
[facility] Pulmonary/Critical Care Resident Progress Note    Primary Team: [facility]  Attending Physician: Ezequiel Huang MD    Subjective:      Intubation day 8    CRRT started 425. Still making significant amount of urine, -3229cc of urine (net -1.8L), overall +1.2L. Will attempt SAT/SBT again this morning.     Objective:     Last 24 Hour Vital Signs:  BP  Min: 74/54  Max: 216/101  Temp  Av.6 °F (37 °C)  Min: 97.6 °F (36.4 °C)  Max: 100.7 °F (38.2 °C)  Pulse  Av.4  Min: 86  Max: 130  Resp  Av.8  Min: 20  Max: 39  SpO2  Av.4 %  Min: 81 %  Max: 99 %  I/O last 3 completed shifts:  In: 2350 [I.V.:1130; NG/GT:1220]  Out: 4740 [Urine:4314; Other:426]    Physical Examination:  Exam limited due to strict isolation precautions  GEN: intubated, sedated  HEENT: ETT appears in place, NCAT  CV: RRR, S1/S2 appreciated  Resp: Coarse breath sounds, seemingly improved  Abdomen: soft, nondistended, +BS  Ext: 2+ distal pulses, no peripheral edema  Neuro: not following commands, will attempt to wean sedation again and re-assess    Laboratory:  Trended Lab Data:  Recent Labs   Lab 20  0536 20  0330 20  0551   WBC 9.83 13.15* 9.37   HGB 13.8* 13.9* 13.1*   HCT 42.1 41.4 38.9*    262 305       Recent Labs   Lab 20  1145 20  2218 20  0340    141 143   K 4.9 4.5 3.8    100 103   CO2 27 32* 29   BUN 99* 106* 82*   CREATININE 2.4* 2.5* 1.9*   * 419* 283*   CALCIUM 8.3* 8.1* 8.5*   MG 2.5 2.5 2.5   PHOS 3.0 3.1 2.5*       Recent Labs   Lab 20  0536 20  0330 20  0551  20  1145 20  2218 20  0340   PROT 5.0* 5.6* 5.6*  --   --   --   --    ALBUMIN 1.7* 1.8* 1.5*   < > 1.4* 1.4* 1.4*   BILITOT 0.7 0.8 0.8  --   --   --   --    AST 28 26 32  --   --   --   --    ALT 33 27 24  --   --   --   --    ALKPHOS 104 106 97  --   --   --   --     < > = values in this interval not displayed.     Cardiac: No results for  input(s): TROPONINI, CKTOTAL, CKMB, BNP in the last 168 hours.    FLP:   Lab Results   Component Value Date    TRIG 94 03/29/2020     DM:   Lab Results   Component Value Date    HGBA1C 7.4 (H) 01/28/2020    MICROALBUR 1.3 01/28/2020    CREATININE 1.9 (H) 03/31/2020     Thyroid: No results found for: TSH, FREET4, X9JCKAM, J1YGAER, THYROIDAB  Anemia: No results found for: IRON, TIBC, FERRITIN, [id number], FOLATE  Urinalysis:   Lab Results   Component Value Date    COLORU Yellow 03/21/2020    SPECGRAV 1.010 03/21/2020    NITRITE Negative 03/21/2020    KETONESU Negative 03/21/2020    UROBILINOGEN Negative 03/21/2020     Microbiology:  Microbiology Results (last 7 days)     Procedure Component Value Units Date/Time    Blood culture [[id number]] Collected:  03/21/20 1955    Order Status:  Completed Specimen:  Blood from Peripheral, Antecubital, Right Updated:  03/26/20 1508     Blood Culture, Routine No growth after 4 days    Blood culture [[id number]] Collected:  03/21/20 1955    Order Status:  Completed Specimen:  Blood from Peripheral, Antecubital, Left Updated:  03/26/20 1508     Blood Culture, Routine No growth after 4 days        Current Medications:     Infusions:   dexmedetomidine (PRECEDEX) infusion 1 mcg/kg/hr (03/31/20 0530)    fentanyl 150 mcg/hr (03/31/20 0704)    furosemide (LASIX) 2 mg/mL continuous infusion (non-titrating) Stopped (03/31/20 0420)    heparin (porcine) in 5 % dex 750 Units/hr (03/31/20 0425)        Scheduled:   atorvastatin  40 mg Oral QHS    carvediloL  6.25 mg Oral BID    chlorhexidine  15 mL Mouth/Throat BID    clopidogreL  75 mg Oral Daily    famotidine (PF)  20 mg Intravenous Daily    fenofibrate  145 mg Oral Daily    heparin (porcine)  5,000 Units Subcutaneous Q12H    insulin aspart U-100  5 Units Subcutaneous Q6H    insulin detemir U-100  25 Units Subcutaneous QHS    sodium citrate-citric acid 500-334 mg/5 ml  30 mL Oral TID        PRN:  acetaminophen, albuterol,  aluminum-magnesium hydroxide-simethicone, Dextrose 10% Bolus, Dextrose 10% Bolus, fentaNYL, glucagon (human recombinant), glucose, glucose, heparin (porcine), heparin, porcine (PF), insulin aspart U-100, ondansetron, sodium chloride 0.9%      Assessment:     Zhao Fischer is a 74 y.o.male with COPD, CKD, T2DM, HTN, HLD, PAD who was found to be COVID19+, intubated and currently in the ICU.     Plan:     Neuro:  #Intubated; Sedated  #Uremia  - On Fentanyl 150, Precedex 1 this morning  - Will perform another SAT/SBT this morning  - Continue CRRT this morning    Cardiovascular/Hemodynamics:  #Sepsis  #COVID19  #PAD  #HTN  - Not currently requiring pressors  - Holding amlodipine and lisinopril  - Hypertensive, started coreg 6.25mg PO BID  - Normotensive this morning, will hold off on uptitrating or adding another agent    Respiratory:   #Acute Hypoxic Respiratory Failure  #COVID19  - Intubated 3/23/20  - Continue low tidal volume strategy  - FiO2 40, PEEP 8, Sats >92%. Will come down to a PEEP of 5 if able  - SAT/SBT this morning  - Received 6 days of azithromycin, 2 days of rocephin, 3.5 days of HCQ  - Start scheduled bronchodilators    GI/FEN:  Electrolytes stable  Nutrition: Tube Feeds: Received 675cc over the past 24 hours    ID:   #COVID19  - completed 3.5 days of HCQ    Renal:   #ARF on CKD  - Cr downtrending, now 1.9 this morning  - Net 1.8L negative over the past 24 hours  - Net +1.2L since admission  - CRRT - 4 hours this morning, restart lasix afterwards.  - CRRT again tonight (?) -- Discuss with nephrology    Heme/Onc:   CBC this morning    Endocrine:  #DM2  - Received: Levemir 10 U qAM, 25 U nightly + SSI  - Changed to Levemir 20 U BID today, + SSI    Prophylaxis:   VTE: Heparin   Stress Ulcer: Pepcid    Lines/Drains:  ETT 3/23/20  OG  Roca  PIV x3  Trialysis [facility] 3/27/20    Real Felix MD  [facility]  [facility]/Critical Care Service

## 2020-03-31 NOTE — NURSING
Pt noted to be hypoxic and hypotensive. Vent settings changed, 60%fiO2 14 PEEP. Sedation decreased. Dr. Felix notified. Pt now w/ O2sat 88%.

## 2020-03-31 NOTE — SUBJECTIVE & OBJECTIVE
Interval History: On assist control, rate 24, tidal volume 420, FiO2 60%, PEEP 14. SpO2 89%. On fentanyl, dexmedetomidine.    Review of Systems   Unable to perform ROS: Intubated     Objective:     Vital Signs (Most Recent):  Temp: 98.9 °F (37.2 °C) (03/31/20 1200)  Pulse: 89 (03/31/20 1430)  Resp: (!) 24 (03/31/20 1430)  BP: 139/75 (03/31/20 1430)  SpO2: (!) 91 % (03/31/20 1430) Vital Signs (24h Range):  Temp:  [97.6 °F (36.4 °C)-98.9 °F (37.2 °C)] 98.9 °F (37.2 °C)  Pulse:  [] 89  Resp:  [22-39] 24  SpO2:  [81 %-99 %] 91 %  BP: ()/() 139/75     Weight: 73.9 kg (162 lb 14.7 oz)  Body mass index is 24.77 kg/m².    Intake/Output Summary (Last 24 hours) at 3/31/2020 1644  Last data filed at 3/31/2020 1500  Gross per 24 hour   Intake 1647.85 ml   Output 2546 ml   Net -898.15 ml      Physical Exam   Constitutional: He appears well-developed. He is sedated and intubated.   Pulmonary/Chest: Effort normal. He is intubated. No respiratory distress.   Nursing note and vitals reviewed.      Significant Labs: All pertinent labs within the past 24 hours have been reviewed.    Significant Imaging: I have reviewed all pertinent imaging results/findings within the past 24 hours.   X-Ray Chest 1 View 3/23/20: FINDINGS: ET tube distal tip mid trachea, enteric tube distal tip within the stomach, numerous EKG wires.  The cardiac silhouette is midline, normal in size.  Patchy areas of subsegmental airspace opacity more so at the periphery of the bilateral lungs concerning for multifocal pneumonia, it is worse compared to the prior exam.  No large pleural effusion.  No pneumothorax.  The osseous structures appear normal.  Impression: Multifocal areas of abnormal attenuation concerning for multifocal pneumonia worse from the prior exam.  X-Ray Chest 1 View 3/26/20: FINDINGS: Endotracheal and gastric tubes remain in place; the ET tip is about 3.5 cm above the pablo.  Heart size is normal.  Mediastinal structures are  midline.  Lungs are expanded and continue to show ground-glass and interstitial opacification in the middle and lower lung zones bilaterally with some interval improvement/clearing.  These pulmonary findings could represent edema, aspiration, multifocal pneumonia, etc.  No significant pleural fluid is seen.  Skeletal structures are intact.  Impression: Continued intubation with tip above pablo  Improving widespread lung opacification, as above.  X-Ray Chest 1 View 3/27/20:   FINDINGS: Central venous catheter in the SVC.  Endotracheal and NG tube remain.  No significant changes in the cardiopulmonary status.

## 2020-03-31 NOTE — PHYSICIAN QUERY
PT Name: Zhao Fischer  MR #: [id number]    Physician Query Form -Systemic Infectious Process Clarification     CDS: Farida Crenshaw RN    Contact information:[email or web address] or (cell) [phone]    This form is a permanent document in the medical record.     Query Date: 2020     By submitting this query, we are merely seeking further clarification of documentation. Please utilize your independent clinical judgment when addressing the question(s) below.    The Medical record contains the following:     Indicators   Supporting Clinical Findings   Location in Medical Record   x HR RR BP Temp Last 24 Hour Vital Signs:  BP Min: 84/60 Max: 204/96  Temp Av.8 °F (37.1 °C) Min: 96.8 °F (36 °C) Max: 100.3 °F (37.9 °C)  Pulse Av.8 Min: 78 Max: 139  Resp Av.7 Min: 18 Max: 69  SpO2 Av.8 % Min: 73 % Max: 100 %   Pulm PN 3/30   x Lactic Acid             Procalcitonin  3/21   Lactate 1.0        3/21   Procalcitonin 0.07      Lab Results     WBC                Bands                     CRP      Culture(s)     x AMS, Confusion, LOC, etc. encephalopathy persists and worsening hyperkalemia despite lasix.   Pulm PN 3/29   x Organ Dysfunction / Failure Pneumonia due to severe acute respiratory syndrome coronavirus 2 (SARS-CoV-2)  Acute Respiratory Disease due to Covid-19 Virus  Acute respiratory failure with hypoxia    KEYUR (acute kidney injury)  Avoid nephrotoxic agens  Renally dose all meds  Monitor  Consult nephrology-Appreciates nephrology rec's- IV lasix for now, may likely require HD given disease trajectory   Strict input/output HM PN 3/29          HM PN 3/30   x Bacteremia or Sepsis / Septic Cardiovascular/Hemodynamics:  #Sepsis  #COVID19  #PAD  #HTN  - not on pressors  - Holding amlodipine and lisinopril  - continue atorvastatin, plavix, fenofibrate  - starting coreg 6.25mg BID, if needing more BP control start hydralazine    Pulm PN 3/30   x Known or Suspected Source of Infection  documented     3/21   SARS-CoV2 (COVID-19) Qualitative PCR Detected       Infectious Disease    (Failed) Outpatient Treatment      Medication      Treatment     x Other Patient presented [facility] ED 3/21/20 with nonproductive cough, low grade fever/chills and SOB x 1 week   H&P 3/22     Provider, please specify diagnosis or diagnoses associated with above clinical findings.      [ x  ] Sepsis due to Covid-19     [   ] Severe Sepsis with Acute Organ Dysfunction/Failure due to Covid-19     [   ] Sepsis Ruled Out     [   ] Other: __________________________________     [  ]  Clinically Undetermined         Please document in your progress notes daily for the duration of treatment until resolved and include in your discharge summary.

## 2020-03-31 NOTE — PROGRESS NOTES
[facility] Medicine  Progress Note    Patient Name: Zhao Fischer  MRN: [id number]  Patient Class: IP- Inpatient   Admission Date: 3/21/2020  Length of Stay: 8 days  Attending Physician: Ezequiel Huang MD  Primary Care Provider: Mannie Russell MD        Subjective:     Principal Problem:Pneumonia due to severe acute respiratory syndrome coronavirus 2 (SARS-CoV-2)        HPI:  Zhao Fischer is a 74 year old white man with hypertension, hyperlipidemia, diabetes mellitus type 2, peripheral artery disease, arteriosclerotic cardiovascular disease. He lives in [place], [place]. He has been  since he was 16 years old. His primary care physician is Dr. Mannie Russell.    He contacted Dr. Russell on 3/16/2020 to request testing for COVID-19 due to nonproductive cough, shortness of breath, and fever for the past day, but he could not get tested due to limited outpatient testing. He was prescribed levofloxacin 500 mg daily.    He presented to [facility] Department on 3/21/2020 with persistent symptoms, including diarrhea, which is common with COVID-19 infection, and poor appetite. He reported that his wife had similar symptoms. He was hypoxic, with oxygen saturation of 88%. Chest X-ray showed interstitial lung opacities. WBC count was low at 2780. BNP, lactate, and procalcitonin were low. CRP was elevated at 154 mg/L. COVID-19 testing has been liberal in the emergency department (criteria of cough alone is enough) so he was tested. He was not given anything other than supplemental oxygen in the emergency department. He was admitted to [facility].     Overview/Hospital Course:  He was put on hydroxychloroquine and azithromycin. His hypoxia worsened overnight between 3/22/2020 and 3/23/2020. Pulmonology was consulted and he was intubated. COVID-19 result was reported positive on 3/24/2020. He developed acute kidney injury on 3/24/2020.  Palliative Care was consulted. Hydroxychloroquine was stopped early due to hypoglycemia. Nephrology was consulted on 3/26/2020. A left internal jugular trialysis catheter was placed on 3/27/2020. He finished a course of azithromycin. Pulmonology diagnosed him with chronic obstructive pulmonary disease.     Interval History: On assist control, rate 24, tidal volume 420, FiO2 60%, PEEP 14. SpO2 89%. On fentanyl, dexmedetomidine.    Review of Systems   Unable to perform ROS: Intubated     Objective:     Vital Signs (Most Recent):  Temp: 98.9 °F (37.2 °C) (03/31/20 1200)  Pulse: 89 (03/31/20 1430)  Resp: (!) 24 (03/31/20 1430)  BP: 139/75 (03/31/20 1430)  SpO2: (!) 91 % (03/31/20 1430) Vital Signs (24h Range):  Temp:  [97.6 °F (36.4 °C)-98.9 °F (37.2 °C)] 98.9 °F (37.2 °C)  Pulse:  [] 89  Resp:  [22-39] 24  SpO2:  [81 %-99 %] 91 %  BP: ()/() 139/75     Weight: 73.9 kg (162 lb 14.7 oz)  Body mass index is 24.77 kg/m².    Intake/Output Summary (Last 24 hours) at 3/31/2020 1644  Last data filed at 3/31/2020 1500  Gross per 24 hour   Intake 1647.85 ml   Output 2546 ml   Net -898.15 ml      Physical Exam   Constitutional: He appears well-developed. He is sedated and intubated.   Pulmonary/Chest: Effort normal. He is intubated. No respiratory distress.   Nursing note and vitals reviewed.      Significant Labs: All pertinent labs within the past 24 hours have been reviewed.    Significant Imaging: I have reviewed all pertinent imaging results/findings within the past 24 hours.   X-Ray Chest 1 View 3/23/20: FINDINGS: ET tube distal tip mid trachea, enteric tube distal tip within the stomach, numerous EKG wires.  The cardiac silhouette is midline, normal in size.  Patchy areas of subsegmental airspace opacity more so at the periphery of the bilateral lungs concerning for multifocal pneumonia, it is worse compared to the prior exam.  No large pleural effusion.  No pneumothorax.  The osseous structures appear  normal.  Impression: Multifocal areas of abnormal attenuation concerning for multifocal pneumonia worse from the prior exam.  X-Ray Chest 1 View 3/26/20: FINDINGS: Endotracheal and gastric tubes remain in place; the ET tip is about 3.5 cm above the pablo.  Heart size is normal.  Mediastinal structures are midline.  Lungs are expanded and continue to show ground-glass and interstitial opacification in the middle and lower lung zones bilaterally with some interval improvement/clearing.  These pulmonary findings could represent edema, aspiration, multifocal pneumonia, etc.  No significant pleural fluid is seen.  Skeletal structures are intact.  Impression: Continued intubation with tip above pablo  Improving widespread lung opacification, as above.  X-Ray Chest 1 View 3/27/20:   FINDINGS: Central venous catheter in the SVC.  Endotracheal and NG tube remain.  No significant changes in the cardiopulmonary status.      Assessment/Plan:      * Pneumonia due to severe acute respiratory syndrome coronavirus 2 (SARS-CoV-2)  Acute respiratory distress syndrome (ARDS) due to COVID-19 virus  Acute respiratory failure with hypoxia  Appreciate Infectious Disease and Pulmonology. Treating with hydroxychloroquine, azithromycin, ceftriaxone.    Chronic obstructive pulmonary disease  Newly diagnosed by Pulmonology.    KEYUR (acute kidney injury)  Appreciate Nephrology.    Acute respiratory failure with hypoxia  See primary problem.    Essential hypertension  ASCVD (arteriosclerotic cardiovascular disease)  Hyperlipidemia   PAD (peripheral artery disease)  Continue atorvastatin 40 mg daily, clopidrogel 75 mg daily. Hold home metoprolol succinate, amlodipine, lisinopril.    Type 2 diabetes mellitus with diabetic peripheral angiopathy without gangrene, without long-term current use of insulin  Hold home glimepiride, empagliflozin. Giving insulin detemir 25 units nightly, insulin aspart 10 units every 6 hours, insulin aspart sliding  scale. Monitor Accuchecks.      VTE Risk Mitigation (From admission, onward)         Ordered     heparin (porcine) injection 2,000 Units  As needed (PRN)      03/31/20 0107     heparin, porcine (PF) 100 unit/mL injection flush 200 Units  As needed (PRN)      03/29/20 1459     heparin 25,000 units in dextrose 5% 250 mL (100 units/mL) infusion (heparin infusion - NO NOMOGRAM)  Continuous      03/29/20 1332     heparin (porcine) injection 5,000 Units  Every 12 hours      03/23/20 1357     IP VTE HIGH RISK PATIENT  Once      03/21/20 2227     Place sequential compression device  Until discontinued      03/21/20 2227                Critical care time spent on the evaluation and treatment of severe organ dysfunction, review of pertinent labs and imaging studies, discussions with consulting providers and discussions with patient/family: 30 minutes.      Ezequiel Huang MD  Department of Hospital Medicine   [facility]

## 2020-03-31 NOTE — PROGRESS NOTES
Patient seen and examined by me. I agree with the trainee's separate note except as modified.       Unable to start CRRT last night, on it this AM    I/O: -1.7  Tmax 100.7, HR 80-100s, , 80-90% on 40% and 8 PEEP.     Labs: BUN 82. Bicarb 29    On my exam: Sedated on the vent. High work of breathing.     Impression: COVID-19 with respiratory failure and KEYUR.     Recommendations:   -CRRT again today   -bronchodilators   -cont lasix gtt  -titrate up insulin   -plan update given to his wife    Critical care time (30min) spent personally by me on the following activities: development of treatment plan with patient or surrogate and bedside caregivers, discussions with consultants, evaluation of patient's response to treatment, examination of patient, ordering and performing treatments and interventions, ordering and review of laboratory studies, ordering and review of radiographic studies, pulse oximetry, re-evaluation of patient's condition. This critical care time did not overlap with that of any other provider or involve time for any procedures.

## 2020-03-31 NOTE — NURSING
CRRT initiated at 0425. Lines pulled and flushed good. Pt more restless when on dialysis. Increased sedation to help pt feel more comfortable.

## 2020-03-31 NOTE — PHYSICIAN QUERY
PT Name: Zhao Fischer  MR #: [id number]     CDS: Farida Crenshaw RN     Contact information:[email or web address] or (cell) [phone]    This form is a permanent document in the medical record.     Query Date: March 31, 2020    Physician Query - Neurological Condition Clarification    By submitting this query, we are merely seeking further clarification of documentation to reflect the severity of illness of your patient. Please utilize your independent clinical judgment when addressing the question(s) below.    The Medical record reflects the following:     Indicators   Supporting Clinical Findings Location in Medical Record   x AMS, Confusion,  LOC, etc.  encephalopathy persists and worsening hyperkalemia despite lasix. Pulm PN 3/29   x Acute / Chronic Illness Pneumonia due to severe acute respiratory syndrome coronavirus 2 (SARS-CoV-2)  Acute Respiratory Disease due to Covid-19 Virus  Acute respiratory failure with hypoxia     Appreciate Infectious Disease and Pulmonology.   COVID 19 positive- per ID - ydroxychloroquine, azithromycin & ceftriaxone and monitor QTc  Consult palliative care for support    Cardiovascular/Hemodynamics:  #Sepsis  #COVID19  #PAD  #HTN  - not on pressors  - Holding amlodipine and lisinopril  - continue atorvastatin, plavix, fenofibrate  - starting coreg 6.25mg BID, if needing more BP control start hydralazine  PN 3/29                  Pulm PN 3/30    Radiology Findings     x Electrolyte Imbalance FEN/METABOLIC  Hyperkalemia 2/2 KEYUR - shifting today, see KEYUR  Hyponatremia 2/2 KEYUR - stable  AGMA 2/2 KEYUR - stable  Hypertriglyceridemia, mild  Hyperphos 2/2 KEYUR  Hyperglycemia - added detemir, q6hour glucose checks, SSI   Pulm PN 3/29    Medication      Treatment              Other       Encephalopathy- is a general term for any diffuse disease of the brain that alters brain function or structure. Treatment of the cognitive dysfunction varies but is ultimately dependent on the treatment of  the underlying condition.    Major Symptoms of Encephalopathy - Decreased level of consciousness, fluctuating alertness/concentration, confusion, agitation, lethargy, somnolence, drowsiness, obtundation, stupor, or coma.         References: National Institutes of Healths (NIH) National [place] of Neurological Disorders and Strokes;  HCPro 2016; Advisory Board     Clinical Guidelines:   These guidelines will set system standards to assist providers in managing, documentation, and coding of encephalopathy. The intent of this document is to serve as a system guideline, not replace the providers clinical judgment:    Provider, please specify the diagnosis or diagnoses associated with above clinical findings.    [x   ] Anoxic/Hypoxic Encephalopathy- Brain damage due to anoxia/hypoxia     [   ] Metabolic Encephalopathy - Due to electrolye imbalance, metabolic derangements, or infections processes, includes Septic Encephalopathy     [   ] Unspecified Encephalopathy        [   ] Other Neurological Condition-  Includes Post-ictal altered mental status. (please specify condition): _________     [   ]  Clinically Undetermined     Please document in your progress notes daily for the duration of treatment until resolved, and include in your discharge summary.

## 2020-03-31 NOTE — PROGRESS NOTES
[facility] Progress Note    Subjective:      Zhao Fischer is a 74 y.o.  male who is being followed by the [facility] service at [facility] for KEYUR.   Intubated, Sedated and COVID positive  Will Continue daily CRRT.        Objective:   Last 24 Hour Vital Signs:  BP  Min: 74/54  Max: 216/101  Temp  Av.1 °F (36.7 °C)  Min: 97.6 °F (36.4 °C)  Max: 98.7 °F (37.1 °C)  Pulse  Av.7  Min: 64  Max: 120  Resp  Av  Min: 22  Max: 39  SpO2  Av.5 %  Min: 81 %  Max: 99 %  I/O last 3 completed shifts:  In: 2453.5 [I.V.:1208.5; NG/GT:1245]  Out: 5120 [Urine:4314; Other:806]    Physical Examination:  EXAM Deffered  COVID Positive      Laboratory:  Laboratory Data Reviewed: yes    Microbiology Data Reviewed: yes    Radiology Data Reviewed: yes    Current Medications:     Infusions:   dexmedetomidine (PRECEDEX) infusion 0.601 mcg/kg/hr (20 1200)    fentanyl 7.5 mL/hr at 20 1200    furosemide (LASIX) 2 mg/mL continuous infusion (non-titrating) Stopped (20 0420)    heparin (porcine) in 5 % dex Stopped (20 0911)        Scheduled:   albuterol-ipratropium  3 mL Nebulization Q4H    atorvastatin  40 mg Oral QHS    carvediloL  6.25 mg Oral BID    chlorhexidine  15 mL Mouth/Throat BID    clopidogreL  75 mg Oral Daily    famotidine (PF)  20 mg Intravenous Daily    fenofibrate  145 mg Oral Daily    heparin (porcine)  5,000 Units Subcutaneous Q12H    insulin aspart U-100  5 Units Subcutaneous Q6H    insulin detemir U-100  20 Units Subcutaneous BID    polyethylene glycol  17 g Oral Daily    senna  8.6 mg Oral Daily    sodium citrate-citric acid 500-334 mg/5 ml  30 mL Oral TID        PRN:  acetaminophen, aluminum-magnesium hydroxide-simethicone, Dextrose 10% Bolus, Dextrose 10% Bolus, fentaNYL, glucagon (human recombinant), glucose, glucose, heparin (porcine), heparin, porcine (PF), insulin aspart U-100, ondansetron, sodium chloride 0.9%      Assessment and  Plan:    Zhao Fischer is a 74 y.o.male with   #) Oliguric KEYUR   - Baseline Cr around 1.1, worsened to 3.2 today  - COVID 19 positive  - Urea Na: < 20, FeNA < 0.2, BUN/Cr: >20:1  - Dehydration vs hypotension (ATN) vs post renal  - s/p 500 cc bolus   - Patient is Net +3.7 L.  cc, Lasix 60 IV x Once at 12 noon, will monitor response, may need another dose.  - Will continue to monitor  - Renally dose all medications, avoid contrast and phos based enemas.  03/27/2020  - Cr stable at 3.2, UOP: 475 ml  - Lasix 80 mg IV and reaccess  03/28/2020  - Cr improved a little 3.0  - UOP: 680 cc  - Net 3.9+  - On Lasix gtt (10 mg/hr)   03/30/2020  - Cr: 3.4 > 2.4  - UOP: 1076 > 1830 ml, Net: +2.5L  - On Lasix gtt (10 mg/hr)  - On high therapy flow CRRT regimen, Will continue daily CRRT ( 4-hours, Therapy flow rate: 5.0 L/hr and -300, Heparin gtt 500-750 units/hr if still clotting can try upto 1000 units, Monitor PTT).  03/31/2020  - Cr: 2.5 > 2.1  - UOP: 3229 ml, Net: +997.5 ml  - Changing CVVHD to CVVH to get a little better cytokine clearance  - Therapy fluid rate : 5.0 L.hr (73.9 x 60= 4.4), BFR: 250-300, UF goal; can go up to 200 ml/hr as tolerated. CVVH for 6-hours  - Give 2000 units of heparin IV stat and then heparin gtt 750 units/hr post-filter (CVVH).        Thank you for allowing us in taking care of this patient. Please call us if you have any questions regarding this patient.         Darline Dhaliwal MD  [facility]  [phone]    If after 5pm please forward any questions to the [facility] Nephrology Fellow/Attending on call.

## 2020-03-31 NOTE — NURSING
CONSTANTIN attempted. Sedation reduced by half. Pt now restless, coughing and asynchronous on vent. Does not follow commands. No purposeful movement noted. Does not track. Central reflexes intact but weak. Increasing sedation to promote ventilator synchrony and pt comfort. Will continue to monitor and assess. Dr. Felix updated.

## 2020-03-31 NOTE — NURSING
CRRT unable to be started. Order to cathflo lines and will attempt at later time. Giving CRRT to another pt until lines are flowing per Dr. Dhaliwal

## 2020-03-31 NOTE — PLAN OF CARE
Patient on  with documented settings.  Alarms are set and functioning with adequate volumes.  AMBU bag and mask at bedside.

## 2020-04-01 NOTE — PROGRESS NOTES
[facility] Progress Note    Subjective:      Zhao Fischer is a 74 y.o.  male who is being followed by the [facility] service at [facility] for KEUYR.  Patient is sedated,intubated and on pressors.        Objective:   Last 24 Hour Vital Signs:  BP  Min: 77/53  Max: 160/75  Temp  Av.2 °F (36.8 °C)  Min: 97.8 °F (36.6 °C)  Max: 98.9 °F (37.2 °C)  Pulse  Av.6  Min: 61  Max: 104  Resp  Av.5  Min: 16  Max: 32  SpO2  Av %  Min: 84 %  Max: 96 %  I/O last 3 completed shifts:  In: 1507.7 [I.V.:857.7; NG/GT:650]  Out: 2960 [Urine:1459; Other:1501]    Physical Examination:  COVID positive, Exam deferred.       Laboratory:  Laboratory Data Reviewed: yes    Microbiology Data Reviewed: yes    Radiology Data Reviewed: yes    Current Medications:     Infusions:   dexmedetomidine (PRECEDEX) infusion 1.4 mcg/kg/hr (20 09)    fentanyl 225 mcg/hr (20 0921)    furosemide (LASIX) 2 mg/mL continuous infusion (non-titrating) 10 mg/hr (20 5835)    heparin (porcine) in 5 % dex Stopped (20 1300)        Scheduled:   albuterol-ipratropium  3 mL Nebulization Q4H    atorvastatin  40 mg Oral QHS    carvediloL  6.25 mg Oral BID    chlorhexidine  15 mL Mouth/Throat BID    clopidogreL  75 mg Oral Daily    famotidine (PF)  20 mg Intravenous Daily    fenofibrate  145 mg Oral Daily    heparin (porcine)  5,000 Units Subcutaneous Q12H    insulin aspart U-100  5 Units Subcutaneous Q6H    insulin detemir U-100  25 Units Subcutaneous BID    midodrine  10 mg Per OG tube TID    polyethylene glycol  17 g Oral Daily    senna  8.6 mg Oral Daily    sodium citrate-citric acid 500-334 mg/5 ml  30 mL Oral TID        PRN:  acetaminophen, aluminum-magnesium hydroxide-simethicone, Dextrose 10% Bolus, Dextrose 10% Bolus, fentaNYL, glucagon (human recombinant), glucose, glucose, heparin (porcine), heparin, porcine (PF), insulin aspart U-100, ondansetron, sodium chloride  0.9%      Assessment and Plan:    Zhao Fischer is a 74 y.o.male with   #) Oliguric KEYUR   - Baseline Cr around 1.1, worsened to 3.2 today  - COVID 19 positive  - Urea Na: < 20, FeNA < 0.2, BUN/Cr: >20:1  - Dehydration vs hypotension (ATN) vs post renal  - s/p 500 cc bolus   - Patient is Net +3.7 L.  cc, Lasix 60 IV x Once at 12 noon, will monitor response, may need another dose.  - Will continue to monitor  - Renally dose all medications, avoid contrast and phos based enemas.  03/27/2020  - Cr stable at 3.2, UOP: 475 ml  - Lasix 80 mg IV and reaccess  03/28/2020  - Cr improved a little 3.0  - UOP: 680 cc  - Net 3.9+  - On Lasix gtt (10 mg/hr)   03/30/2020  - Cr: 3.4 > 2.4  - UOP: 1076 > 1830 ml, Net: +2.5L  - On Lasix gtt (10 mg/hr)  - On high therapy flow CRRT regimen, Will continue daily CRRT ( 4-hours, Therapy flow rate: 5.0 L/hr and -300, Heparin gtt 500-750 units/hr if still clotting can try upto 1000 units, Monitor PTT).  03/31/2020  - Cr: 2.5 > 2.1  - UOP: 3229 ml, Net: +997.5 ml  - Changing CVVHD to CVVH to get a little better cytokine clearance  - Therapy fluid rate : 5.0 L.hr (73.9 x 60= 4.4), BFR: 250-300, UF goal; can go up to 200 ml/hr as tolerated. CVVH for 6-hours  - Give 2000 units of heparin IV stat and then heparin gtt 750 units/hr post-filter (CVVH).   04/01/2020  - Continue above regimen      Thank you for allowing us in taking care of this patient. Please call us if you have any questions regarding this patient.         Darline Dhaliwal MD  \[facility] [place] [facility]\"" Nephrology -IV  [phone]     If after 5pm please forward

## 2020-04-01 NOTE — NURSING
Have to constantly start and stop CRRT because pt keeps moving his head. Went up on Fentanyl. Will continue to monitor

## 2020-04-01 NOTE — PROGRESS NOTES
[facility]/Critical Care Resident Progress Note    Primary Team: [facility]  Attending Physician: Ezequiel Huang MD    Subjective:      Intubated: Day 9    Afebrile overnight, agitated while running CRRT yesterday. On 1.4 of precedex and 200 of fentanyl infusions, still opening eyes and looking around the room; will assess if he's more responsive. FiO2 60  PEEP 14; Attempt to wean down PEEP this morning.     Objective:     Last 24 Hour Vital Signs:  BP  Min: 77/56  Max: 144/83  Temp  Av.2 °F (36.8 °C)  Min: 97.8 °F (36.6 °C)  Max: 98.9 °F (37.2 °C)  Pulse  Av.7  Min: 61  Max: 104  Resp  Av.2  Min: 16  Max: 32  SpO2  Av %  Min: 84 %  Max: 96 %  I/O last 3 completed shifts:  In: 1507.7 [I.V.:857.7; NG/GT:650]  Out: 2960 [Urine:1459; Other:1501]    Physical Examination:  Exam limited due to strict isolation precautions  GEN: intubated, sedated; opening eyes looking around the room  HEENT: ETT appears in place, NCAT  CV: RRR, S1/S2 appreciated  Resp: Coarse breath sounds, seemingly improved  Abdomen: soft, nondistended, +BS  Ext: 2+ distal pulses, no peripheral edema  Neuro: not following commands, will attempt to wean sedation again and re-assess      Laboratory:  Trended Lab Data:  Recent Labs   Lab 20  0551 20  1123 20  0608   WBC 9.37 11.21 11.51   HGB 13.1* 12.3* 12.3*   HCT 38.9* 37.6* 38.5*    215 206       Recent Labs   Lab 20  0340 20  1123 20  0013 20  0608    142 141 142   K 3.8 4.4 4.5 4.9    103 104 105   CO2 29 31* 28 28   BUN 82* 83* 67* 83*   CREATININE 1.9* 2.1* 1.8* 2.2*   * 262* 275* 198*   CALCIUM 8.5* 8.2* 8.2* 8.5*   MG 2.5 2.5 2.5  --    PHOS 2.5* 3.8 3.1 4.5       Recent Labs   Lab 20  0536 20  0330 20  0551  20  1123 20  0013 20  0608   PROT 5.0* 5.6* 5.6*  --   --   --   --    ALBUMIN 1.7* 1.8* 1.5*   < > 1.4* 1.4* 1.4*   BILITOT 0.7 0.8 0.8   --   --   --   --    AST 28 26 32  --   --   --   --    ALT 33 27 24  --   --   --   --    ALKPHOS 104 106 97  --   --   --   --     < > = values in this interval not displayed.       No results for input(s): PROTIME, PTT, INR in the last 168 hours.    Cardiac: No results for input(s): TROPONINI, CKTOTAL, CKMB, BNP in the last 168 hours.    FLP:   Lab Results   Component Value Date    TRIG 94 03/29/2020     DM:   Lab Results   Component Value Date    HGBA1C 7.4 (H) 01/28/2020    MICROALBUR 1.3 01/28/2020    CREATININE 2.2 (H) 04/01/2020     Thyroid: No results found for: TSH, FREET4, F9QDULR, R4RICUT, THYROIDAB  Anemia: No results found for: IRON, TIBC, FERRITIN, FUXBANFQ14, FOLATE  Urinalysis:   Lab Results   Component Value Date    COLORU Yellow 03/21/2020    SPECGRAV 1.010 03/21/2020    NITRITE Negative 03/21/2020    KETONESU Negative 03/21/2020    UROBILINOGEN Negative 03/21/2020     Microbiology:  Microbiology Results (last 7 days)     Procedure Component Value Units Date/Time    Blood culture [[id number]] Collected:  03/21/20 1955    Order Status:  Completed Specimen:  Blood from Peripheral, Antecubital, Right Updated:  03/26/20 1508     Blood Culture, Routine No growth after 4 days    Blood culture [[id number]] Collected:  03/21/20 1955    Order Status:  Completed Specimen:  Blood from Peripheral, Antecubital, Left Updated:  03/26/20 1508     Blood Culture, Routine No growth after 4 days        Current Medications:     Infusions:   dexmedetomidine (PRECEDEX) infusion 1.4 mcg/kg/hr (04/01/20 0715)    fentanyl 200 mcg/hr (04/01/20 0507)    furosemide (LASIX) 2 mg/mL continuous infusion (non-titrating) Stopped (03/31/20 0420)    heparin (porcine) in 5 % dex Stopped (03/31/20 1340)        Scheduled:   albuterol-ipratropium  3 mL Nebulization Q4H    atorvastatin  40 mg Oral QHS    carvediloL  6.25 mg Oral BID    chlorhexidine  15 mL Mouth/Throat BID    clopidogreL  75 mg Oral Daily    famotidine (PF)  20  mg Intravenous Daily    fenofibrate  145 mg Oral Daily    heparin (porcine)  5,000 Units Subcutaneous Q12H    insulin aspart U-100  5 Units Subcutaneous Q6H    insulin detemir U-100  20 Units Subcutaneous BID    midodrine  10 mg Per OG tube TID    polyethylene glycol  17 g Oral Daily    senna  8.6 mg Oral Daily    sodium citrate-citric acid 500-334 mg/5 ml  30 mL Oral TID        PRN:  acetaminophen, aluminum-magnesium hydroxide-simethicone, Dextrose 10% Bolus, Dextrose 10% Bolus, fentaNYL, glucagon (human recombinant), glucose, glucose, heparin (porcine), heparin, porcine (PF), insulin aspart U-100, ondansetron, sodium chloride 0.9%    Assessment:     Zhao Fischer is a 74 y.o.male with COPD, CKD, T2DM, HTN, HLD, PAD who was found to be COVID19+, intubated and currently in the ICU.     Plan:     Neuro:  #Intubated; Sedated  #Uremia  - On Fentanyl 200, Precedex 1.4 this morning  - Tolerated 6 hours of CVVH last night; will run again today     Cardiovascular/Hemodynamics:  #Sepsis  #COVID19  #PAD  #HTN  - Not currently requiring pressors  - Holding amlodipine and lisinopril  - Hypertensive, started coreg 6.25mg PO BID  - Normotensive this morning, will hold off on uptitrating or adding another agent     Respiratory:   #Acute Hypoxic Respiratory Failure  #COVID19  - Intubated 3/23/20  - Continue low tidal volume strategy  - FiO2 60, PEEP 14, Sats 88-92%. Wean as able  - Continue bronchodilators  - SAT this morning  - Received 6 days of azithromycin, 2 days of rocephin, 3.5 days of HCQ     GI/FEN:  Electrolytes stable  Nutrition: Tube Feeds: Received 275cc over the past 24 hours     ID:   #[id number]  - completed 3.5 days of HCQ     Renal:   #ARF on CKD  - Cr starting to uptrend  - UOP starting to decline  - 1.1L net down over the past 24 hours  - Restart lasix gtt this morning     Heme/Onc:   Hgb stable     Endocrine:  #DM2  - Levemir 25 U BID today, + SSI  - Goal 140-180     Prophylaxis:   VTE: Heparin   Stress  Ulcer: Pepcid     Lines/Drains:  ETT 3/23/20  OG  Roca  PIV x3  Trialysis [facility] 3/27/20    Real Felix MD  [facility]  [facility]/Critical Care Service

## 2020-04-01 NOTE — PROGRESS NOTES
[facility] Hospital Medicine  Progress Note    Patient Name: Zhao Fischer  MRN: [id number]  Patient Class: IP- Inpatient   Admission Date: 3/21/2020  Length of Stay: 9 days  Attending Physician: Ezequiel Huang MD  Primary Care Provider: Mannie Russell MD        Subjective:     Principal Problem:Pneumonia due to severe acute respiratory syndrome coronavirus 2 (SARS-CoV-2)        HPI:  Zhao Fischer is a 74 year old white man with hypertension, hyperlipidemia, diabetes mellitus type 2, peripheral artery disease, arteriosclerotic cardiovascular disease. He lives in [place], [place]. He has been  since he was 16 years old. His primary care physician is Dr. Mannie Russell.    He contacted Dr. Russell on 3/16/2020 to request testing for COVID-19 due to nonproductive cough, shortness of breath, and fever for the past day, but he could not get tested due to limited outpatient testing. He was prescribed levofloxacin 500 mg daily.    He presented to [facility] Emergency [facility] on 3/21/2020 with persistent symptoms, including diarrhea, which is common with COVID-19 infection, and poor appetite. He reported that his wife had similar symptoms. He was hypoxic, with oxygen saturation of 88%. Chest X-ray showed interstitial lung opacities. WBC count was low at 2780. BNP, lactate, and procalcitonin were low. CRP was elevated at 154 mg/L. COVID-19 testing has been liberal in the emergency department (criteria of cough alone is enough) so he was tested. He was not given anything other than supplemental oxygen in the emergency department. He was admitted to [facility].     Overview/Hospital Course:  He was put on hydroxychloroquine and azithromycin. His hypoxia worsened overnight between 3/22/2020 and 3/23/2020. Pulmonology was consulted and he was intubated. COVID-19 result was reported positive on 3/24/2020. He developed acute kidney injury on 3/24/2020.  Palliative Care was consulted. Hydroxychloroquine was stopped early due to hypoglycemia. Nephrology was consulted on 3/26/2020. A left internal jugular trialysis catheter was placed on 3/27/2020. He finished a course of azithromycin on 3/27/2020. He started continuous renal replacement therapy and was given furosemide infusion to augment urine output. Pulmonology diagnosed him with chronic obstructive pulmonary disease and started albuterol-ipratropium nebulizer treatments.    Interval History: On assist control, rate 24, tidal volume 420, FiO2 60%, PEEP 10. SpO2 94%. On fentanyl, dexmedetomidine.    Review of Systems   Unable to perform ROS: Intubated     Objective:     Vital Signs (Most Recent):  Temp: 97.9 °F (36.6 °C) (04/01/20 0330)  Pulse: 76 (04/01/20 1015)  Resp: (!) 26 (04/01/20 1015)  BP: 106/62 (04/01/20 1015)  SpO2: (!) 94 % (04/01/20 1015) Vital Signs (24h Range):  Temp:  [97.8 °F (36.6 °C)-98.9 °F (37.2 °C)] 97.9 °F (36.6 °C)  Pulse:  [] 76  Resp:  [16-32] 26  SpO2:  [86 %-96 %] 94 %  BP: ()/(50-86) 106/62     Weight: 73.9 kg (162 lb 14.7 oz)  Body mass index is 24.77 kg/m².    Intake/Output Summary (Last 24 hours) at 4/1/2020 1148  Last data filed at 4/1/2020 0908  Gross per 24 hour   Intake 451.73 ml   Output 839 ml   Net -387.27 ml      Physical Exam   Constitutional: He appears well-developed. He is sedated and intubated.   Pulmonary/Chest: Effort normal. He is intubated. No respiratory distress.   Genitourinary:   Genitourinary Comments: Urine in Roca catheter bag   Nursing note and vitals reviewed.      Significant Labs: All pertinent labs within the past 24 hours have been reviewed.   Recent Labs   Lab 03/27/20  0536 03/28/20  0330 03/29/20  0551  03/31/20  1123 04/01/20  0013 04/01/20  0608   * 128* 129*   < > 142 141 142   K 4.7 5.1 5.9*   < > 4.4 4.5 4.9   CL 97 98 98   < > 103 104 105   CO2 18* 17* 20*   < > 31* 28 28   BUN 91* 95* 103*   < > 83* 67* 83*   CREATININE 3.2*  3.0* 3.4*   < > 2.1* 1.8* 2.2*   CALCIUM 7.7* 8.2* 8.2*   < > 8.2* 8.2* 8.5*   PROT 5.0* 5.6* 5.6*  --   --   --   --    BILITOT 0.7 0.8 0.8  --   --   --   --    ALKPHOS 104 106 97  --   --   --   --    ALT 33 27 24  --   --   --   --    AST 28 26 32  --   --   --   --     < > = values in this interval not displayed.       Significant Imaging: I have reviewed all pertinent imaging results/findings within the past 24 hours.       Assessment/Plan:      * Pneumonia due to severe acute respiratory syndrome coronavirus 2 (SARS-CoV-2)  Acute respiratory distress syndrome (ARDS) due to COVID-19 virus  Acute respiratory failure with hypoxia  Appreciate Infectious Disease and Pulmonology. Treated with hydroxychloroquine, azithromycin, ceftriaxone. Still intubated.    Chronic obstructive pulmonary disease  Newly diagnosed by Pulmonology. Getting albuterol-ipratropium nebulizer treatments.    KEYUR (acute kidney injury)  Appreciate Nephrology. Getting sodium citrate-citric acid. Getting CRRT, furosemide infusion.    Acute respiratory failure with hypoxia  See primary problem.    Essential hypertension  ASCVD (arteriosclerotic cardiovascular disease)  Hyperlipidemia   PAD (peripheral artery disease)  Continue atorvastatin 40 mg daily, clopidrogel 75 mg daily. Hold home metoprolol succinate, amlodipine, lisinopril.    Type 2 diabetes mellitus with diabetic peripheral angiopathy without gangrene, without long-term current use of insulin  Hold home glimepiride, empagliflozin. Giving insulin detemir 25 units twice daily, insulin aspart 5 units every 6 hours, insulin aspart sliding scale. Monitor Accuchecks.      VTE Risk Mitigation (From admission, onward)         Ordered     heparin (porcine) injection 2,000 Units  As needed (PRN)      03/31/20 0107     heparin, porcine (PF) 100 unit/mL injection flush 200 Units  As needed (PRN)      03/29/20 1459     heparin 25,000 units in dextrose 5% 250 mL (100 units/mL) infusion (heparin  infusion - NO NOMOGRAM)  Continuous      03/29/20 1332     heparin (porcine) injection 5,000 Units  Every 12 hours      03/23/20 1357     IP VTE HIGH RISK PATIENT  Once      03/21/20 2227     Place sequential compression device  Until discontinued      03/21/20 2227                Critical care time spent on the evaluation and treatment of severe organ dysfunction, review of pertinent labs and imaging studies, discussions with consulting providers and discussions with patient/family: 32 minutes.      Ezequiel Huang MD  Department of Hospital Medicine   [facility]

## 2020-04-01 NOTE — ASSESSMENT & PLAN NOTE
Acute respiratory distress syndrome (ARDS) due to COVID-19 virus  Acute respiratory failure with hypoxia  Appreciate Infectious Disease and Pulmonology. Treated with hydroxychloroquine, azithromycin, ceftriaxone. Still intubated.

## 2020-04-01 NOTE — ASSESSMENT & PLAN NOTE
Hold home glimepiride, empagliflozin. Giving insulin detemir 25 units twice daily, insulin aspart 5 units every 6 hours, insulin aspart sliding scale. Monitor Accuchecks.

## 2020-04-01 NOTE — SUBJECTIVE & OBJECTIVE
Interval History: On assist control, rate 24, tidal volume 420, FiO2 60%, PEEP 10. SpO2 94%. On fentanyl, dexmedetomidine.    Review of Systems   Unable to perform ROS: Intubated     Objective:     Vital Signs (Most Recent):  Temp: 97.9 °F (36.6 °C) (04/01/20 0330)  Pulse: 76 (04/01/20 1015)  Resp: (!) 26 (04/01/20 1015)  BP: 106/62 (04/01/20 1015)  SpO2: (!) 94 % (04/01/20 1015) Vital Signs (24h Range):  Temp:  [97.8 °F (36.6 °C)-98.9 °F (37.2 °C)] 97.9 °F (36.6 °C)  Pulse:  [] 76  Resp:  [16-32] 26  SpO2:  [86 %-96 %] 94 %  BP: ()/(50-86) 106/62     Weight: 73.9 kg (162 lb 14.7 oz)  Body mass index is 24.77 kg/m².    Intake/Output Summary (Last 24 hours) at 4/1/2020 1148  Last data filed at 4/1/2020 0908  Gross per 24 hour   Intake 451.73 ml   Output 839 ml   Net -387.27 ml      Physical Exam   Constitutional: He appears well-developed. He is sedated and intubated.   Pulmonary/Chest: Effort normal. He is intubated. No respiratory distress.   Genitourinary:   Genitourinary Comments: Urine in Roca catheter bag   Nursing note and vitals reviewed.      Significant Labs: All pertinent labs within the past 24 hours have been reviewed.   Recent Labs   Lab 03/27/20  0536 03/28/20  0330 03/29/20  0551  03/31/20  1123 04/01/20  0013 04/01/20  0608   * 128* 129*   < > 142 141 142   K 4.7 5.1 5.9*   < > 4.4 4.5 4.9   CL 97 98 98   < > 103 104 105   CO2 18* 17* 20*   < > 31* 28 28   BUN 91* 95* 103*   < > 83* 67* 83*   CREATININE 3.2* 3.0* 3.4*   < > 2.1* 1.8* 2.2*   CALCIUM 7.7* 8.2* 8.2*   < > 8.2* 8.2* 8.5*   PROT 5.0* 5.6* 5.6*  --   --   --   --    BILITOT 0.7 0.8 0.8  --   --   --   --    ALKPHOS 104 106 97  --   --   --   --    ALT 33 27 24  --   --   --   --    AST 28 26 32  --   --   --   --     < > = values in this interval not displayed.       Significant Imaging: I have reviewed all pertinent imaging results/findings within the past 24 hours.

## 2020-04-02 NOTE — ASSESSMENT & PLAN NOTE
Acute respiratory distress syndrome (ARDS) due to COVID-19 virus  Acute respiratory failure with hypoxia  Appreciate Infectious Disease and Pulmonology. Treated with hydroxychloroquine, azithromycin, ceftriaxone. Still intubated. Chest X-ray shows persistent lung disease.

## 2020-04-02 NOTE — PROGRESS NOTES
[facility] Progress Note    Subjective:      Zhao Fischer is a 74 y.o.  male who is being followed by the [facility] service at [facility] for KEYUR.    Patient is intubated, sedated and COVID positive.      Objective:   Last 24 Hour Vital Signs:  BP  Min: 70/51  Max: 177/81  Temp  Av.4 °F (37.4 °C)  Min: 99 °F (37.2 °C)  Max: 99.9 °F (37.7 °C)  Pulse  Av.7  Min: 61  Max: 113  Resp  Av.7  Min: 14  Max: 33  SpO2  Av.4 %  Min: 84 %  Max: 97 %  I/O last 3 completed shifts:  In: 1624.1 [I.V.:1224.1; NG/GT:400]  Out: 2452 [Urine:1825; Other:627]    Physical Examination:  COVID positive, Exam deferred.    Laboratory:  Laboratory Data Reviewed: yes    Microbiology Data Reviewed: yes    Radiology Data Reviewed: yes    Current Medications:     Infusions:   dexmedetomidine (PRECEDEX) infusion 1.4 mcg/kg/hr (20 0508)    fentanyl 30 mL/hr at 20 0504    furosemide (LASIX) 2 mg/mL continuous infusion (non-titrating) 10 mg/hr (20 1831)    heparin (porcine) in 5 % dex 750 Units/hr (20 2355)    ketamine (KETALAR) infusion (titrating) Stopped (20 0800)        Scheduled:   albuterol-ipratropium  3 mL Nebulization Q4H    atorvastatin  40 mg Oral QHS    carvediloL  6.25 mg Oral BID    chlorhexidine  15 mL Mouth/Throat BID    clopidogreL  75 mg Oral Daily    famotidine (PF)  20 mg Intravenous Daily    fenofibrate  145 mg Oral Daily    heparin (porcine)  5,000 Units Subcutaneous Q12H    insulin detemir U-100  20 Units Subcutaneous BID    midodrine  10 mg Per OG tube TID    polyethylene glycol  17 g Oral Daily    senna  8.6 mg Oral Daily    sodium citrate-citric acid 500-334 mg/5 ml  30 mL Oral TID        PRN:  acetaminophen, aluminum-magnesium hydroxide-simethicone, Dextrose 10% Bolus, Dextrose 10% Bolus, fentaNYL, glucagon (human recombinant), glucose, glucose, heparin (porcine), heparin, porcine (PF), insulin aspart U-100, lorazepam,  metoprolol, ondansetron, sodium chloride 0.9%      Assessment and Plan:    Zhao Fischer is a 74 y.o.male with   #) Oliguric KEYUR   - Baseline Cr around 1.1, worsened to 3.2 today  - COVID 19 positive  - Urea Na: < 20, FeNA < 0.2, BUN/Cr: >20:1  - Dehydration vs hypotension (ATN) vs post renal  - s/p 500 cc bolus   - Patient is Net +3.7 L.  cc, Lasix 60 IV x Once at 12 noon, will monitor response, may need another dose.  - Will continue to monitor  - Renally dose all medications, avoid contrast and phos based enemas.  03/27/2020  - Cr stable at 3.2, UOP: 475 ml  - Lasix 80 mg IV and reaccess  03/28/2020  - Cr improved a little 3.0  - UOP: 680 cc  - Net 3.9+  - On Lasix gtt (10 mg/hr)   03/30/2020  - Cr: 3.4 > 2.4  - UOP: 1076 > 1830 ml, Net: +2.5L  - On Lasix gtt (10 mg/hr)  - On high therapy flow CRRT regimen, Will continue daily CRRT ( 4-hours, Therapy flow rate: 5.0 L/hr and -300, Heparin gtt 500-750 units/hr if still clotting can try upto 1000 units, Monitor PTT).  03/31/2020  - Cr: 2.5 > 2.1  - UOP: 3229 ml, Net: +997.5 ml  - Changing CVVHD to CVVH to get a little better cytokine clearance  - Therapy fluid rate : 5.0 L.hr (73.9 x 60= 4.4), BFR: 250-300, UF goal; can go up to 200 ml/hr as tolerated. CVVH for 6-hours  - Give 2000 units of heparin IV stat and then heparin gtt 750 units/hr post-filter (CVVH).   04/01/2020  - Continue above regimen   04/02/2020  - Continue above      Thank you for allowing us in taking care of this patient. Please call us if you have any questions regarding this patient.         Darline Dhaliwal MD  [facility]  [phone]    If after 5pm please forward any questions to the [facility] Fellow/Attending on call.

## 2020-04-02 NOTE — NURSING
Have CRRT machine primed and ready but pt's BP low. Will hold off hooking pt up to CRRT while MAP around 65

## 2020-04-02 NOTE — SUBJECTIVE & OBJECTIVE
Interval History: On assist control, rate 24, tidal volume 420, FiO2 70%, PEEP 12. SpO2 92%. On fentanyl, dexmedetomidine.     Review of Systems   Unable to perform ROS: Intubated     Objective:     Vital Signs (Most Recent):  Temp: 99.4 °F (37.4 °C) (04/02/20 0715)  Pulse: 64 (04/02/20 0830)  Resp: (!) 22 (04/02/20 0830)  BP: (!) 113/59 (04/02/20 0830)  SpO2: (!) 90 % (04/02/20 0830) Vital Signs (24h Range):  Temp:  [98 °F (36.7 °C)-99.9 °F (37.7 °C)] 99.4 °F (37.4 °C)  Pulse:  [] 64  Resp:  [14-33] 22  SpO2:  [84 %-97 %] 90 %  BP: ()/(50-93) 113/59     Weight: 73.9 kg (162 lb 14.7 oz)  Body mass index is 24.77 kg/m².    Intake/Output Summary (Last 24 hours) at 4/2/2020 1110  Last data filed at 4/2/2020 0600  Gross per 24 hour   Intake 1524.13 ml   Output 1675 ml   Net -150.87 ml      Physical Exam   Constitutional: He appears well-developed. He is sedated and intubated.   Cardiovascular: Regular rhythm. Tachycardia present.   Pulmonary/Chest: Effort normal. He is intubated. No respiratory distress.   Genitourinary:   Genitourinary Comments: Urine in Roca catheter bag   Nursing note and vitals reviewed.      Significant Labs: All pertinent labs within the past 24 hours have been reviewed.   Recent Labs   Lab 03/27/20  0536 03/28/20  0330 03/29/20  0551  04/01/20  0608 04/01/20  1804 04/02/20  0459   * 128* 129*   < > 142 145  145 147*   K 4.7 5.1 5.9*   < > 4.9 4.3  4.3 4.7   CL 97 98 98   < > 105 107  107 108   CO2 18* 17* 20*   < > 28 29  29 29   BUN 91* 95* 103*   < > 83* 88*  88* 98*   CREATININE 3.2* 3.0* 3.4*   < > 2.2* 2.1*  2.1* 2.4*   CALCIUM 7.7* 8.2* 8.2*   < > 8.5* 7.9*  7.9* 8.0*   PROT 5.0* 5.6* 5.6*  --   --   --   --    BILITOT 0.7 0.8 0.8  --   --   --   --    ALKPHOS 104 106 97  --   --   --   --    ALT 33 27 24  --   --   --   --    AST 28 26 32  --   --   --   --     < > = values in this interval not displayed.       Significant Imaging: I have reviewed all pertinent  imaging results/findings within the past 24 hours.   X-Ray Chest AP Portable 4/02/20: FINDINGS: Devices: Endotracheal tube tip is in good location between the thoracic inlet and pablo; patient was intubated on 03/23/2020.  NG type tube crosses the GE junction; proximal port lies near the GE junction.  Multifocal patchy subsegmental opacities are present in both lungs with peripheral and basilar predominance.  The extent of disease appears similar to recent studies dating back to 03/23/2020.  Mediastinal structures are midline; mediastinal contours are unremarkable.  I detect no new pulmonary disease and no pleural fluid, pneumothorax, pneumomediastinum, pneumoperitoneum or significant osseous abnormality.  Impression: Abnormal but stable appearance of the chest radiograph compared to prior examinations dating back to 03/23/2020.  Endotracheal tube tip is in good location.  Proximal port of the NG tube lies near the GE junction; this device might be advanced 10 cm.

## 2020-04-02 NOTE — PLAN OF CARE
Repeat chest X-ray on 4/2/2020 showed similar lung opacities as recent X-rays.     Interval History: On assist control, rate 24, tidal volume 420, FiO2 70%, PEEP 12. SpO2 92%. On fentanyl, dexmedetomidine.    The pt remains in ICU,intubated. The [facility] will continue to follow the pt throughout his transitions of care and will assist with any d/c needs.       04/02/20 1624   Discharge Reassessment   Assessment Type Discharge Planning Reassessment   Provided patient/caregiver education on the expected discharge date and the discharge plan No   Do you have any problems affording any of your prescribed medications? No   Discharge Plan A Home Health   Discharge Plan B Skilled Nursing Facility   DME Needed Upon Discharge    (TBD)   Patient choice form signed by patient/caregiver N/A   Anticipated Discharge Disposition Home-Health   Can the patient/caregiver answer the patient profile reliably? No, cognitively impaired   How does the patient rate their overall health at the present time? Poor   Describe the patient's ability to walk at the present time. Does not walk or unable to take any steps at all   How often would a person be available to care for the patient? Whenever needed   Number of comorbid conditions (as recorded on the chart) Three   During the past month, has the patient often been bothered by feeling down, depressed or hopeless? No   During the past month, has the patient often been bothered by little interest or pleasure in doing things? No   Post-Acute Status   Post-Acute Authorization Placement;Home Health

## 2020-04-02 NOTE — PROGRESS NOTES
"[facility]  Adult Nutrition  Progress Note    SUMMARY       Recommendations    Recommendation:   1. Consider changing TF formula to Impact Peptide 1.5 and advance as tolerated to goal rate of 50ml/hr to provide 1800 kcal, 113g protein, & 924ml free water.   2. Will follow to monitor TF tolerance and nutrition status.    Goals:   1. Pt will tolerate TF.   2. TF will meet at least 85% estimated kcal/protein needs    Nutrition Goal Status: progressing towards goal, goal not met  Communication of RD Recs: other (comment)(POC)    Reason for Assessment    Reason For Assessment: RD follow-up  Diagnosis: (pneumonia/COVID)  Relevant Medical History: HTN, DM, HLD, PAD, angioplasty, cardiac catherization  Interdisciplinary Rounds: did not attend  General Information Comments: Pt remains intubated on vent. Receiving CRRT. OG tube. Receiving TF of Isosource 1.5 at 25ml/hr. COVID +.  Nutrition Discharge Planning: d/c needs to be determined    Nutrition Risk Screen    Nutrition Risk Screen: tube feeding or parenteral nutrition    Nutrition/Diet History    Food Preferences: unable to assess  Spiritual, Cultural Beliefs, Baptism Practices, Values that Affect Care: yes  Factors Affecting Nutritional Intake: NPO, on mechanical ventilation    Anthropometrics    Temp: 99.4 °F (37.4 °C)  Height Method: Stated  Height: 5' 8" (172.7 cm)  Height (inches): 68 in  Weight Method: Bed Scale  Weight: 73.9 kg (162 lb 14.7 oz)  Weight (lb): 162.92 lb  Ideal Body Weight (IBW), Male: 154 lb  % Ideal Body Weight, Male (lb): 105.79 %  BMI (Calculated): 24.8  BMI Grade: 18.5-24.9 - normal       Lab/Procedures/Meds    Pertinent Labs Reviewed: reviewed  Pertinent Labs Comments: H/H 12.4/38.7, Na 147, BUN 98, Cr 2.4, eGFR 26, Ca 8.0, Alb 1.4  Pertinent Medications Reviewed: reviewed  Pertinent Medications Comments: statin, carvedilol, chlorhexidine, clopidogrel, famotidine, heparin, insulin detemir, midodrine, poly glycol, senna, na " citratre, precedex, fenrtanyl, furosemide, heparin, ketamine    Physical Findings/Assessment    Pilo Score 15  Pressure Injury   Right medial Buttocks Stage 1    Estimated/Assessed Needs    Weight Used For Calorie Calculations: 73.9 kg (162 lb 14.7 oz)  Energy Calorie Requirements (kcal): 1886  Energy Need Method: [facility]  Protein Requirements: 96g (1.3g.kg)  Weight Used For Protein Calculations: 73.9 kg (162 lb 14.7 oz)     Estimated Fluid Requirement Method: RDA Method  RDA Method (mL): 1886  CHO Requirement: 225f      Nutrition Prescription Ordered    Current Diet Order: NPO  Current Nutrition Support Formula Ordered: Isosource 1.5  Current Nutrition Support Rate Ordered: 25 (ml)  Current Nutrition Support Frequency Ordered: ml/hr    Evaluation of Received Nutrient/Fluid Intake    Enteral Calories (kcal): 900  Enteral Protein (gm): 41  Enteral (Free Water) Fluid (mL): 458  Lipid Calories (kcals): 0 kcals  Other Calories (kcal): 0  Total Calories (kcal): 900  % Kcal Needs: 48  % Protein Needs: 43  I/O: 1624.1/1675  Energy Calories Required: not meeting needs  Protein Required: not meeting needs  Fluid Required: not meeting needs  Comments: LBM 3/22  Tolerance: tolerating  % Intake of Estimated Energy Needs: 25 - 50 %  % Meal Intake: NPO    Nutrition Risk    Level of Risk/Frequency of Follow-up: (2xweekly)     Assessment and Plan    Acute respiratory distress syndrome (ARDS) due to COVID-19 virus  Contributing Nutrition Diagnosis  Inadequate energy intake    Related to (etiology):   intubation    Signs and Symptoms (as evidenced by):   NPO    Interventions:  Enteral nutrition    Nutrition Diagnosis Status:   Continues           Monitor and Evaluation    Food and Nutrient Intake: energy intake, food and beverage intake, enteral nutrition intake  Food and Nutrient Adminstration: diet order, enteral and parenteral nutrition administration  Knowledge/Beliefs/Attitudes: food and nutrition knowledge/skill  Physical  Activity and Function: nutrition-related ADLs and IADLs  Anthropometric Measurements: weight, weight change, body mass index  Biochemical Data, Medical Tests and Procedures: electrolyte and renal panel, gastrointestinal profile, glucose/endocrine profile, inflammatory profile, lipid profile  Nutrition-Focused Physical Findings: overall appearance     Malnutrition Assessment     NFPE not performed, patient has been screened for possible COVID-19 and has been placed on airborne and contact precautions.  Patient is noted as being positive for COVID-19.    Nutrition Follow-Up    RD Follow-up?: Yes

## 2020-04-02 NOTE — PROGRESS NOTES
[facility] Pulmonary/Critical Care Resident Progress Note    Primary Team: [facility]  Attending Physician: Ezequiel Huang MD    Subjective:      Intubated Day 10    Afebrile overnight, episode of hypoglycemia to 68. Still running on CRRT this morning; will continue sedation this morning as patient continues to move and becomes agitated and CRRT line clots. Wean oxygen requirements as able.     Objective:     Last 24 Hour Vital Signs:  BP  Min: 70/51  Max: 177/81  Temp  Av.1 °F (37.3 °C)  Min: 98 °F (36.7 °C)  Max: 99.9 °F (37.7 °C)  Pulse  Av.4  Min: 69  Max: 109  Resp  Av.4  Min: 14  Max: 33  SpO2  Av.2 %  Min: 84 %  Max: 97 %  I/O last 3 completed shifts:  In: 1624.1 [I.V.:1224.1; NG/GT:400]  Out: 2452 [Urine:1825; Other:627]    Physical Examination:  Exam limited due to strict isolation precautions  GEN: intubated, sedated; opening eyes looking around the room  HEENT: ETT appears in place, NCAT  CV: RRR, S1/S2 appreciated  Resp: Coarse breath sounds, seemingly improved; appears comfortable mechanically ventilated  Abdomen: soft, nondistended, +BS  Ext: 2+ distal pulses, no peripheral edema  Neuro: not following commands, will attempt to wean sedation again and re-assess        Laboratory:  Trended Lab Data:  Recent Labs   Lab 20  1123 20  0608 20  0459   WBC 11.21 11.51 17.28*   HGB 12.3* 12.3* 12.4*   HCT 37.6* 38.5* 38.7*    206 205       Recent Labs   Lab 20  0013 20  0608 20  1804 20  0459    142 145  145 147*   K 4.5 4.9 4.3  4.3 4.7    105 107  107 108   CO2 28 28 29  29 29   BUN 67* 83* 88*  88* 98*   CREATININE 1.8* 2.2* 2.1*  2.1* 2.4*   * 198* 105  105 107   CALCIUM 8.2* 8.5* 7.9*  7.9* 8.0*   MG 2.5  --  2.4  2.4  2.4 2.6   PHOS 3.1 4.5 3.5  3.5 3.3       Recent Labs   Lab 20  0536 20  0330 20  0551  20  0608 20  1804 20  0459   PROT 5.0* 5.6* 5.6*   --   --   --   --    ALBUMIN 1.7* 1.8* 1.5*   < > 1.4* 1.3*  1.3* 1.4*   BILITOT 0.7 0.8 0.8  --   --   --   --    AST 28 26 32  --   --   --   --    ALT 33 27 24  --   --   --   --    ALKPHOS 104 106 97  --   --   --   --     < > = values in this interval not displayed.       No results for input(s): PROTIME, PTT, INR in the last 168 hours.    Cardiac: No results for input(s): TROPONINI, CKTOTAL, CKMB, BNP in the last 168 hours.    FLP:   Lab Results   Component Value Date    TRIG 94 03/29/2020     DM:   Lab Results   Component Value Date    HGBA1C 7.4 (H) 01/28/2020    MICROALBUR 1.3 01/28/2020    CREATININE 2.4 (H) 04/02/2020     Thyroid: No results found for: TSH, FREET4, E1TMAMH, O4RISWF, THYROIDAB  Anemia: No results found for: IRON, TIBC, FERRITIN, WJWVHDDK19, FOLATE  Urinalysis:   Lab Results   Component Value Date    COLORU Yellow 03/21/2020    SPECGRAV 1.010 03/21/2020    NITRITE Negative 03/21/2020    KETONESU Negative 03/21/2020    UROBILINOGEN Negative 03/21/2020     Microbiology:  Microbiology Results (last 7 days)     Procedure Component Value Units Date/Time    Blood culture [[id number]] Collected:  03/21/20 1955    Order Status:  Completed Specimen:  Blood from Peripheral, Antecubital, Right Updated:  03/26/20 1508     Blood Culture, Routine No growth after 4 days    Blood culture [[id number]] Collected:  03/21/20 1955    Order Status:  Completed Specimen:  Blood from Peripheral, Antecubital, Left Updated:  03/26/20 1508     Blood Culture, Routine No growth after 4 days        Current Medications:     Infusions:   dexmedetomidine (PRECEDEX) infusion 1.4 mcg/kg/hr (04/02/20 0508)    fentanyl 30 mL/hr at 04/02/20 0504    furosemide (LASIX) 2 mg/mL continuous infusion (non-titrating) 10 mg/hr (04/01/20 1831)    heparin (porcine) in 5 % dex 750 Units/hr (04/01/20 5400)    ketamine (KETALAR) infusion (titrating)          Scheduled:   albuterol-ipratropium  3 mL Nebulization Q4H    atorvastatin  40  mg Oral QHS    carvediloL  6.25 mg Oral BID    chlorhexidine  15 mL Mouth/Throat BID    clopidogreL  75 mg Oral Daily    famotidine (PF)  20 mg Intravenous Daily    fenofibrate  145 mg Oral Daily    heparin (porcine)  5,000 Units Subcutaneous Q12H    insulin aspart U-100  5 Units Subcutaneous Q6H    insulin detemir U-100  25 Units Subcutaneous BID    midodrine  10 mg Per OG tube TID    polyethylene glycol  17 g Oral Daily    senna  8.6 mg Oral Daily    sodium citrate-citric acid 500-334 mg/5 ml  30 mL Oral TID        PRN:  acetaminophen, aluminum-magnesium hydroxide-simethicone, Dextrose 10% Bolus, Dextrose 10% Bolus, fentaNYL, glucagon (human recombinant), glucose, glucose, heparin (porcine), heparin, porcine (PF), insulin aspart U-100, lorazepam, ondansetron, sodium chloride 0.9%      Assessment:     Zhao Fischer is a 74 y.o.male with COPD, CKD, T2DM, HTN, HLD, PAD who was found to be COVID19+, intubated and currently in the ICU.     Plan:     Neuro:  #Intubated; Sedated  #Uremia  - On fentanyl, precedex, and propofol this morning  - Running CRRT this morning; will wean sedation after completion     Cardiovascular/Hemodynamics:  #Sepsis 2/2 COVID19  #PAD  #HTN  - Not currently requiring pressors  - Holding amlodipine and lisinopril   - Hypertensive, started coreg 6.25mg PO BID  - Normotensive this morning, will hold off on uptitrating or adding another agent     Respiratory:   #Acute Hypoxic Respiratory Failure  #COVID19  - Intubated 3/23/20  - Continue low tidal volume strategy, pplat <30  - Continue bronchodilators  - SAT/SBT this morning  - Received 6 days of azithromycin, 2 days of rocephin, 3.5 days of HCQ     GI/FEN:  Electrolytes stable  Nutrition: Tube Feeds: Received 400cc over the past 24 hours     ID:   #COVID19  - completed 3.5 days of HCQ     Renal:   #ARF on CKD  - Cr starting to uptrend  - Net even over the past 24 hours  - CRRT this morning     Heme/Onc:   Hgb  stable     Endocrine:  #DM2  - Levemir 22 U BID today, + SSI  - Goal 140-180     Prophylaxis:   VTE: Heparin   Stress Ulcer: Pepcid     Lines/Drains:  ETT 3/23/20  OG  Roca  PIV x3  Trialysis [facility] 3/27/20    Real Felix MD  [facility]II  [facility]/Critical Care Service

## 2020-04-02 NOTE — PLAN OF CARE
Recommendation:   1. Consider changing TF formula to Impact Peptide 1.5 and advance as tolerated to goal rate of 50ml/hr to provide 1800 kcal, 113g protein, & 924ml free water.   2. Will follow to monitor TF tolerance and nutrition status.    Goals:   1. Pt will tolerate TF.   2. TF will meet at least 85% estimated kcal/protein needs    Nutrition Goal Status: progressing towards goal, goal not met  Communication of RD Recs: other (comment)(POC)      Problem: Nutrition Impairment (Mechanical Ventilation, Invasive)  Goal: Optimal Nutrition Delivery  Outcome: Ongoing, Progressing     Problem: Feeding Intolerance (Enteral Nutrition)  Goal: Feeding Tolerance  Outcome: Ongoing, Progressing     Problem: Wound  Goal: Optimal Wound Healing  Outcome: Ongoing, Progressing     Problem: Oral Intake Inadequate (Acute Kidney Injury/Impairment)  Goal: Optimal Nutrition Intake  Outcome: Ongoing, Progressing

## 2020-04-02 NOTE — PROGRESS NOTES
[facility] Hospital Medicine  Progress Note    Patient Name: Zhao Fischer  MRN: [id number]  Patient Class: IP- Inpatient   Admission Date: 3/21/2020  Length of Stay: 10 days  Attending Physician: Ezequiel Huang MD  Primary Care Provider: Mannie Russell MD        Subjective:     Principal Problem:Pneumonia due to severe acute respiratory syndrome coronavirus 2 (SARS-CoV-2)        HPI:  Zhao Fischer is a 74 year old white man with hypertension, hyperlipidemia, diabetes mellitus type 2, peripheral artery disease, arteriosclerotic cardiovascular disease. He lives in [place], [place]. He has been  since he was 16 years old. His primary care physician is Dr. Mannie Russell.    He contacted Dr. Russell on 3/16/2020 to request testing for COVID-19 due to nonproductive cough, shortness of breath, and fever for the past day, but he could not get tested due to limited outpatient testing. He was prescribed levofloxacin 500 mg daily.    He presented to [facility] Emergency Department on 3/21/2020 with persistent symptoms, including diarrhea, which is common with COVID-19 infection, and poor appetite. He reported that his wife had similar symptoms. He was hypoxic, with oxygen saturation of 88%. Chest X-ray showed interstitial lung opacities. WBC count was low at 2780. BNP, lactate, and procalcitonin were low. CRP was elevated at 154 mg/L. COVID-19 testing has been liberal in the emergency department (criteria of cough alone is enough) so he was tested. He was not given anything other than supplemental oxygen in the emergency department. He was admitted to [facility].     Overview/Hospital Course:  He was put on hydroxychloroquine and azithromycin. His hypoxia worsened overnight between 3/22/2020 and 3/23/2020. Pulmonology was consulted and he was intubated. COVID-19 result was reported positive on 3/24/2020. He developed acute kidney injury on 3/24/2020.  Palliative Care was consulted. Hydroxychloroquine was stopped early due to hypoglycemia. Nephrology was consulted on 3/26/2020. A left internal jugular trialysis catheter was placed on 3/27/2020. He finished a course of azithromycin on 3/27/2020. He started continuous renal replacement therapy and was given furosemide infusion to augment urine output. Pulmonology diagnosed him with chronic obstructive pulmonary disease and started albuterol-ipratropium nebulizer treatments. Repeat chest X-ray on 4/2/2020 showed similar lung opacities as recent X-rays.    Interval History: On assist control, rate 24, tidal volume 420, FiO2 70%, PEEP 12. SpO2 92%. On fentanyl, dexmedetomidine.     Review of Systems   Unable to perform ROS: Intubated     Objective:     Vital Signs (Most Recent):  Temp: 99.4 °F (37.4 °C) (04/02/20 0715)  Pulse: 64 (04/02/20 0830)  Resp: (!) 22 (04/02/20 0830)  BP: (!) 113/59 (04/02/20 0830)  SpO2: (!) 90 % (04/02/20 0830) Vital Signs (24h Range):  Temp:  [98 °F (36.7 °C)-99.9 °F (37.7 °C)] 99.4 °F (37.4 °C)  Pulse:  [] 64  Resp:  [14-33] 22  SpO2:  [84 %-97 %] 90 %  BP: ()/(50-93) 113/59     Weight: 73.9 kg (162 lb 14.7 oz)  Body mass index is 24.77 kg/m².    Intake/Output Summary (Last 24 hours) at 4/2/2020 1110  Last data filed at 4/2/2020 0600  Gross per 24 hour   Intake 1524.13 ml   Output 1675 ml   Net -150.87 ml      Physical Exam   Constitutional: He appears well-developed. He is sedated and intubated.   Cardiovascular: Regular rhythm. Tachycardia present.   Pulmonary/Chest: Effort normal. He is intubated. No respiratory distress.   Genitourinary:   Genitourinary Comments: Urine in Roca catheter bag   Nursing note and vitals reviewed.      Significant Labs: All pertinent labs within the past 24 hours have been reviewed.   Recent Labs   Lab 03/27/20  0536 03/28/20  0330 03/29/20  0551  04/01/20  0608 04/01/20  1804 04/02/20  0459   * 128* 129*   < > 142 145  145 147*   K 4.7  5.1 5.9*   < > 4.9 4.3  4.3 4.7   CL 97 98 98   < > 105 107  107 108   CO2 18* 17* 20*   < > 28 29  29 29   BUN 91* 95* 103*   < > 83* 88*  88* 98*   CREATININE 3.2* 3.0* 3.4*   < > 2.2* 2.1*  2.1* 2.4*   CALCIUM 7.7* 8.2* 8.2*   < > 8.5* 7.9*  7.9* 8.0*   PROT 5.0* 5.6* 5.6*  --   --   --   --    BILITOT 0.7 0.8 0.8  --   --   --   --    ALKPHOS 104 106 97  --   --   --   --    ALT 33 27 24  --   --   --   --    AST 28 26 32  --   --   --   --     < > = values in this interval not displayed.       Significant Imaging: I have reviewed all pertinent imaging results/findings within the past 24 hours.   X-Ray Chest AP Portable 4/02/20: FINDINGS: Devices: Endotracheal tube tip is in good location between the thoracic inlet and pablo; patient was intubated on 03/23/2020.  NG type tube crosses the GE junction; proximal port lies near the GE junction.  Multifocal patchy subsegmental opacities are present in both lungs with peripheral and basilar predominance.  The extent of disease appears similar to recent studies dating back to 03/23/2020.  Mediastinal structures are midline; mediastinal contours are unremarkable.  I detect no new pulmonary disease and no pleural fluid, pneumothorax, pneumomediastinum, pneumoperitoneum or significant osseous abnormality.  Impression: Abnormal but stable appearance of the chest radiograph compared to prior examinations dating back to 03/23/2020.  Endotracheal tube tip is in good location.  Proximal port of the NG tube lies near the GE junction; this device might be advanced 10 cm.        Assessment/Plan:      * Pneumonia due to severe acute respiratory syndrome coronavirus 2 (SARS-CoV-2)  Acute respiratory distress syndrome (ARDS) due to COVID-19 virus  Acute respiratory failure with hypoxia  Appreciate Infectious Disease and Pulmonology. Treated with hydroxychloroquine, azithromycin, ceftriaxone. Still intubated. Chest X-ray shows persistent lung disease.    Chronic obstructive  pulmonary disease  Newly diagnosed by Pulmonology. Getting albuterol-ipratropium nebulizer treatments.    KEYUR (acute kidney injury)  Appreciate Nephrology. Getting sodium citrate-citric acid. Getting CRRT, furosemide infusion.    Acute respiratory failure with hypoxia  See primary problem.    Essential hypertension  ASCVD (arteriosclerotic cardiovascular disease)  Hyperlipidemia   PAD (peripheral artery disease)  Continue atorvastatin 40 mg daily, clopidrogel 75 mg daily. Hold home metoprolol succinate, amlodipine, lisinopril.    Type 2 diabetes mellitus with diabetic peripheral angiopathy without gangrene, without long-term current use of insulin  Holding home glimepiride, empagliflozin. Giving insulin detemir 25 units twice daily, insulin aspart 5 units every 6 hours, insulin aspart sliding scale. Decrease detemir to 20 units twice daily. Monitor Accuchecks.      VTE Risk Mitigation (From admission, onward)         Ordered     heparin (porcine) injection 2,000 Units  As needed (PRN)      03/31/20 0107     heparin, porcine (PF) 100 unit/mL injection flush 200 Units  As needed (PRN)      03/29/20 1459     heparin 25,000 units in dextrose 5% 250 mL (100 units/mL) infusion (heparin infusion - NO NOMOGRAM)  Continuous      03/29/20 1332     heparin (porcine) injection 5,000 Units  Every 12 hours      03/23/20 1357     IP VTE HIGH RISK PATIENT  Once      03/21/20 2227     Place sequential compression device  Until discontinued      03/21/20 2227                Critical care time spent on the evaluation and treatment of severe organ dysfunction, review of pertinent labs and imaging studies, discussions with consulting providers and discussions with patient/family: 30 minutes.      Ezequiel Huang MD  Department of Hospital Medicine   [facility]

## 2020-04-02 NOTE — NURSING
CBG was 68 at [place]. Notified Dr. Hughes. Increased TF to 40 cc/hr. Rechecked CBG and was 90. Pt's BP began to get low. Paused sedation. Notified Dr. Watts. Stopped Propofol. Will order ativan for sedation when on CRRT. Intiated CRRT and was not able to run very long. Notified MD. Ordered Cathflo. Let Cathflo dwell for 30 mins, the cath pulled back. Will initiate CRRT again

## 2020-04-02 NOTE — ASSESSMENT & PLAN NOTE
Holding home glimepiride, empagliflozin. Giving insulin detemir 25 units twice daily, insulin aspart 5 units every 6 hours, insulin aspart sliding scale. Decrease detemir to 20 units twice daily. Monitor Accuchecks.

## 2020-04-03 NOTE — ASSESSMENT & PLAN NOTE
Holding home glimepiride, empagliflozin. Giving insulin detemir 20 units twice daily, insulin aspart 5 units every 6 hours, insulin aspart sliding scale. Monitor Accuchecks.

## 2020-04-03 NOTE — PROGRESS NOTES
Pharmacokinetic Initial Assessment: IV Vancomycin    Assessment/Plan:    Initiate intravenous vancomycin with loading dose of 1500 mg once with subsequent doses when random concentrations are less than 20 mcg/mL  Desired empiric serum trough concentration is 15 to 20 mcg/mL  Draw vancomycin random level on 4/4 at 0400.  Pharmacy will continue to follow and monitor vancomycin.      Please contact pharmacy at extension [phone] with any questions regarding this assessment.     Thank you for the consult,   Saira Thapa       Patient brief summary:  Zhao Fischer is a 74 y.o. male initiated on antimicrobial therapy with IV Vancomycin for treatment of suspected lower respiratory infection    Drug Allergies:   Review of patient's allergies indicates:   Allergen Reactions    Clindamycin Diarrhea       Actual Body Weight:   73kg    Renal Function:   Estimated Creatinine Clearance: 39.2 mL/min (A) (based on SCr of 1.6 mg/dL (H)).,     Dialysis Method (if applicable):  CRRT    CBC (last 72 hours):  Recent Labs   Lab Result Units 03/31/20  1123 04/01/20  0608 04/02/20  0459   WBC K/uL 11.21 11.51 17.28*   Hemoglobin g/dL 12.3* 12.3* 12.4*   Hematocrit % 37.6* 38.5* 38.7*   Platelets K/uL 215 206 205   Gran% % 83.4* 84.3* 87.0*   Lymph% % 4.9* 6.4* 4.2*   Mono% % 9.1 6.1 5.0   Eosinophil% % 0.9 1.9 2.3   Basophil% % 0.3 0.3 0.5   Differential Method  Automated Automated Automated       Metabolic Panel (last 72 hours):  Recent Labs   Lab Result Units 03/31/20  1123 04/01/20  0013 04/01/20  0608 04/01/20  1804 04/02/20  0459 04/02/20  1949 04/02/20  2259   Sodium mmol/L 142 141 142 145  145 147* 145 142   Potassium mmol/L 4.4 4.5 4.9 4.3  4.3 4.7 4.6 4.5   Chloride mmol/L 103 104 105 107  107 108 108 106   CO2 mmol/L 31* 28 28 29  29 29 28 29   Glucose mg/dL 262* 275* 198* 105  105 107 78 138*   BUN, Bld mg/dL 83* 67* 83* 88*  88* 98* 69* 65*   Creatinine mg/dL 2.1* 1.8* 2.2* 2.1*  2.1* 2.4* 1.6* 1.6*   Albumin g/dL 1.4*  1.4* 1.4* 1.3*  1.3* 1.4* 1.5* 1.4*   Magnesium mg/dL 2.5 2.5  --  2.4  2.4  2.4 2.6 2.4 2.4   Phosphorus mg/dL 3.8 3.1 4.5 3.5  3.5 3.3 3.1 3.1       Drug levels (last 3 results):  No results for input(s): VANCOMYCINRA, VANCOMYCINPE, VANCOMYCINTR in the last 72 hours.    Microbiologic Results:  Microbiology Results (last 7 days)       Procedure Component Value Units Date/Time    Culture, Respiratory with Gram Stain [[id number]]     Order Status:  No result Specimen:  Respiratory from Tracheal Aspirate     Blood culture [[id number]]     Order Status:  Sent Specimen:  Blood     Blood culture [[id number]]     Order Status:  Sent Specimen:  Blood

## 2020-04-03 NOTE — ASSESSMENT & PLAN NOTE
Acute respiratory distress syndrome (ARDS) due to COVID-19 virus  Acute respiratory failure with hypoxia  Appreciate Infectious Disease and Pulmonology. Treated with hydroxychloroquine, azithromycin, ceftriaxone. Still intubated. Chest X-ray shows persistent lung disease. New fever 4/3/2020. Started on cefepime, vancomycin. Cultured sputum, blood.

## 2020-04-03 NOTE — PROGRESS NOTES
[facility] Medicine  Progress Note    Patient Name: Zhao Fischer  MRN: [id number]  Patient Class: IP- Inpatient   Admission Date: 3/21/2020  Length of Stay: 11 days  Attending Physician: Ezequiel Huang MD  Primary Care Provider: Mannie Russell MD        Subjective:     Principal Problem:Pneumonia due to severe acute respiratory syndrome coronavirus 2 (SARS-CoV-2)        HPI:  Zhao Fischer is a 74 year old white man with hypertension, hyperlipidemia, diabetes mellitus type 2, peripheral artery disease, arteriosclerotic cardiovascular disease. He lives in [place], [place]. He has been  since he was 16 years old. His primary care physician is Dr. Mannie Russell.    He contacted Dr. Russell on 3/16/2020 to request testing for COVID-19 due to nonproductive cough, shortness of breath, and fever for the past day, but he could not get tested due to limited outpatient testing. He was prescribed levofloxacin 500 mg daily.    He presented to [facility] on 3/21/2020 with persistent symptoms, including diarrhea, which is common with COVID-19 infection, and poor appetite. He reported that his wife had similar symptoms. He was hypoxic, with oxygen saturation of 88%. Chest X-ray showed interstitial lung opacities. WBC count was low at 2780. BNP, lactate, and procalcitonin were low. CRP was elevated at 154 mg/L. COVID-19 testing has been liberal in the emergency department (criteria of cough alone is enough) so he was tested. He was not given anything other than supplemental oxygen in the emergency department. He was admitted to [facility].     Overview/Hospital Course:  He was put on hydroxychloroquine and azithromycin. His hypoxia worsened overnight between 3/22/2020 and 3/23/2020. Pulmonology was consulted and he was intubated. COVID-19 result was reported positive on 3/24/2020. He developed acute kidney injury on 3/24/2020.  Palliative Care was consulted. Hydroxychloroquine was stopped early due to hypoglycemia. Nephrology was consulted on 3/26/2020. A left internal jugular trialysis catheter was placed on 3/27/2020. He finished a course of azithromycin on 3/27/2020. He started continuous renal replacement therapy and was given furosemide infusion to augment urine output. Pulmonology diagnosed him with chronic obstructive pulmonary disease and started albuterol-ipratropium nebulizer treatments. Repeat chest X-ray on 4/2/2020 showed similar lung opacities as recent X-rays. He developed new fever on 4/3/2020, so cefepime and vancomycin were started.    Interval History: On assist control, rate 24, tidal volume 420, FiO2 70%, PEEP 12. SpO2 97%. On dexmedetomidine, ketamine, furosemide drips.    Review of Systems   Unable to perform ROS: Intubated     Objective:     Vital Signs (Most Recent):  Temp: (!) 102.7 °F (39.3 °C) (04/03/20 0919)  Pulse: 79 (04/03/20 1109)  Resp: (!) 28 (04/03/20 1109)  BP: (!) 96/54 (04/03/20 0900)  SpO2: 98 % (04/03/20 1109) Vital Signs (24h Range):  Temp:  [98.1 °F (36.7 °C)-102.7 °F (39.3 °C)] 102.7 °F (39.3 °C)  Pulse:  [] 79  Resp:  [17-40] 28  SpO2:  [86 %-98 %] 98 %  BP: ()/() 96/54     Weight: 73.9 kg (162 lb 14.7 oz)  Body mass index is 24.77 kg/m².    Intake/Output Summary (Last 24 hours) at 4/3/2020 1434  Last data filed at 4/2/2020 2200  Gross per 24 hour   Intake 1299.9 ml   Output 1793 ml   Net -493.1 ml      Physical Exam   Constitutional: He appears well-developed. He is sedated and intubated.   Cardiovascular: Regular rhythm. Tachycardia present.   Pulmonary/Chest: Effort normal. He is intubated. No respiratory distress.   Nursing note and vitals reviewed.      Significant Labs: All pertinent labs within the past 24 hours have been reviewed.   Recent Labs   Lab 03/28/20  0330 03/29/20  0551  04/02/20  1949 04/02/20  2259 04/03/20  0935   * 129*   < > 145 142 147*   K 5.1  5.9*   < > 4.6 4.5 4.8   CL 98 98   < > 108 106 107   CO2 17* 20*   < > 28 29 27   BUN 95* 103*   < > 69* 65* 87*   CREATININE 3.0* 3.4*   < > 1.6* 1.6* 2.2*   CALCIUM 8.2* 8.2*   < > 8.3* 8.1* 7.9*   PROT 5.6* 5.6*  --   --   --  5.6*   BILITOT 0.8 0.8  --   --   --  1.3*   ALKPHOS 106 97  --   --   --  105   ALT 27 24  --   --   --  27   AST 26 32  --   --   --  71*    < > = values in this interval not displayed.       Significant Imaging: I have reviewed all pertinent imaging results/findings within the past 24 hours.       Assessment/Plan:      * Pneumonia due to severe acute respiratory syndrome coronavirus 2 (SARS-CoV-2)  Acute respiratory distress syndrome (ARDS) due to COVID-19 virus  Acute respiratory failure with hypoxia  Appreciate Infectious Disease and Pulmonology. Treated with hydroxychloroquine, azithromycin, ceftriaxone. Still intubated. Chest X-ray shows persistent lung disease. New fever 4/3/2020. Started on cefepime, vancomycin. Cultured sputum, blood.     Chronic obstructive pulmonary disease  Newly diagnosed by Pulmonology. Getting albuterol-ipratropium nebulizer treatments.    KEYUR (acute kidney injury)  Appreciate Nephrology. Getting sodium citrate-citric acid. Getting CRRT, furosemide infusion.    Acute respiratory failure with hypoxia  See primary problem.    Essential hypertension  ASCVD (arteriosclerotic cardiovascular disease)  Hyperlipidemia   PAD (peripheral artery disease)  Continue atorvastatin 40 mg daily, clopidrogel 75 mg daily. Hold home metoprolol succinate, amlodipine, lisinopril.    Type 2 diabetes mellitus with diabetic peripheral angiopathy without gangrene, without long-term current use of insulin  Holding home glimepiride, empagliflozin. Giving insulin detemir 20 units twice daily, insulin aspart 5 units every 6 hours, insulin aspart sliding scale. Monitor Accuchecks.      VTE Risk Mitigation (From admission, onward)         Ordered     heparin (porcine) injection 2,000  Units  As needed (PRN)      03/31/20 0107     heparin, porcine (PF) 100 unit/mL injection flush 200 Units  As needed (PRN)      03/29/20 1459     heparin 25,000 units in dextrose 5% 250 mL (100 units/mL) infusion (heparin infusion - NO NOMOGRAM)  Continuous      03/29/20 1332     heparin (porcine) injection 5,000 Units  Every 12 hours      03/23/20 1357     IP VTE HIGH RISK PATIENT  Once      03/21/20 2227     Place sequential compression device  Until discontinued      03/21/20 2227                Critical care time spent on the evaluation and treatment of severe organ dysfunction, review of pertinent labs and imaging studies, discussions with consulting providers and discussions with patient/family: 30 minutes.      Ezequiel Huang MD  Department of Hospital Medicine   [facility]

## 2020-04-03 NOTE — SUBJECTIVE & OBJECTIVE
Interval History: On assist control, rate 24, tidal volume 420, FiO2 70%, PEEP 12. SpO2 97%. On dexmedetomidine, ketamine, furosemide drips.    Review of Systems   Unable to perform ROS: Intubated     Objective:     Vital Signs (Most Recent):  Temp: (!) 102.7 °F (39.3 °C) (04/03/20 0919)  Pulse: 79 (04/03/20 1109)  Resp: (!) 28 (04/03/20 1109)  BP: (!) 96/54 (04/03/20 0900)  SpO2: 98 % (04/03/20 1109) Vital Signs (24h Range):  Temp:  [98.1 °F (36.7 °C)-102.7 °F (39.3 °C)] 102.7 °F (39.3 °C)  Pulse:  [] 79  Resp:  [17-40] 28  SpO2:  [86 %-98 %] 98 %  BP: ()/() 96/54     Weight: 73.9 kg (162 lb 14.7 oz)  Body mass index is 24.77 kg/m².    Intake/Output Summary (Last 24 hours) at 4/3/2020 1434  Last data filed at 4/2/2020 2200  Gross per 24 hour   Intake 1299.9 ml   Output 1793 ml   Net -493.1 ml      Physical Exam   Constitutional: He appears well-developed. He is sedated and intubated.   Cardiovascular: Regular rhythm. Tachycardia present.   Pulmonary/Chest: Effort normal. He is intubated. No respiratory distress.   Nursing note and vitals reviewed.      Significant Labs: All pertinent labs within the past 24 hours have been reviewed.   Recent Labs   Lab 03/28/20  0330 03/29/20  0551  04/02/20  1949 04/02/20  2259 04/03/20  0935   * 129*   < > 145 142 147*   K 5.1 5.9*   < > 4.6 4.5 4.8   CL 98 98   < > 108 106 107   CO2 17* 20*   < > 28 29 27   BUN 95* 103*   < > 69* 65* 87*   CREATININE 3.0* 3.4*   < > 1.6* 1.6* 2.2*   CALCIUM 8.2* 8.2*   < > 8.3* 8.1* 7.9*   PROT 5.6* 5.6*  --   --   --  5.6*   BILITOT 0.8 0.8  --   --   --  1.3*   ALKPHOS 106 97  --   --   --  105   ALT 27 24  --   --   --  27   AST 26 32  --   --   --  71*    < > = values in this interval not displayed.       Significant Imaging: I have reviewed all pertinent imaging results/findings within the past 24 hours.

## 2020-04-03 NOTE — PROGRESS NOTES
[facility] Progress Note    Subjective:      Zhao Fischer is a 74 y.o.  male who is being followed by the [facility] service at [facility] for KEYUR in setting of COVID positive.   Intubated, Sedated and COVID positive  Will Continue daily CRRT.        Objective:   Last 24 Hour Vital Signs:  BP  Min: 66/49  Max: 205/98  Temp  Av.9 °F (37.7 °C)  Min: 98.1 °F (36.7 °C)  Max: 102.7 °F (39.3 °C)  Pulse  Av.7  Min: 75  Max: 154  Resp  Av.8  Min: 17  Max: 40  SpO2  Av.6 %  Min: 86 %  Max: 98 %  I/O last 3 completed shifts:  In: 1962.9 [I.V.:1317.9; NG/GT:645]  Out: 2547 [Urine:1480; Other:1067]    Physical Examination:  EXAM Deffered  COVID Positive      Laboratory:  Laboratory Data Reviewed: yes    Microbiology Data Reviewed: yes    Radiology Data Reviewed: yes      Current Medications:     Infusions:   dexmedetomidine (PRECEDEX) infusion 0.8 mcg/kg/hr (20 0726)    fentanyl Stopped (20 1000)    furosemide (LASIX) 2 mg/mL continuous infusion (non-titrating) 10 mg/hr (20 0859)    heparin (porcine) in 5 % dex Stopped (20 2230)    ketamine (KETALAR) infusion (titrating) 15 mcg/kg/min (20 0859)    norepinephrine bitartrate-D5W          Scheduled:   albuterol-ipratropium  3 mL Nebulization Q4H    atorvastatin  40 mg Oral QHS    ceFEPime (MAXIPIME) IVPB  2 g Intravenous Q12H    chlorhexidine  15 mL Mouth/Throat BID    clopidogreL  75 mg Oral Daily    famotidine (PF)  20 mg Intravenous Daily    heparin (porcine)  5,000 Units Subcutaneous Q12H    insulin detemir U-100  20 Units Subcutaneous BID    midodrine  10 mg Per OG tube TID    polyethylene glycol  17 g Oral Daily    senna  8.6 mg Oral Daily    sodium citrate-citric acid 500-334 mg/5 ml  30 mL Oral TID    vancomycin (VANCOCIN) IVPB  20 mg/kg Intravenous Once        PRN:  acetaminophen, aluminum-magnesium hydroxide-simethicone, Dextrose 10% Bolus, Dextrose 10% Bolus, fentaNYL,  glucagon (human recombinant), glucose, glucose, heparin (porcine), heparin, porcine (PF), insulin aspart U-100, lorazepam, metoprolol, ondansetron, sodium chloride 0.9%, Pharmacy to dose Vancomycin consult **AND** vancomycin - pharmacy to dose      Assessment and Plan:    Zhao Fischer is a 74 y.o.male with   #) Oliguric KEYUR   - Baseline Cr around 1.1, worsened to 3.2 today  - COVID 19 positive  - Urea Na: < 20, FeNA < 0.2, BUN/Cr: >20:1  - Dehydration vs hypotension (ATN) vs post renal  - s/p 500 cc bolus   - Patient is Net +3.7 L.  cc, Lasix 60 IV x Once at 12 noon, will monitor response, may need another dose.  - Will continue to monitor  - Renally dose all medications, avoid contrast and phos based enemas.  03/27/2020  - Cr stable at 3.2, UOP: 475 ml  - Lasix 80 mg IV and reaccess  03/28/2020  - Cr improved a little 3.0  - UOP: 680 cc  - Net 3.9+  - On Lasix gtt (10 mg/hr)   03/30/2020  - Cr: 3.4 > 2.4  - UOP: 1076 > 1830 ml, Net: +2.5L  - On Lasix gtt (10 mg/hr)  - On high therapy flow CRRT regimen, Will continue daily CRRT ( 4-hours, Therapy flow rate: 5.0 L/hr and -300, Heparin gtt 500-750 units/hr if still clotting can try upto 1000 units, Monitor PTT).  03/31/2020  - Cr: 2.5 > 2.1  - UOP: 3229 ml, Net: +997.5 ml  - Changing CVVHD to CVVH to get a little better cytokine clearance  - Therapy fluid rate : 5.0 L.hr (73.9 x 60= 4.4), BFR: 250-300, UF goal; can go up to 200 ml/hr as tolerated. CVVH for 6-hours  - Give 2000 units of heparin IV stat and then heparin gtt 750 units/hr post-filter (CVVH).   04/01/2020  - Continue above regimen   04/02/2020  - Continue above  04/03/2020  - Cr Improved to 1.6  - UOP: 945  - Net: +13.7,   - Continue current regimen       Thank you for allowing us in taking care of this patient. Please call us if you have any questions regarding this patient.         Darline Dhaliwal MD  [facility]  [phone]     If after 5pm please forward any questions to  the [facility] Nephrology Fellow/Attending on call.

## 2020-04-03 NOTE — PROGRESS NOTES
[facility]/Critical Care Resident Progress Note    Primary Team: [facility]  Attending Physician: Ezequiel Huang MD    Subjective:      Intubated Day 11    Afebrile overnight, tolerated 6 hours of CRRT. Hypotensive this morning, starting levophed. Vent requirements still at FiO2 70, PEEP 12; will continue to wean as able. Febrile on rounds today, cultures and antibiotics started     Objective:     Last 24 Hour Vital Signs:  BP  Min: 66/49  Max: 205/98  Temp  Av.2 °F (37.3 °C)  Min: 98.1 °F (36.7 °C)  Max: 100 °F (37.8 °C)  Pulse  Av.8  Min: 64  Max: 154  Resp  Av  Min: 20  Max: 40  SpO2  Av.4 %  Min: 86 %  Max: 98 %  I/O last 3 completed shifts:  In: 1962.9 [I.V.:1317.9; NG/GT:645]  Out: 2547 [Urine:1480; Other:1067]    Physical Examination:  Exam limited due to strict isolation precautions  GEN: intubated, sedated this morning  HEENT: ETT appears in place, NCAT; MMM  CV: RRR, S1/S2 appreciated  Resp: CTA B/L, mechanically ventilated  Abdomen: soft, nondistended, +BS  Ext: 2+ distal pulses, no peripheral edema  Neuro: not following commands, will attempt to wean       Laboratory:  Trended Lab Data:  Recent Labs   Lab 20  1123 20  0608 20  0459   WBC 11.21 11.51 17.28*   HGB 12.3* 12.3* 12.4*   HCT 37.6* 38.5* 38.7*    206 205       Recent Labs   Lab 20  0459 20  1949 20  2259   * 145 142   K 4.7 4.6 4.5    108 106   CO2 29 28 29   BUN 98* 69* 65*   CREATININE 2.4* 1.6* 1.6*    78 138*   CALCIUM 8.0* 8.3* 8.1*   MG 2.6 2.4 2.4   PHOS 3.3 3.1 3.1       Recent Labs   Lab 20  0330 20  0551  20  0459 20  1949 20  2259   PROT 5.6* 5.6*  --   --   --   --    ALBUMIN 1.8* 1.5*   < > 1.4* 1.5* 1.4*   BILITOT 0.8 0.8  --   --   --   --    AST 26 32  --   --   --   --    ALT 27 24  --   --   --   --    ALKPHOS 106 97  --   --   --   --     < > = values in this interval not displayed.        No results for input(s): PROTIME, PTT, INR in the last 168 hours.    Cardiac: No results for input(s): TROPONINI, CKTOTAL, CKMB, BNP in the last 168 hours.    FLP:   Lab Results   Component Value Date    TRIG 94 03/29/2020     DM:   Lab Results   Component Value Date    HGBA1C 7.4 (H) 01/28/2020    MICROALBUR 1.3 01/28/2020    CREATININE 1.6 (H) 04/02/2020     Thyroid: No results found for: TSH, FREET4, F1RTQVP, M7RALIB, THYROIDAB  Anemia: No results found for: IRON, TIBC, FERRITIN, YDGRUNQF32, FOLATE  Urinalysis:   Lab Results   Component Value Date    COLORU Yellow 03/21/2020    SPECGRAV 1.010 03/21/2020    NITRITE Negative 03/21/2020    KETONESU Negative 03/21/2020    UROBILINOGEN Negative 03/21/2020       Microbiology:  Microbiology Results (last 7 days)     ** No results found for the last 168 hours. **        Current Medications:     Infusions:   dexmedetomidine (PRECEDEX) infusion 0.8 mcg/kg/hr (04/03/20 0726)    fentanyl Stopped (04/02/20 1000)    furosemide (LASIX) 2 mg/mL continuous infusion (non-titrating) 10 mg/hr (04/03/20 0645)    heparin (porcine) in 5 % dex Stopped (04/02/20 2230)    ketamine (KETALAR) infusion (titrating) 15 mcg/kg/min (04/03/20 0515)    norepinephrine bitartrate-D5W          Scheduled:   albuterol-ipratropium  3 mL Nebulization Q4H    atorvastatin  40 mg Oral QHS    carvediloL  6.25 mg Oral BID    chlorhexidine  15 mL Mouth/Throat BID    clopidogreL  75 mg Oral Daily    famotidine (PF)  20 mg Intravenous Daily    heparin (porcine)  5,000 Units Subcutaneous Q12H    insulin detemir U-100  20 Units Subcutaneous BID    midodrine  10 mg Per OG tube TID    polyethylene glycol  17 g Oral Daily    senna  8.6 mg Oral Daily    sodium citrate-citric acid 500-334 mg/5 ml  30 mL Oral TID        PRN:  acetaminophen, aluminum-magnesium hydroxide-simethicone, Dextrose 10% Bolus, Dextrose 10% Bolus, fentaNYL, glucagon (human recombinant), glucose, glucose, heparin (porcine),  heparin, porcine (PF), insulin aspart U-100, lorazepam, metoprolol, ondansetron, sodium chloride 0.9%    Assessment:     Zhao Fischer is a 74 y.o.male with COPD, CKD, T2DM, HTN, HLD, PAD who was found to be COVID19+, intubated and now with renal failure requiring CRRT; currently in the ICU     Plan:     Neuro:  #Intubated; Sedated  #Uremia  - On fentanyl, precedex this morning  Transition to ketamine today  - on lasix gtt this morning  - SAT/SBT    Cardiovascular/Hemodynamics:  #Sepsis 2/2 COVID19  #PAD  #HTN  - hypotensive this morning, weaned off sedation and improvement.  - held coreg  - will start levophed if needed  - plavix, statin    Respiratory:   #Acute Hypoxic Respiratory Failure  #COVID19  #Concerns for VAP  - Intubated 3/23/20  - Continue low tidal volume strategy, pplat <30  - Continue bronchodilators  - FiO2 70, PEEP 12  still requiring high amounts of oxygenation, but hasn't been improving the last few days  - newly febrile this morning  - Resp Culture and Blood Cultures pending  - Vancomycin and Cefepime started    GI/FEN:  Electrolytes stable  Nutrition: Tube Feeds: Received 645cc over the past 24 hours     ID:   #[id number]  - completed 3.5 days of HCQ    #Concerns for VAP  - newly febrile this morning  - Resp Culture and Blood Cultures pending  - Vancomycin and Cefepime started     Renal:   #ARF on CKD  - Bun/Cr improving  - Completed 6 hours of CRRT yesterday  - Net even    Heme/Onc:   Hgb stable     Endocrine:  #DM2  - Levemir 20 U BID today, + SSI  - Goal 140-180     Prophylaxis:   VTE: Heparin   Stress Ulcer: Pepcid     Lines/Drains:  ETT 3/23/20  OG  [facility]oca  PIV x3  Trialysis [facility] 3/27/20    Code:  Full    Dispo:  Remains critically ill, difficult time weaning his high FiO2 requirements on the Vent. Still receiving CRRT, but mentation still not clearing. Obtain cultures and start antibiotics    Real Felix MD  [facility]  [facility]

## 2020-04-04 PROBLEM — I42.9 CARDIOMYOPATHY: Status: ACTIVE | Noted: 2020-01-01

## 2020-04-04 NOTE — PROGRESS NOTES
[facility] Medicine  Progress Note    Patient Name: Zhao Fischer  MRN: [id number]  Patient Class: IP- Inpatient   Admission Date: 3/21/2020  Length of Stay: 12 days  Attending Physician: Ezequiel Huang MD  Primary Care Provider: Mannie Russell MD        Subjective:     Principal Problem:Pneumonia due to severe acute respiratory syndrome coronavirus 2 (SARS-CoV-2)        HPI:  Zhao Fischer is a 74 year old white man with hypertension, hyperlipidemia, diabetes mellitus type 2, peripheral artery disease, arteriosclerotic cardiovascular disease. He lives in [place], [place]. He has been  since he was 16 years old. 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 His primary care physician is Dr. Mannie Russell.    He contacted Dr. Russell on 3/16/2020 to request testing for COVID-19 due to nonproductive cough, shortness of breath, and fever for the past day, but he could not get tested due to limited outpatient testing. He was prescribed levofloxacin 500 mg daily.    He presented to [facility] Emergency Department on 3/21/2020 with persistent symptoms, including diarrhea, which is common with COVID-19 infection, and poor appetite. He reported that his wife had similar symptoms. He was hypoxic, with oxygen saturation of 88%. Chest X-ray showed interstitial lung opacities. WBC count was low at 2780. BNP, lactate, and procalcitonin were low. CRP was elevated at 154 mg/L. COVID-19 testing has been liberal in the emergency department (criteria of cough alone is enough) so he was tested. He was not given anything other than supplemental oxygen in the emergency department. He was admitted to [facility].     Overview/Hospital Course:  He was put on hydroxychloroquine and azithromycin. His hypoxia worsened overnight between 3/22/2020 and 3/23/2020. Pulmonology was consulted and he was intubated. COVID-19 result was reported positive on 3/24/2020. He developed acute  kidney injury on 3/24/2020. Palliative Care was consulted. Hydroxychloroquine was stopped early due to hypoglycemia. Nephrology was consulted on 3/26/2020. A left internal jugular trialysis catheter was placed on 3/27/2020. He finished a course of azithromycin on 3/27/2020. He started continuous renal replacement therapy and was given furosemide infusion to augment urine output. Pulmonology diagnosed him with chronic obstructive pulmonary disease and started albuterol-ipratropium nebulizer treatments. Repeat chest X-ray on 4/2/2020 showed similar lung opacities as recent X-rays. He developed new fever on 4/3/2020, so cefepime and vancomycin were started. He developed atrial fibrillation and ventricular tachycardia. Pulmonology discussed his lack of improvement and poor prognosis with his wife.    Interval History: On assist control, rate 24, tidal volume 420, FiO2 80%, PEEP 12. Was off sedation yesterday and did not wake up. Put on fentanyl this morning. Had a code blue due to ventricular tachycardia. Pulmonology fellow talked to his wife. She wants to come see him in person. Bedside echo by the other Pulmonology fellow shows abnormal septal wall motion.    Review of Systems   Unable to perform ROS: Intubated     Objective:     Vital Signs (Most Recent):  Temp: 97.7 °F (36.5 °C) (04/04/20 0830)  Pulse: 91 (04/04/20 0830)  Resp: (!) 26 (04/04/20 0830)  BP: (!) 169/103 (04/04/20 0830)  SpO2: (!) 94 % (04/04/20 0830) Vital Signs (24h Range):  Temp:  [97.3 °F (36.3 °C)-101 °F (38.3 °C)] 97.7 °F (36.5 °C)  Pulse:  [] 91  Resp:  [26-40] 26  SpO2:  [86 %-100 %] 94 %  BP: ()/() 169/103     Weight: 73.9 kg (162 lb 14.7 oz)  Body mass index is 24.77 kg/m².    Intake/Output Summary (Last 24 hours) at 4/4/2020 1126  Last data filed at 4/4/2020 0916  Gross per 24 hour   Intake 1020.73 ml   Output 1673 ml   Net -652.27 ml      Physical Exam   Constitutional: He appears well-developed. He is sedated and  intubated.   Cardiovascular: Regular rhythm. Tachycardia present.   Pulmonary/Chest: Tachypnea noted. He is intubated.   Nursing note and vitals reviewed.      Significant Labs: All pertinent labs within the past 24 hours have been reviewed.   Recent Labs   Lab 03/29/20  0551  04/03/20  0935 04/03/20  1525 04/04/20  0734 04/04/20  1025   *   < > 147* 146* 141  144 140   K 5.9*   < > 4.8 4.9 5.3*  4.6 4.5   CL 98   < > 107 107 106  105 105   CO2 20*   < > 27 28 24  28 27   *   < > 87* 97* 56*  58* 47*   CREATININE 3.4*   < > 2.2* 2.4* 1.7*  1.7* 1.4   CALCIUM 8.2*   < > 7.9* 7.8* 8.4*  8.6* 8.3*   PROT 5.6*  --  5.6*  --  6.5  --    BILITOT 0.8  --  1.3*  --  0.9  --    ALKPHOS 97  --  105  --  115  --    ALT 24  --  27  --  27  --    AST 32  --  71*  --  52*  --     < > = values in this interval not displayed.       Significant Imaging: I have reviewed all pertinent imaging results/findings within the past 24 hours.       Assessment/Plan:      * Pneumonia due to severe acute respiratory syndrome coronavirus 2 (SARS-CoV-2)  Acute respiratory distress syndrome (ARDS) due to COVID-19 virus  Acute respiratory failure with hypoxia  Appreciate Infectious Disease and Pulmonology. Treated with hydroxychloroquine, azithromycin, ceftriaxone. Still intubated. Chest X-ray shows persistent lung disease. New fever 4/3/2020. Started on cefepime, vancomycin. Cultured sputum, blood. Has not shown much improvement since intubation so poor prognosis has been related to his wife.    Cardiomyopathy  Unclear chronicity, cannot be formally evaluated due to COVID-19 isolation. Could be preexisting, due to medications used to treat him acutely, or due to sepsis and hypoxia. At risk for fatal arrhythmia at this time, which has been related to his wife.    Chronic obstructive pulmonary disease  Newly diagnosed by Pulmonology. Getting albuterol-ipratropium nebulizer treatments.    KEYUR (acute kidney injury)  Appreciate  Nephrology. Getting sodium citrate-citric acid. Getting CRRT, furosemide infusion.    Acute respiratory failure with hypoxia  See primary problem.    Essential hypertension  ASCVD (arteriosclerotic cardiovascular disease)  Hyperlipidemia   PAD (peripheral artery disease)  Continue atorvastatin 40 mg daily, clopidrogel 75 mg daily. Hold home metoprolol succinate, amlodipine, lisinopril.    Type 2 diabetes mellitus with diabetic peripheral angiopathy without gangrene, without long-term current use of insulin  Holding home glimepiride, empagliflozin. Giving insulin detemir 25 units twice daily, insulin aspart sliding scale. Scheduled insulin aspart stopped to avoid multiple encounters risking spread of infection. Monitor Accuchecks.      VTE Risk Mitigation (From admission, onward)         Ordered     heparin (porcine) injection 2,000 Units  As needed (PRN)      03/31/20 0107     heparin, porcine (PF) 100 unit/mL injection flush 200 Units  As needed (PRN)      03/29/20 1459     heparin 25,000 units in dextrose 5% 250 mL (100 units/mL) infusion (heparin infusion - NO NOMOGRAM)  Continuous      03/29/20 1332     heparin (porcine) injection 5,000 Units  Every 12 hours      03/23/20 1357     IP VTE HIGH RISK PATIENT  Once      03/21/20 2227     Place sequential compression device  Until discontinued      03/21/20 2227                Critical care time spent on the evaluation and treatment of severe organ dysfunction, review of pertinent labs and imaging studies, discussions with consulting providers and discussions with patient/family: 40 minutes.      Ezequiel Huang MD  Department of Hospital Medicine   [facility]

## 2020-04-04 NOTE — SIGNIFICANT EVENT
Spoke with Ms. Fischer this afternoon as well as her two sons via conference call. Decision has been made to make DNR. At this point, she needs a little more time before withdrawing care.     Dante Durham MD  Pulm/CC Fellow PGY6

## 2020-04-04 NOTE — NURSING
Notified team of pt off of CRRT.  Per team do not need to restart lasix drip and no 2p lab orders.  Hold amio and only run if pt back in afib (currently ST).  Will continue to monitor.

## 2020-04-04 NOTE — ASSESSMENT & PLAN NOTE
Acute respiratory distress syndrome (ARDS) due to COVID-19 virus  Acute respiratory failure with hypoxia  Appreciate Infectious Disease and Pulmonology. Treated with hydroxychloroquine, azithromycin, ceftriaxone. Still intubated. Chest X-ray shows persistent lung disease. New fever 4/3/2020. Started on cefepime, vancomycin. Cultured sputum, blood. Has not shown much improvement since intubation so poor prognosis has been related to his wife.

## 2020-04-04 NOTE — PLAN OF CARE
[facility] Plan of Care    Pt: Zhao Fischer    Patient with Code event this AM. Per RN report, during CRRT patient went into v-tach w/ pulses, then afib with RVR. Blood return on CRRT started. Patient then developed pulseless v-tach and Code blue called. ROSC achieved. Patient with continued supportive care per ICU until family arrives.    At this point, patient is unlikely to receive any meaningful benefit from further RRT and he is dying from multiorgan failure in setting of COVID+.     Nephrology to sign off. Please call with any questions.    Franklin Tucker MD  [facility]  [phone]

## 2020-04-04 NOTE — SUBJECTIVE & OBJECTIVE
Interval History: On assist control, rate 24, tidal volume 420, FiO2 80%, PEEP 12. Was off sedation yesterday and did not wake up. Put on fentanyl this morning. Had a code blue due to ventricular tachycardia. Pulmonology fellow talked to his wife. She wants to come see him in person. Bedside echo by the other Pulmonology fellow shows abnormal septal wall motion.    Review of Systems   Unable to perform ROS: Intubated     Objective:     Vital Signs (Most Recent):  Temp: 97.7 °F (36.5 °C) (04/04/20 0830)  Pulse: 91 (04/04/20 0830)  Resp: (!) 26 (04/04/20 0830)  BP: (!) 169/103 (04/04/20 0830)  SpO2: (!) 94 % (04/04/20 0830) Vital Signs (24h Range):  Temp:  [97.3 °F (36.3 °C)-101 °F (38.3 °C)] 97.7 °F (36.5 °C)  Pulse:  [] 91  Resp:  [26-40] 26  SpO2:  [86 %-100 %] 94 %  BP: ()/() 169/103     Weight: 73.9 kg (162 lb 14.7 oz)  Body mass index is 24.77 kg/m².    Intake/Output Summary (Last 24 hours) at 4/4/2020 1126  Last data filed at 4/4/2020 0916  Gross per 24 hour   Intake 1020.73 ml   Output 1673 ml   Net -652.27 ml      Physical Exam   Constitutional: He appears well-developed. He is sedated and intubated.   Cardiovascular: Regular rhythm. Tachycardia present.   Pulmonary/Chest: Tachypnea noted. He is intubated.   Nursing note and vitals reviewed.      Significant Labs: All pertinent labs within the past 24 hours have been reviewed.   Recent Labs   Lab 03/29/20  0551  04/03/20  0935 04/03/20  1525 04/04/20  0734 04/04/20  1025   *   < > 147* 146* 141  144 140   K 5.9*   < > 4.8 4.9 5.3*  4.6 4.5   CL 98   < > 107 107 106  105 105   CO2 20*   < > 27 28 24  28 27   *   < > 87* 97* 56*  58* 47*   CREATININE 3.4*   < > 2.2* 2.4* 1.7*  1.7* 1.4   CALCIUM 8.2*   < > 7.9* 7.8* 8.4*  8.6* 8.3*   PROT 5.6*  --  5.6*  --  6.5  --    BILITOT 0.8  --  1.3*  --  0.9  --    ALKPHOS 97  --  105  --  115  --    ALT 24  --  27  --  27  --    AST 32  --  71*  --  52*  --     < > = values in this  interval not displayed.       Significant Imaging: I have reviewed all pertinent imaging results/findings within the past 24 hours.

## 2020-04-04 NOTE — ASSESSMENT & PLAN NOTE
Holding home glimepiride, empagliflozin. Giving insulin detemir 25 units twice daily, insulin aspart sliding scale. Scheduled insulin aspart stopped to avoid multiple encounters risking spread of infection. Monitor Accuchecks.

## 2020-04-04 NOTE — ASSESSMENT & PLAN NOTE
Unclear chronicity, cannot be formally evaluated due to COVID-19 isolation. Could be preexisting, due to medications used to treat him acutely, or due to sepsis and hypoxia. At risk for fatal arrhythmia at this time, which has been related to his wife.

## 2020-04-04 NOTE — PLAN OF CARE
[facility]/Critical Care Plan of Care Note    Was notified that patient went into vtach with pulses, then afib with RVR, then pulseless vtach; coded, chest compressions were started with ROSC. CRRT was running during this event and stopped. AED applied, remains intubated, dysrhythmias noted.    Team updated wife on the events this morning and our concerns regarding his worsening condition overall and despite our best efforts he is not clinically improving.    At this time she would like to come visit him, we stated we will have our nursing staff contact her regarding the steps to have her come visit and for the time being we will continue to treat him until she can come today.    Plan:  Amio bolus + gtt started  Troponin, mg, Phos, bmp, poct glucose ordered    Real Felix MD  [facility] HO-II  [facility]  1030, 4/4/2020

## 2020-04-04 NOTE — PROGRESS NOTES
Pharmacokinetic Assessment Follow Up: IV Vancomycin    Vancomycin serum concentration assessment(s):    The random level was drawn correctly and can be used to guide therapy at this time. The measurement is below the desired definitive target range of 15 to 20 mcg/mL.    Vancomycin Regimen Plan:  Continue to pulse dose with Vancomycin 1500 mg IV one time now and next serum random concentration measured at 0400 on 4-5-20       Drug levels (last 3 results):  Recent Labs   Lab Result Units 04/03/20  0935 04/04/20  0734   Vancomycin, Random ug/mL <1.1 9.8       Pharmacy will continue to follow and monitor vancomycin.    Please contact pharmacy at extension [phone] for questions regarding this assessment.    Thank you for the consult,   Ori Lutz       Patient brief summary:  Zhao Fischer is a 74 y.o. male initiated on antimicrobial therapy with IV Vancomycin for treatment of lower respiratory infection/Pneumonia    The patient's current regimen is Vancomycin pulse dosing    Drug Allergies:   Review of patient's allergies indicates:   Allergen Reactions    Clindamycin Diarrhea       Actual Body Weight:   73.9 kg    Renal Function:   Estimated Creatinine Clearance: 36.9 mL/min (A) (based on SCr of 1.7 mg/dL (H)).,     Dialysis Method (if applicable):  CRRT    CBC (last 72 hours):  Recent Labs   Lab Result Units 04/02/20  0459 04/03/20  0935 04/04/20  0734   WBC K/uL 17.28* 26.60* 28.36*  28.36*   Hemoglobin g/dL 12.4* 12.5* 14.4  14.4   Hematocrit % 38.7* 39.6* 44.0  44.0   Platelets K/uL 205 199 294  294   Gran% % 87.0* 91.9* 92.2*  92.2*   Lymph% % 4.2* 3.0* 2.5*  2.5*   Mono% % 5.0 2.8* 3.1*  3.1*   Eosinophil% % 2.3 0.5 0.8  0.8   Basophil% % 0.5 0.3 0.4  0.4   Differential Method  Automated Automated Automated  Automated       Metabolic Panel (last 72 hours):  Recent Labs   Lab Result Units 04/01/20  1804 04/02/20  0459 04/02/20  1949 04/02/20  2259 04/03/20  0935 04/03/20  1116 04/03/20  1525  04/04/20  0734   Sodium mmol/L 145  145 147* 145 142 147*  --  146* 141  144   Potassium mmol/L 4.3  4.3 4.7 4.6 4.5 4.8  --  4.9 5.3*  4.6   Chloride mmol/L 107  107 108 108 106 107  --  107 106  105   CO2 mmol/L 29  29 29 28 29 27  --  28 24  28   Glucose mg/dL 105  105 107 78 138* 180*  --  256* 187*  189*   BUN, Bld mg/dL 88*  88* 98* 69* 65* 87*  --  97* 56*  58*   Creatinine mg/dL 2.1*  2.1* 2.4* 1.6* 1.6* 2.2*  --  2.4* 1.7*  1.7*   Creatinine, Random Ur mg/dL  --   --   --   --   --  178.6  --   --    Albumin g/dL 1.3*  1.3* 1.4* 1.5* 1.4* 1.5*  --  1.4* 1.6*  1.6*   Total Bilirubin mg/dL  --   --   --   --  1.3*  --   --  0.9   Alkaline Phosphatase U/L  --   --   --   --  105  --   --  115   AST U/L  --   --   --   --  71*  --   --  52*   ALT U/L  --   --   --   --  27  --   --  27   Magnesium mg/dL 2.4  2.4  2.4 2.6 2.4 2.4 2.4  --  2.5 2.6   Phosphorus mg/dL 3.5  3.5 3.3 3.1 3.1 3.3  --  4.1 3.4       Vancomycin Administrations:  vancomycin given in the last 96 hours                     vancomycin 1.5 g in dextrose 5 % 250 mL IVPB (ready to mix) (mg) 1,500 mg New Bag 04/03/20 1159                      Microbiologic Results:  Microbiology Results (last 7 days)       Procedure Component Value Units Date/Time    Culture, Respiratory with Gram Stain [[id number]] Collected:  04/03/20 1116    Order Status:  Completed Specimen:  Respiratory from Tracheal Aspirate Updated:  04/04/20 0822     Respiratory Culture No Growth     Gram Stain (Respiratory) <10 epithelial cells per low power field.     Gram Stain (Respiratory) Few WBC's     Gram Stain (Respiratory) No organisms seen    Blood culture [[id number]] Collected:  04/03/20 1208    Order Status:  Completed Specimen:  Blood from Antecubital, Left Updated:  04/04/20 0115     Blood Culture, Routine No Growth to date    Blood culture [[id number]] Collected:  04/03/20 1208    Order Status:  Completed Specimen:  Blood Updated:  04/04/20 0115     Blood  Culture, Routine No Growth to date

## 2020-04-04 NOTE — PROGRESS NOTES
[facility]/Critical Care Resident Progress Note    Primary Team: [facility]  Attending Physician: Ezequiel Huang MD    Subjective:      Intubated Day 12    Febrile to 102.7F and has since come down, vent requirements continuing to wean, but then needing to go up again. Continuing antibiotics, cultures pending     Objective:     Last 24 Hour Vital Signs:  BP  Min: 65/39  Max: 183/84  Temp  Av.2 °F (37.3 °C)  Min: 97.3 °F (36.3 °C)  Max: 102.7 °F (39.3 °C)  Pulse  Av.8  Min: 65  Max: 187  Resp  Av  Min: 24  Max: 40  SpO2  Av.5 %  Min: 86 %  Max: 100 %  I/O last 3 completed shifts:  In: 1581.2 [I.V.:931.2; NG/GT:600; IV Piggyback:50]  Out:  [Urine:1335; Other:593]    Physical Examination:  Exam limited due to strict isolation precautions  GEN: intubated, sedated  HEENT: ETT appears in place, NCAT; MMM  CV: RRR, S1/S2 appreciated  Resp: CTA B/L, mechanically ventilated  Abdomen: soft, nondistended, +BS  Ext: 2+ distal pulses, no peripheral edema  Neuro: not following commands or having purposeful movements    Laboratory:  Trended Lab Data:  Recent Labs   Lab 20  0608 20  0459 20  0935   WBC 11.51 17.28* 26.60*   HGB 12.3* 12.4* 12.5*   HCT 38.5* 38.7* 39.6*    205 199       Recent Labs   Lab 20  0935 20  1525    147* 146*   K 4.5 4.8 4.9    107 107   CO2 29 27 28   BUN 65* 87* 97*   CREATININE 1.6* 2.2* 2.4*   * 180* 256*   CALCIUM 8.1* 7.9* 7.8*   MG 2.4 2.4 2.5   PHOS 3.1 3.3 4.1       Recent Labs   Lab 20  0551  20  22520  0935 20  1525   PROT 5.6*  --   --  5.6*  --    ALBUMIN 1.5*   < > 1.4* 1.5* 1.4*   BILITOT 0.8  --   --  1.3*  --    AST 32  --   --  71*  --    ALT 24  --   --  27  --    ALKPHOS 97  --   --  105  --     < > = values in this interval not displayed.       No results for input(s): PROTIME, PTT, INR in the last 168 hours.    Cardiac: No results for  input(s): TROPONINI, CKTOTAL, CKMB, BNP in the last 168 hours.    FLP:   Lab Results   Component Value Date    TRIG 94 03/29/2020     DM:   Lab Results   Component Value Date    HGBA1C 7.4 (H) 01/28/2020    MICROALBUR 1.3 01/28/2020    CREATININE 2.4 (H) 04/03/2020     Thyroid: No results found for: TSH, FREET4, E6ASXCN, V8KWBGJ, THYROIDAB  Anemia: No results found for: IRON, TIBC, FERRITIN, ASITRCCE16, FOLATE  Urinalysis:   Lab Results   Component Value Date    COLORU Yellow 03/21/2020    SPECGRAV 1.010 03/21/2020    NITRITE Negative 03/21/2020    KETONESU Negative 03/21/2020    UROBILINOGEN Negative 03/21/2020       Microbiology:  Microbiology Results (last 7 days)     Procedure Component Value Units Date/Time    Blood culture [[id number]] Collected:  04/03/20 1208    Order Status:  Completed Specimen:  Blood from Antecubital, Left Updated:  04/04/20 0115     Blood Culture, Routine No Growth to date    Blood culture [[id number]] Collected:  04/03/20 1208    Order Status:  Completed Specimen:  Blood Updated:  04/04/20 0115     Blood Culture, Routine No Growth to date    Culture, Respiratory with Gram Stain [[id number]] Collected:  04/03/20 1116    Order Status:  Completed Specimen:  Respiratory from Tracheal Aspirate Updated:  04/03/20 1932     Gram Stain (Respiratory) <10 epithelial cells per low power field.     Gram Stain (Respiratory) Few WBC's     Gram Stain (Respiratory) No organisms seen        Current Medications:     Infusions:   dexmedetomidine (PRECEDEX) infusion Stopped (04/03/20 1335)    fentanyl Stopped (04/02/20 1000)    furosemide (LASIX) 2 mg/mL continuous infusion (non-titrating) Stopped (04/03/20 2200)    heparin (porcine) in 5 % dex 750 Units/hr (04/03/20 2230)    ketamine (KETALAR) infusion (titrating) Stopped (04/03/20 1426)    norepinephrine bitartrate-D5W Stopped (04/03/20 0830)        Scheduled:   albuterol-ipratropium  3 mL Nebulization Q4H    atorvastatin  40 mg Oral QHS    ceFEPime  (MAXIPIME) IVPB  2 g Intravenous Q12H    chlorhexidine  15 mL Mouth/Throat BID    clopidogreL  75 mg Oral Daily    famotidine (PF)  20 mg Intravenous Daily    heparin (porcine)  5,000 Units Subcutaneous Q12H    insulin detemir U-100  20 Units Subcutaneous BID    midodrine  10 mg Per OG tube TID    polyethylene glycol  17 g Oral Daily    senna  8.6 mg Oral Daily    sodium citrate-citric acid 500-334 mg/5 ml  30 mL Oral TID        PRN:  acetaminophen, aluminum-magnesium hydroxide-simethicone, Dextrose 10% Bolus, Dextrose 10% Bolus, fentaNYL, glucagon (human recombinant), glucose, glucose, heparin (porcine), heparin, porcine (PF), insulin aspart U-100, metoprolol, ondansetron, sodium chloride 0.9%, Pharmacy to dose Vancomycin consult **AND** vancomycin - pharmacy to dose    Assessment:     Zhao Fischer is a 74 y.o.male with COPD, CKD, T2DM, HTN, HLD, PAD who was found to be COVID19+, intubated and now with renal failure requiring CRRT; currently in the ICU     Plan:     Neuro:  #Intubated; Sedated  #Uremia  - off sedation this morning, but had to be restarted due to agitation. No purposeful movements or following commands when assessed  - SAT/SBT     Cardiovascular/Hemodynamics:  #Sepsis 2/2 COVID19  #PAD  #HTN  - was concerning for hypotension yesterday, levo ordered but not given  - Holding coreg; can resume if needed  - plavix, statin     Respiratory:   #Acute Hypoxic Respiratory Failure  #COVID19  #Concerns for VAP  - Intubated 3/23/20  - Continue low tidal volume strategy, pplat <30  - Continue bronchodilators  - FiO2 70, PEEP 12  Will continue to wean as able  - Resp Culture and Blood Cultures pending  - Continue Vancomycin, Cefepime     GI/FEN:  Fluid: Net positive +250cc  Electrolytes stable  Nutrition: Tube Feeds: Received 600cc over the past 24 hours     ID:   #[id number]  - completed 3.5 days of HCQ     #Concerns for VAP  - Resp Culture and Blood Cultures pending  - Continue Vancomycin,  Cefepime     Renal:   #ARF on CKD  - lasix gtt when not on CRRT; nephrology following  - Net even, keep net negative  - renally dose all medication, avoid nephrotoxic agents     Heme/Onc:   Hgb stable     Endocrine:  #DM2  - Levemir 25 U BID today, + SSI  - Goal 140-180     Prophylaxis:   VTE: Heparin   Stress Ulcer: Famotidine     Lines/Drains:  ETT 3/23/20  OG  Roca  PIV x2  Trialysis [facility] 3/27/20     Code:  Full     Dispo:  Remains critically ill, difficult time weaning his high FiO2 requirements on the Vent. Continuing antibiotics. Will discuss with his wife again today if she would like to come visit him as he continues to not progress in the right direction    Real Felix MD  [facility] HO-II  [facility]

## 2020-04-04 NOTE — PLAN OF CARE
Patient on ventilator with documented settings. Alarms are set and functioning with adequate volumes. AMBU bag and mask at bedside. Will continue to monitor.

## 2020-04-04 NOTE — EICU
Rounding (Video Assessment):  N/A    Intervention Initiated From:  Bedside    Hood Communicated with Bedside Nurse regarding:  Other, CODE    Nurse Notified:  Yes    Doctor Notified:  Yes    Comments:  Called to pts room for documentation of CODE. Camera'd into room to see RN doing CPR. Stopped after 20 secs due to pt regained pulse. RN at bedside states the pt went into A-Fib RVR then became pulseless. CRRT running at this time. Pt was rinsed back. AED pads applied. Pt already intubated and on vent at the time of this episode. Sinus dysrhythmia noted on monitor. Pt is moving in bed. Dr. Maxwell at bedside.

## 2020-04-05 PROBLEM — Z51.5 COMFORT MEASURES ONLY STATUS: Status: ACTIVE | Noted: 2020-01-01

## 2020-04-05 NOTE — ASSESSMENT & PLAN NOTE
Acute respiratory distress syndrome (ARDS) due to COVID-19 virus  Acute respiratory failure with hypoxia  Comfort measures only status  Appreciate Infectious Disease and Pulmonology. Treated with hydroxychloroquine, azithromycin, ceftriaxone. Still intubated. Chest X-ray shows persistent lung disease. Has not progressed in the right direction and is now on comfort measures.

## 2020-04-05 NOTE — ASSESSMENT & PLAN NOTE
Unclear chronicity, cannot be formally evaluated due to COVID-19 isolation. Could be preexisting, due to medications used to treat him acutely, or due to sepsis and hypoxia. At risk for fatal arrhythmia at this time, which was related to his wife.

## 2020-04-05 NOTE — NURSING
Morphine drip started at 1139 to make pt comfortable as care is being withdrawn. Fentanyl drip stopped.

## 2020-04-05 NOTE — SUBJECTIVE & OBJECTIVE
Interval History: On assist control, rate 24, tidal volume 420, FiO2 80%, PEEP 12. Oxygen saturation 100%. On morphine drip.    Review of Systems   Unable to perform ROS: Intubated     Objective:     Vital Signs (Most Recent):  Temp: 98.8 °F (37.1 °C) (04/04/20 2315)  Pulse: (!) 160 (04/05/20 1400)  Resp: (!) 29 (04/05/20 1400)  BP: (!) 126/90 (04/05/20 1400)  SpO2: 99 % (04/05/20 1400) Vital Signs (24h Range):  Temp:  [98.8 °F (37.1 °C)-98.9 °F (37.2 °C)] 98.8 °F (37.1 °C)  Pulse:  [108-170] 160  Resp:  [21-56] 29  SpO2:  [85 %-100 %] 99 %  BP: (108-165)/(69-97) 126/90     Weight: 73.9 kg (162 lb 14.7 oz)  Body mass index is 24.77 kg/m².    Intake/Output Summary (Last 24 hours) at 4/5/2020 1601  Last data filed at 4/4/2020 2315  Gross per 24 hour   Intake 37.46 ml   Output 100 ml   Net -62.54 ml      Physical Exam   Constitutional: He appears well-developed. He is intubated.   Cardiovascular: Regular rhythm. Tachycardia present.   Pulmonary/Chest: He is intubated.   Irregular breathing pattern   Genitourinary:   Genitourinary Comments: Urine in Roca bag   Nursing note and vitals reviewed.      Significant Labs: All pertinent labs within the past 24 hours have been reviewed.   Recent Labs   Lab 04/03/20  0935 04/03/20  1525 04/04/20  0734 04/04/20  1025   * 146* 141  144 140   K 4.8 4.9 5.3*  4.6 4.5    107 106  105 105   CO2 27 28 24  28 27   BUN 87* 97* 56*  58* 47*   CREATININE 2.2* 2.4* 1.7*  1.7* 1.4   CALCIUM 7.9* 7.8* 8.4*  8.6* 8.3*   PROT 5.6*  --  6.5  --    BILITOT 1.3*  --  0.9  --    ALKPHOS 105  --  115  --    ALT 27  --  27  --    AST 71*  --  52*  --        Significant Imaging: I have reviewed all pertinent imaging results/findings within the past 24 hours.

## 2020-04-05 NOTE — PROGRESS NOTES
[facility] Pulmonary/Critical Care Resident Progress Note    Primary Team: [facility]  Attending Physician: Ezequiel Huang MD    Subjective:      No issues overnight. Code status changed to DNR after discussions with family. Plan to pursue palliative route.      Objective:     Last 24 Hour Vital Signs:  BP  Min: 112/75  Max: 165/96  Temp  Av.9 °F (37.2 °C)  Min: 98.8 °F (37.1 °C)  Max: 98.9 °F (37.2 °C)  Pulse  Av.8  Min: 108  Max: 170  Resp  Av.4  Min: 21  Max: 54  SpO2  Av %  Min: 85 %  Max: 100 %  I/O last 3 completed shifts:  In: 1411.3 [I.V.:761.3; NG/GT:300; IV Piggyback:350]  Out: 1726 [Urine:1120; Other:606]    Physical Examination:  Exam limited due to strict isolation precautions  GEN: intubated  HEENT: ETT appears in place, NCAT  CV: RRR, S1/S2 appreciated  Resp: Coarse breath sounds, mechanically ventilated  Abdomen: soft, nondistended, +BS  Ext: 2+ distal pulses, no peripheral edema  Neuro: unresponsive; not following commands    Laboratory:  Trended Lab Data:  Recent Labs   Lab 20  0459 20  0935 20  0734   WBC 17.28* 26.60* 28.36*  28.36*   HGB 12.4* 12.5* 14.4  14.4   HCT 38.7* 39.6* 44.0  44.0    199 294  294       Recent Labs   Lab 20  1525 20  0734 20  1025   * 141  144 140   K 4.9 5.3*  4.6 4.5    106  105 105   CO2 28 24  28 27   BUN 97* 56*  58* 47*   CREATININE 2.4* 1.7*  1.7* 1.4   * 187*  189* 210*   CALCIUM 7.8* 8.4*  8.6* 8.3*   MG 2.5 2.6 2.5   PHOS 4.1 3.4 3.3       Recent Labs   Lab 20  0935 20  1525 20  0734   PROT 5.6*  --  6.5   ALBUMIN 1.5* 1.4* 1.6*  1.6*   BILITOT 1.3*  --  0.9   AST 71*  --  52*   ALT 27  --  27   ALKPHOS 105  --  115       No results for input(s): PROTIME, PTT, INR in the last 168 hours.    Cardiac:   Recent Labs   Lab 20  1025   TROPONINI 0.082*       FLP:   Lab Results   Component Value Date    TRIG 94 2020     DM:    Lab Results   Component Value Date    HGBA1C 7.4 (H) 01/28/2020    MICROALBUR 1.3 01/28/2020    CREATININE 1.4 04/04/2020     Thyroid: No results found for: TSH, FREET4, M6EKBMA, F9WNRSE, THYROIDAB  Anemia: No results found for: IRON, TIBC, FERRITIN, AFYPXJTZ97, FOLATE  Urinalysis:   Lab Results   Component Value Date    COLORU Yellow 03/21/2020    SPECGRAV 1.010 03/21/2020    NITRITE Negative 03/21/2020    KETONESU Negative 03/21/2020    UROBILINOGEN Negative 03/21/2020     Microbiology:  Microbiology Results (last 7 days)     Procedure Component Value Units Date/Time    Blood culture [[id number]] Collected:  04/03/20 1208    Order Status:  Completed Specimen:  Blood from Antecubital, Left Updated:  04/05/20 1812     Blood Culture, Routine No Growth to date      No Growth to date      No Growth to date    Blood culture [[id number]] Collected:  04/03/20 1208    Order Status:  Completed Specimen:  Blood Updated:  04/05/20 1812     Blood Culture, Routine No Growth to date      No Growth to date      No Growth to date    Culture, Respiratory with Gram Stain [[id number]] Collected:  04/03/20 1116    Order Status:  Completed Specimen:  Respiratory from Tracheal Aspirate Updated:  04/04/20 0822     Respiratory Culture No Growth     Gram Stain (Respiratory) <10 epithelial cells per low power field.     Gram Stain (Respiratory) Few WBC's     Gram Stain (Respiratory) No organisms seen        Current Medications:     Infusions:   fentanyl Stopped (04/05/20 1138)    morphine 13 mg/hr (04/05/20 1600)        Scheduled:   glycopyrrolate  0.2 mg Intravenous Q4H        PRN:  acetaminophen, aluminum-magnesium hydroxide-simethicone, fentaNYL, lorazepam, morphine, morphine, ondansetron, sodium chloride 0.9%      Assessment:     Zhao Fischer is a 74 y.o.male with COPD, CKD, T2DM, HTN, HLD, PAD who was found to be COVID19+, intubated and currently in the ICU.     Plan:     After long discussions with family, and Mr. Fischer's  failure to improve, will provide comfort measures and terminal extubation. Comfort care order set initiated. Morphine for dyspnea and analgesia. Ativan for anxiety. Code status DNR    Dante Durham MD  [facility]/Critical Care Service

## 2020-04-05 NOTE — CARE UPDATE
Pt is currently on comfort care. Pt is on a morphine drip at 13 mg/h.   Pt is still ventilated but the vent settings have been decreased by respiratory therapy.  Pt appears to be comfortable at the time.   Will continue to monitor

## 2020-04-05 NOTE — NURSING
Spoke with wife concerning pt belongings.  Specifically family is looking for pt phone, , wallet and keys.  Charge notified and notified security.  Will continue to monitor.

## 2020-04-05 NOTE — CARE UPDATE
Report received from Fred BLAS this am about pt status. Pt is currently DNR status and we are waiting for wife before pt is extubated and care is withdrawn.     Assessment  Pt is intubated  Pt is restless in the bed and maxed out on fentanyl drip.   No meds are being given to the patient, pt is being kept comfortable until the wife is ready to approach the next step in the pt care   Ry Pereira RN

## 2020-04-05 NOTE — PROGRESS NOTES
[facility] Hospital Medicine  Progress Note    Patient Name: Zhao Fischer  MRN: [id number]  Patient Class: IP- Inpatient   Admission Date: 3/21/2020  Length of Stay: 13 days  Attending Physician: Ezequiel Huang MD  Primary Care Provider: Mannie Russell MD        Subjective:     Principal Problem:Pneumonia due to severe acute respiratory syndrome coronavirus 2 (SARS-CoV-2)        HPI:  Zhao Fischer is a 74 year old white man with hypertension, hyperlipidemia, diabetes mellitus type 2, peripheral artery disease, arteriosclerotic cardiovascular disease. He lives in [place], [place]. He has been  since he was 16 years old. He enjoys tending his [place]. His primary care physician is Dr. Mannie Russell.    He contacted Dr. Russell on 3/16/2020 to request testing for COVID-19 due to nonproductive cough, shortness of breath, and fever for the past day, but he could not get tested due to limited outpatient testing. He was prescribed levofloxacin 500 mg daily.    He presented to [facility] Emergency Department on 3/21/2020 with persistent symptoms, including diarrhea, which is common with COVID-19 infection, and poor appetite. He reported that his wife had similar symptoms. He was hypoxic, with oxygen saturation of 88%. Chest X-ray showed interstitial lung opacities. WBC count was low at 2780. BNP, lactate, and procalcitonin were low. CRP was elevated at 154 mg/L. COVID-19 testing has been liberal in the emergency department (criteria of cough alone is enough) so he was tested. He was not given anything other than supplemental oxygen in the emergency department. He was admitted to [facility].     Overview/Hospital Course:  He was put on hydroxychloroquine and azithromycin. His hypoxia worsened overnight between 3/22/2020 and 3/23/2020. Pulmonology was consulted and he was intubated. COVID-19 result was reported positive on 3/24/2020. He developed acute  kidney injury on 3/24/2020. Palliative Care was consulted. Hydroxychloroquine was stopped early due to hypoglycemia. Nephrology was consulted on 3/26/2020. A left internal jugular trialysis catheter was placed on 3/27/2020. He finished a course of azithromycin on 3/27/2020. He started continuous renal replacement therapy and was given furosemide infusion to augment urine output. Pulmonology diagnosed him with chronic obstructive pulmonary disease and started albuterol-ipratropium nebulizer treatments. Repeat chest X-ray on 4/2/2020 showed similar lung opacities as recent X-rays. He developed new fever on 4/3/2020, so cefepime and vancomycin were started. He developed atrial fibrillation and ventricular tachycardia on 4/4/2020. Pulmonology discussed his lack of improvement and poor prognosis with his wife. He was made DNR and transitioned to comfort measures only.     Interval History: On assist control, rate 24, tidal volume 420, FiO2 80%, PEEP 12. Oxygen saturation 100%. On morphine drip.    Review of Systems   Unable to perform ROS: Intubated     Objective:     Vital Signs (Most Recent):  Temp: 98.8 °F (37.1 °C) (04/04/20 2315)  Pulse: (!) 160 (04/05/20 1400)  Resp: (!) 29 (04/05/20 1400)  BP: (!) 126/90 (04/05/20 1400)  SpO2: 99 % (04/05/20 1400) Vital Signs (24h Range):  Temp:  [98.8 °F (37.1 °C)-98.9 °F (37.2 °C)] 98.8 °F (37.1 °C)  Pulse:  [108-170] 160  Resp:  [21-56] 29  SpO2:  [85 %-100 %] 99 %  BP: (108-165)/(69-97) 126/90     Weight: 73.9 kg (162 lb 14.7 oz)  Body mass index is 24.77 kg/m².    Intake/Output Summary (Last 24 hours) at 4/5/2020 1601  Last data filed at 4/4/2020 2315  Gross per 24 hour   Intake 37.46 ml   Output 100 ml   Net -62.54 ml      Physical Exam   Constitutional: He appears well-developed. He is intubated.   Cardiovascular: Regular rhythm. Tachycardia present.   Pulmonary/Chest: He is intubated.   Irregular breathing pattern   Genitourinary:   Genitourinary Comments: Urine in Roca  bag   Nursing note and vitals reviewed.      Significant Labs: All pertinent labs within the past 24 hours have been reviewed.   Recent Labs   Lab 04/03/20  0935 04/03/20  1525 04/04/20  0734 04/04/20  1025   * 146* 141  144 140   K 4.8 4.9 5.3*  4.6 4.5    107 106  105 105   CO2 27 28 24  28 27   BUN 87* 97* 56*  58* 47*   CREATININE 2.2* 2.4* 1.7*  1.7* 1.4   CALCIUM 7.9* 7.8* 8.4*  8.6* 8.3*   PROT 5.6*  --  6.5  --    BILITOT 1.3*  --  0.9  --    ALKPHOS 105  --  115  --    ALT 27  --  27  --    AST 71*  --  52*  --        Significant Imaging: I have reviewed all pertinent imaging results/findings within the past 24 hours.       Assessment/Plan:      * Pneumonia due to severe acute respiratory syndrome coronavirus 2 (SARS-CoV-2)  Acute respiratory distress syndrome (ARDS) due to COVID-19 virus  Acute respiratory failure with hypoxia  Comfort measures only status  Appreciate Infectious Disease and Pulmonology. Treated with hydroxychloroquine, azithromycin, ceftriaxone. Still intubated. Chest X-ray shows persistent lung disease. Has not progressed in the right direction and is now on comfort measures.    Cardiomyopathy  Unclear chronicity, cannot be formally evaluated due to COVID-19 isolation. Could be preexisting, due to medications used to treat him acutely, or due to sepsis and hypoxia. At risk for fatal arrhythmia at this time, which was related to his wife.    Chronic obstructive pulmonary disease  Newly diagnosed by Pulmonology. Getting albuterol-ipratropium nebulizer treatments.    KEYUR (acute kidney injury)  Now on comfort measures.    Acute respiratory failure with hypoxia  See primary problem.    Essential hypertension  ASCVD (arteriosclerotic cardiovascular disease)  Hyperlipidemia   PAD (peripheral artery disease)    Type 2 diabetes mellitus with diabetic peripheral angiopathy without gangrene, without long-term current use of insulin  Stop checking glucose.    VTE Risk Mitigation  (From admission, onward)         Ordered     IP VTE HIGH RISK PATIENT  Once      03/21/20 1737                    Ezequiel Huang MD  Department of Hospital Medicine   [facility]

## 2020-04-06 NOTE — PLAN OF CARE
Patient on  with documented settings.  Alarms are set and functioning with adequate volumes.  AMBU bag and mask at bedside. Patient on comfort measures.

## 2020-04-06 NOTE — NURSING
Pt on nasal cannula sats 92-94%, tachycardic in 160's, tachypneic  , PRN morphine and ativan given.

## 2020-04-06 NOTE — PROGRESS NOTES
[facility] Progress Note    Subjective:      Zhao Fischer is a 74 y.o.  male who is being followed by the [facility] service at [facility] for KEYUR.  Patient is on BiPAP, COVID positive and comfort care.        Objective:   Last 24 Hour Vital Signs:  BP  Min: 90/57  Max: 122/64  Temp  Av.5 °F (36.9 °C)  Min: 98.1 °F (36.7 °C)  Max: 98.9 °F (37.2 °C)  Pulse  Av.9  Min: 126  Max: 144  Resp  Av.1  Min: 0  Max: 28  SpO2  Av.3 %  Min: 66 %  Max: 100 %  I/O last 3 completed shifts:  In: 734 [I.V.:734]  Out: 1100 [Urine:1100]    Physical Examination:  COVID positive, Exam deferred.       Laboratory:  Laboratory Data Reviewed: yes    Microbiology Data Reviewed: yes    Radiology Data Reviewed: yes    Current Medications:     Infusions:   fentanyl Stopped (20 1138)    morphine 15 mg/hr (20 0656)        Scheduled:   furosemide        glycopyrrolate  0.2 mg Intravenous Q4H        PRN:  acetaminophen, aluminum-magnesium hydroxide-simethicone, fentaNYL, lorazepam, morphine, morphine, ondansetron, sodium chloride 0.9%      Assessment and Plan:    Zhao Fischer is a 74 y.o.male with   #) Oliguric KEYUR   - Baseline Cr around 1.1, worsened to 3.2 today  - COVID 19 positive  - Urea Na: < 20, FeNA < 0.2, BUN/Cr: >20:1  - Dehydration vs hypotension (ATN) vs post renal  - s/p 500 cc bolus   - Patient is Net +3.7 L.  cc, Lasix 60 IV x Once at 12 noon, will monitor response, may need another dose.  - Will continue to monitor  - Renally dose all medications, avoid contrast and phos based enemas.  2020  - Cr stable at 3.2, UOP: 475 ml  - Lasix 80 mg IV and reaccess  2020  - Cr improved a little 3.0  - UOP: 680 cc  - Net 3.9+  - On Lasix gtt (10 mg/hr)   2020  - Cr: 3.4 > 2.4  - UOP: 1076 > 1830 ml, Net: +2.5L  - On Lasix gtt (10 mg/hr)  - On high therapy flow CRRT regimen, Will continue daily CRRT ( 4-hours, Therapy flow rate: 5.0 L/hr and -300,  Heparin gtt 500-750 units/hr if still clotting can try upto 1000 units, Monitor PTT).  03/31/2020  - Cr: 2.5 > 2.1  - UOP: 3229 ml, Net: +997.5 ml  - Changing CVVHD to CVVH to get a little better cytokine clearance  - Therapy fluid rate : 5.0 L.hr (73.9 x 60= 4.4), BFR: 250-300, UF goal; can go up to 200 ml/hr as tolerated. CVVH for 6-hours  - Give 2000 units of heparin IV stat and then heparin gtt 750 units/hr post-filter (CVVH).   04/01/2020  - Continue above regimen   04/02/2020  - Continue above  04/03/2020  - Cr Improved to 1.6  - UOP: 945  - Net: +13.7,   - Continue current regimen   04/06/2020  - Patient is on comfort care  - On BiPAP  - CMP today and lasix 80 IV x once, Last CRRT session on 04/03/2020, Cr on 04/04/2020: 1.4, good UOP.   - Will continue to monitor       Thank you for allowing us in taking care of this patient. Please call us if you have any questions regarding this patient.         Darline Dhaliwal MD  [facility]  [phone]     If after 5pm please forward any questions to the [facility] Fellow/Attending on call.

## 2020-04-06 NOTE — NURSING
Dr Fofana @ [facility], orders to stop morphine gtt to assess neuro status, increase FiO2 to 50% and PS to 10.  Will continue to monitor.

## 2020-04-07 NOTE — CONSULTS
"  [facility]  Adult Nutrition  Consult Note    SUMMARY     Recommendations    Recommendation:   1. When medically acceptable, increase TF of Glucerna 1.5 to goal rate of 50ml/hr as tolerated to provide 1800 kcal, 99g protein, & 911ml free water. Add 200ml free water flush qid.    Goals:   1. Pt will tolerate TF.   2. TF will meet at least 85% estimated kcal/protein needs  Nutrition Goal Status: (continues)  Communication of RD Recs: other (comment)(POC)    Reason for Assessment  Reason For Assessment: RD follow-up  Diagnosis: (pneumonia/COVID)  Relevant Medical History: HTN, DM, HLD, PAD, angioplasty, cardiac catherization  Interdisciplinary Rounds: did not attend  General Information Comments: Pt extubated last night and put on comfort care. However today pt folllowing commands and doing well off vent. Cosnutled to resume TF. NG tube placed. COVID+  Nutrition Discharge Planning: d/c needs to be determined    Nutrition Risk Screen  Nutrition Risk Screen: tube feeding or parenteral nutrition    Nutrition/Diet History  Food Preferences: unable to assess  Spiritual, Cultural Beliefs, Voodoo Practices, Values that Affect Care: yes  Factors Affecting Nutritional Intake: NPO    Anthropometrics  Temp: 98.1 °F (36.7 °C)  Height Method: Stated  Height: 5' 8" (172.7 cm)  Height (inches): 68 in  Weight Method: Bed Scale  Weight: 73.9 kg (162 lb 14.7 oz)  Weight (lb): 162.92 lb  Ideal Body Weight (IBW), Male: 154 lb  % Ideal Body Weight, Male (lb): 105.79 %  BMI (Calculated): 24.8  BMI Grade: 18.5-24.9 - normal     Lab/Procedures/Meds  Pertinent Labs Reviewed: reviewed  Pertinent Labs Comments: Na 149H, K 5.7H, BUN 127H, Crea 2.5H, Glu 183H, Ca 7.9L, Alb 1.5L  Pertinent Medications Reviewed: reviewed  Pertinent Medications Comments: Lasix    Estimated/Assessed Needs  Weight Used For Calorie Calculations: 70 kg (154 lb 5.2 oz)  Energy Calorie Requirements (kcal): 2100 (30 kcal/kg IBW)  Energy Need Method: " Kcal/kg  Protein Requirements: 70-84g (1.0-1.2g/kg IBW)  Weight Used For Protein Calculations: 70 kg (154 lb 5.2 oz)  Estimated Fluid Requirement Method: RDA Method  RDA Method (mL): 2100  CHO Requirement: 225g    Nutrition Prescription Ordered  Current Diet Order: NPO  Current Nutrition Support Formula Ordered: Glucerna 1.5  Current Nutrition Support Rate Ordered: 40 (ml)  Current Nutrition Support Frequency Ordered: ml/hr    Evaluation of Received Nutrient/Fluid Intake  Enteral Calories (kcal): 1440  Enteral Protein (gm): 79  Enteral (Free Water) Fluid (mL): 728  % Kcal Needs: 68  % Protein Needs:   I/O: 0/1000  Energy Calories Required: not meeting needs  Protein Required: meeting needs  Fluid Required: meeting needs  Comments: LBM 4/4  Tolerance: tolerating  % Intake of Estimated Energy Needs: 50 - 75 %  % Meal Intake: NPO    Nutrition Risk  Level of Risk/Frequency of Follow-up: (2xweekly)     Assessment and Plan  Acute respiratory distress syndrome (ARDS) due to COVID-19 virus  Contributing Nutrition Diagnosis  Inadequate energy intake    Related to (etiology):   intubation    Signs and Symptoms (as evidenced by):   NPO    Interventions:  Enteral nutrition    Nutrition Diagnosis Status:   Continues      Monitor and Evaluation  Food and Nutrient Intake: energy intake, food and beverage intake, enteral nutrition intake  Food and Nutrient Adminstration: diet order, enteral and parenteral nutrition administration  Knowledge/Beliefs/Attitudes: food and nutrition knowledge/skill  Physical Activity and Function: nutrition-related ADLs and IADLs  Anthropometric Measurements: weight, weight change, body mass index  Biochemical Data, Medical Tests and Procedures: electrolyte and renal panel, gastrointestinal profile, glucose/endocrine profile, inflammatory profile, lipid profile  Nutrition-Focused Physical Findings: overall appearance     Malnutrition Assessment  NFPE not performed, patient has been screened for  possible COVID-19 and has been placed on airborne and contact precautions. Patient is noted as being positive for COVID-19.    Nutrition Follow-Up    RD Follow-up?: Yes

## 2020-04-07 NOTE — PROGRESS NOTES
[facility] Pulmonary/Critical Care Resident Progress Note    Primary Team[facility]  Attending Physician: Ezequiel Huang MD    Subjective:      Extubated on . Remains tachycardic and tachypneic. Afebrile overnight.    Addendum: notified by nursing staff, patient is following simple commands this morning. Will shift potassium, keep in ICU, further care to continue     Objective:     Last 24 Hour Vital Signs:  BP  Min: 76/50  Max: 130/68  Temp  Av.7 °F (37.1 °C)  Min: 98.1 °F (36.7 °C)  Max: 98.9 °F (37.2 °C)  Pulse  Av.8  Min: 105  Max: 163  Resp  Av.8  Min: 0  Max: 47  SpO2  Av %  Min: 66 %  Max: 97 %  I/O last 3 completed shifts:  In: 0   Out: 425 [Urine:425]    Physical Examination:  Gen: encephalopathic, not following commands; copious oral secretions  HEENT: NCAT, PERRL, MMM, JVP ~6cm, no thyromegaly, lymphadenopathy appreciated, neck supple  CV: RRR, S1/S2 appreciated, no murmur gallop or rubs  Resp: Coarse breath sounds bilaterally, no increased WOB, no use of accessory muscles  Abdomen: Soft, NT, ND, +BS  Ext: 2+ distal pulses, no edema appreciated  Skin: Warm, dry, no rashes appreciated  Psych: confused  Neuro: not following commands, withdrawals to pain      Laboratory:  Trended Lab Data:  Recent Labs   Lab 20  0459 20  0935 20  0734   WBC 17.28* 26.60* 28.36*  28.36*   HGB 12.4* 12.5* 14.4  14.4   HCT 38.7* 39.6* 44.0  44.0    199 294  294       Recent Labs   Lab 20  1525 20  0734 20  1025 20  1330 20  2103 20  0410   * 141  144 140 146* 146* 148*   K 4.9 5.3*  4.6 4.5 6.1* 6.0*  6.0* 5.8*  5.8*    106  105 105 106 107 108   CO2 28 24  28 27 30* 28 27   BUN 97* 56*  58* 47* 114* 118* 124*   CREATININE 2.4* 1.7*  1.7* 1.4 2.7* 2.8* 2.6*   * 187*  189* 210* 186* 218* 187*   CALCIUM 7.8* 8.4*  8.6* 8.3* 7.7* 7.9* 7.8*   MG 2.5 2.6 2.5  --   --   --    PHOS 4.1 3.4 3.3  --    --   --        Recent Labs   Lab 04/03/20  0935 04/03/20  1525 04/04/20  0734 04/06/20  1330   PROT 5.6*  --  6.5 5.4*   ALBUMIN 1.5* 1.4* 1.6*  1.6* 1.5*   BILITOT 1.3*  --  0.9 0.6   AST 71*  --  52* 41*   ALT 27  --  27 21   ALKPHOS 105  --  115 61       No results for input(s): PROTIME, PTT, INR in the last 168 hours.    Cardiac:   Recent Labs   Lab 04/04/20  1025   TROPONINI 0.082*       FLP:   Lab Results   Component Value Date    TRIG 94 03/29/2020     DM:   Lab Results   Component Value Date    HGBA1C 7.4 (H) 01/28/2020    MICROALBUR 1.3 01/28/2020    CREATININE 2.6 (H) 04/07/2020     Thyroid: No results found for: TSH, FREET4, G3RDGYA, L8SQVUR, THYROIDAB  Anemia: No results found for: IRON, TIBC, FERRITIN, DWFMRXMD35, FOLATE  Urinalysis:   Lab Results   Component Value Date    COLORU Yellow 03/21/2020    SPECGRAV 1.010 03/21/2020    NITRITE Negative 03/21/2020    KETONESU Negative 03/21/2020    UROBILINOGEN Negative 03/21/2020       Microbiology:  Microbiology Results (last 7 days)     Procedure Component Value Units Date/Time    Blood culture [[id number]] Collected:  04/03/20 1208    Order Status:  Completed Specimen:  Blood from Antecubital, Left Updated:  04/06/20 1812     Blood Culture, Routine No Growth to date      No Growth to date      No Growth to date      No Growth to date    Blood culture [[id number]] Collected:  04/03/20 1208    Order Status:  Completed Specimen:  Blood Updated:  04/06/20 1812     Blood Culture, Routine No Growth to date      No Growth to date      No Growth to date      No Growth to date    Culture, Respiratory with Gram Stain [[id number]]  (Abnormal) Collected:  04/03/20 1116    Order Status:  Completed Specimen:  Respiratory from Tracheal Aspirate Updated:  04/06/20 1347     Respiratory Culture No S aureus or Pseudomonas isolated.      CANDIDA ALBICANS  Few        GHANSHYAM TROPICALIS  Few       Gram Stain (Respiratory) <10 epithelial cells per low power field.     Gram Stain  (Respiratory) Few WBC's     Gram Stain (Respiratory) No organisms seen        Current Medications:     Infusions:   fentanyl Stopped (04/05/20 1138)    morphine 15 mg/hr (04/06/20 0656)        Scheduled:   glycopyrrolate  0.2 mg Intravenous Q4H        PRN:  acetaminophen, aluminum-magnesium hydroxide-simethicone, [COMPLETED] calcium gluconate IVPB **AND** calcium gluconate IVPB, [COMPLETED] dextrose 50 % in water (D50W) **AND** dextrose 50 % in water (D50W) **AND** [COMPLETED] insulin regular, fentaNYL, lorazepam, morphine, morphine, ondansetron, sodium chloride 0.9%    Assessment:     Zhao Fischer is a 74 y.o.male with COPD, CKD, T2DM, HTN, HLD, PAD who was found to be COVID19+, intubated for a prolonged period of time, now extubated and currently in the ICU.      Plan:     Neuro:  #Acute Metabolic Encephalopathy  - remains off sedation, extubated on 4/6  - this morning, notified by nursing staff that patient mentation beginning to improve. Following simple commands, able to cough and adjust himself in bed  - Will update his wife, stop glyco, start CPT and humidified O2 on NC  - Discuss with Nephrology regarding CRRT vs PD    Cardiovascular/Hemodynamics:  #Sepsis 2/2 COVID19  #PAD  #HTN  - monitor, no acute interventions at this time    Respiratory:   #Acute Hypoxic Respiratory Failure  #COVID19  #Concerns for VAP  - Intubated 3/23/20  Extubated 4/6/20  - CPT q6  - humidified oxygen via NC    GI/FEN:  Fluid: +450cc  Electrolytes: Hyperkalemia  Nutrition: tube feeds; placed NG tube and confirmed position    #Hypernatremia  - free water deficit, will give free water through NG with feeds.  - will hold off on IVF for now     ID:   #COVID19  - completed 3.5 days of HCQ     #Concerns for VAP  - Resp Culture with candida albicans, tropicalis  - stopped antibiotics, monitor     Renal:   #KEYUR on CKD  - given lasix x1 yesterday  UOP 425cc  - lasix again today  - will revisit CRRT today with nephrology as patient  mentation is more appropriate, will discuss with wife regarding further care prior     #Hyperkalemia  - shift potassium    #Hypernatremia  - free water deficit, will replete via NG tube for now  - if worse tomorrow, will start free water via IVF    Heme/Onc:   Hgb stable      Endocrine:  #DM2  - monitor    Code:  DNR/DNI     Dispo: notified by nursing staff, patient is beginning to follow simple commands this morning. Will shift potassium, keep in ICU, further care to continue    Real Felix MD  [facility]    Pt seen and examined with Pulmonary/Critical Care team. Critical Care time was spent validating the history and physical exam, reviewing the lab and imaging results, and discussing the care of the patient with the bedside nurse. The following additional comments are made:    Pt follows simple commands (wiggle your toes). Needs better glycemic control. Feedings started.  Need to advance mobility.  Gas exchange good on nasal cannula. Ok to floor.    Critical Care time 45 minutes    Brigido Lewis MD  Phone [phone]

## 2020-04-07 NOTE — PROGRESS NOTES
Noted pt made DNR and now comfort care. Extubated 4/6. Please re-consult for any nutritional needs.

## 2020-04-07 NOTE — PROGRESS NOTES
Pt resting comfortably in bed on 3L NC. Remains tachycardic & tachypneic. PRN ativan and morphine given. Will continue to monitor and report to oncoming RN.

## 2020-04-07 NOTE — SUBJECTIVE & OBJECTIVE
Interval History: Extubated, still tachycardic  HyperNa- add  Free water flush 150 q6  Appreciates pulm and nephrology rec's   Treat hyperK- nephrology on board    Review of Systems   Unable to perform ROS: Mental status change     Objective:     Vital Signs (Most Recent):  Temp: 98.1 °F (36.7 °C) (04/07/20 1200)  Pulse: (!) 111 (04/07/20 1400)  Resp: (!) 27 (04/07/20 1400)  BP: 112/68 (04/07/20 1400)  SpO2: (!) 83 % (04/07/20 1400) Vital Signs (24h Range):  Temp:  [98.1 °F (36.7 °C)-98.9 °F (37.2 °C)] 98.1 °F (36.7 °C)  Pulse:  [105-159] 111  Resp:  [11-51] 27  SpO2:  [83 %-99 %] 83 %  BP: ()/(50-71) 112/68     Weight: 73.9 kg (162 lb 14.7 oz)  Body mass index is 24.77 kg/m².    Intake/Output Summary (Last 24 hours) at 4/7/2020 1857  Last data filed at 4/7/2020 0600  Gross per 24 hour   Intake 0 ml   Output 425 ml   Net -425 ml      Physical Exam   Constitutional: He appears well-developed. He appears lethargic.   Pulmonary/Chest:   Abnormal breathing pattern   Neurological: He appears lethargic.   Nursing note and vitals reviewed.      Significant Labs: All pertinent labs within the past 24 hours have been reviewed.    Significant Imaging: I have reviewed all pertinent imaging results/findings within the past 24 hours.

## 2020-04-07 NOTE — PROGRESS NOTES
[facility] Hospital Medicine  Progress Note    Patient Name: Zhao Fischer  MRN: [id number]  Patient Class: IP- Inpatient   Admission Date: 3/21/2020  Length of Stay: 14 days  Attending Physician: Ezequiel Huang MD  Primary Care Provider: Mannie Russell MD        Subjective:     Principal Problem:Pneumonia due to severe acute respiratory syndrome coronavirus 2 (SARS-CoV-2)        HPI:  Zhao Fischer is a 74 year old white man with hypertension, hyperlipidemia, diabetes mellitus type 2, peripheral artery disease, arteriosclerotic cardiovascular disease. He lives in [place], [place]. He has been  since he was 16 years old. He enjoys tending his [employer]. His primary care physician is Dr. Mannie Russell.    He contacted Dr. Russell on 3/16/2020 to request testing for COVID-19 due to nonproductive cough, shortness of breath, and fever for the past day, but he could not get tested due to limited outpatient testing. He was prescribed levofloxacin 500 mg daily.    He presented to [facility] Emergency [facility] on 3/21/2020 with persistent symptoms, including diarrhea, which is common with COVID-19 infection, and poor appetite. He reported that his wife had similar symptoms. He was hypoxic, with oxygen saturation of 88%. Chest X-ray showed interstitial lung opacities. WBC count was low at 2780. BNP, lactate, and procalcitonin were low. CRP was elevated at 154 mg/L. COVID-19 testing has been liberal in the emergency department (criteria of cough alone is enough) so he was tested. He was not given anything other than supplemental oxygen in the emergency department. He was admitted to [facility].     Overview/Hospital Course:  He was put on hydroxychloroquine and azithromycin. His hypoxia worsened overnight between 3/22/2020 and 3/23/2020. Pulmonology was consulted and he was intubated. COVID-19 result was reported positive on 3/24/2020. He developed acute  kidney injury on 3/24/2020. Palliative Care was consulted. Hydroxychloroquine was stopped early due to hypoglycemia. Nephrology was consulted on 3/26/2020. A left internal jugular trialysis catheter was placed on 3/27/2020. He finished a course of azithromycin on 3/27/2020. He started continuous renal replacement therapy and was given furosemide infusion to augment urine output. Pulmonology diagnosed him with chronic obstructive pulmonary disease and started albuterol-ipratropium nebulizer treatments. Repeat chest X-ray on 4/2/2020 showed similar lung opacities as recent X-rays. He developed new fever on 4/3/2020, so cefepime and vancomycin were started. He developed atrial fibrillation and ventricular tachycardia on 4/4/2020. Pulmonology discussed his lack of improvement and poor prognosis with his wife. He was made DNR and transitioned to comfort measures only. He was extubated in the afternoon of 4/6/2020.    Interval History: Extubated.    Review of Systems   Unable to perform ROS: Mental status change     Objective:     Vital Signs (Most Recent):  Temp: 98.1 °F (36.7 °C) (04/06/20 1200)  Pulse: (!) 132 (04/06/20 1830)  Resp: 19 (04/06/20 1830)  BP: (!) 100/59 (04/06/20 1830)  SpO2: (!) 92 % (04/06/20 1830) Vital Signs (24h Range):  Temp:  [98.1 °F (36.7 °C)-98.9 °F (37.2 °C)] 98.1 °F (36.7 °C)  Pulse:  [108-142] 132  Resp:  [0-26] 19  SpO2:  [66 %-96 %] 92 %  BP: ()/(50-66) 100/59     Weight: 73.9 kg (162 lb 14.7 oz)  Body mass index is 24.77 kg/m².  No intake or output data in the 24 hours ending 04/06/20 1924   Physical Exam   Constitutional: He appears well-developed. He appears lethargic.   Pulmonary/Chest:   Abnormal breathing pattern   Neurological: He appears lethargic.   Nursing note and vitals reviewed.      Significant Labs: All pertinent labs within the past 24 hours have been reviewed.    Significant Imaging: I have reviewed all pertinent imaging results/findings within the past 24  hours.      Assessment/Plan:      * Pneumonia due to severe acute respiratory syndrome coronavirus 2 (SARS-CoV-2)  Acute respiratory distress syndrome (ARDS) due to COVID-19 virus  Acute respiratory failure with hypoxia  Comfort measures only status  Appreciate Infectious Disease and Pulmonology. Treated with hydroxychloroquine, azithromycin, ceftriaxone. Still intubated. Chest X-ray shows persistent lung disease. Has not progressed in the right direction and is now on comfort measures.    Cardiomyopathy  Unclear chronicity, cannot be formally evaluated due to COVID-19 isolation. Could be preexisting, due to medications used to treat him acutely, or due to sepsis and hypoxia. At risk for fatal arrhythmia at this time, which was related to his wife.    Chronic obstructive pulmonary disease  Newly diagnosed by Pulmonology. Getting albuterol-ipratropium nebulizer treatments.    KEYUR (acute kidney injury)  Now on comfort measures.    Acute respiratory failure with hypoxia  See primary problem.    Essential hypertension  ASCVD (arteriosclerotic cardiovascular disease)  Hyperlipidemia   PAD (peripheral artery disease)    Type 2 diabetes mellitus with diabetic peripheral angiopathy without gangrene, without long-term current use of insulin  Stop checking glucose.      VTE Risk Mitigation (From admission, onward)         Ordered     IP VTE HIGH RISK PATIENT  Once      03/21/20 0338                Disposition: On comfort measures, death imminent.      Ezequiel Huang MD  Department of Hospital Medicine   [facility]

## 2020-04-07 NOTE — NURSING
PT following commands, nodding at times appropriately, repositioning attempts on his own, and assisting with cough.  Dr. Hughes notified of neuro exam change.  Wife will be updated by MD.  New orders to stop glyco, start tube feeds, start CPT, and add humidified oxygen via NC

## 2020-04-07 NOTE — PROGRESS NOTES
[facility] Nephrology Progress Note    Subjective:      Zhao Fischer is a 74 y.o.  male who is being followed by the [facility] Nephrology service at [facility] for KEYUR in setting of COVID.  Extubated yesterday, following simple commands this am per nursing. Continue to follow.      Objective:   Last 24 Hour Vital Signs:  BP  Min: 76/50  Max: 130/68  Temp  Av.7 °F (37.1 °C)  Min: 98.1 °F (36.7 °C)  Max: 98.9 °F (37.2 °C)  Pulse  Av.2  Min: 105  Max: 163  Resp  Av.9  Min: 0  Max: 51  SpO2  Av.2 %  Min: 86 %  Max: 99 %  I/O last 3 completed shifts:  In: 0   Out: 1000 [Urine:1000]    Physical Examination:  COVID positive, Exam deferred.       Laboratory:  Laboratory Data Reviewed: yes    Microbiology Data Reviewed: yes    Radiology Data Reviewed: yes    Current Medications:     Infusions:       Scheduled:   heparin (porcine)  5,000 Units Subcutaneous Q12H    lactated ringers  1,000 mL Intravenous Once        PRN:  acetaminophen, aluminum-magnesium hydroxide-simethicone, fentaNYL, lorazepam, morphine, morphine, ondansetron, sodium chloride 0.9%      Assessment and Plan:    Zhao Fischer is a 74 y.o.male with   #) Oliguric KEYUR   - Baseline Cr around 1.1, worsened to 3.2 today  - COVID 19 positive  - Urea Na: < 20, FeNA < 0.2, BUN/Cr: >20:1  - Dehydration vs hypotension (ATN) vs post renal  - s/p 500 cc bolus   - Patient is Net +3.7 L.  cc, Lasix 60 IV x Once at 12 noon, will monitor response, may need another dose.  - Will continue to monitor  - Renally dose all medications, avoid contrast and phos based enemas.  2020  - Cr stable at 3.2, UOP: 475 ml  - Lasix 80 mg IV and reaccess  2020  - Cr improved a little 3.0  - UOP: 680 cc  - Net 3.9+  - On Lasix gtt (10 mg/hr)   2020  - Cr: 3.4 > 2.4  - UOP: 1076 > 1830 ml, Net: +2.5L  - On Lasix gtt (10 mg/hr)  - On high therapy flow CRRT regimen, Will continue daily CRRT ( 4-hours, Therapy flow rate: 5.0 L/hr and BFR  250-300, Heparin gtt 500-750 units/hr if still clotting can try upto 1000 units, Monitor PTT).  03/31/2020  - Cr: 2.5 > 2.1  - UOP: 3229 ml, Net: +997.5 ml  - Changing CVVHD to CVVH to get a little better cytokine clearance  - Therapy fluid rate : 5.0 L.hr (73.9 x 60= 4.4), BFR: 250-300, UF goal; can go up to 200 ml/hr as tolerated. CVVH for 6-hours  - Give 2000 units of heparin IV stat and then heparin gtt 750 units/hr post-filter (CVVH).   04/01/2020  - Continue above regimen   04/02/2020  - Continue above  04/03/2020  - Cr Improved to 1.6  - UOP: 945  - Net: +13.7,   - Continue current regimen   04/06/2020  - Patient is on comfort care  - On BiPAP  - CMP today and lasix 80 IV x once, Last CRRT session on 04/03/2020, Cr on 04/04/2020: 1.4, good UOP.   - Will continue to monitor    04/07/2020  - UOP: 1000 cc  - Hold lasix     Thank you for allowing us in taking care of this patient. Please call us if you have any questions regarding this patient.         Darline Dhaliwal MD  [facility]  [phone]     If after 5pm please forward any questions to the [facility] Ne[facility] Fellow/Attending on call.

## 2020-04-07 NOTE — PLAN OF CARE
Recommendation:   1. When medically acceptable, increase TF of Glucerna 1.5 to goal rate of 50ml/hr as tolerated to provide 1800 kcal, 99g protein, & 911ml free water. Add 200ml free water flush qid.    Goals:   1. Pt will tolerate TF.   2. TF will meet at least 85% estimated kcal/protein needs  Nutrition Goal Status: (continues)  Communication of RD Recs: other (comment)(POC)

## 2020-04-07 NOTE — SUBJECTIVE & OBJECTIVE
Interval History: Extubated.    Review of Systems   Unable to perform ROS: Mental status change     Objective:     Vital Signs (Most Recent):  Temp: 98.1 °F (36.7 °C) (04/06/20 1200)  Pulse: (!) 132 (04/06/20 1830)  Resp: 19 (04/06/20 1830)  BP: (!) 100/59 (04/06/20 1830)  SpO2: (!) 92 % (04/06/20 1830) Vital Signs (24h Range):  Temp:  [98.1 °F (36.7 °C)-98.9 °F (37.2 °C)] 98.1 °F (36.7 °C)  Pulse:  [108-142] 132  Resp:  [0-26] 19  SpO2:  [66 %-96 %] 92 %  BP: ()/(50-66) 100/59     Weight: 73.9 kg (162 lb 14.7 oz)  Body mass index is 24.77 kg/m².  No intake or output data in the 24 hours ending 04/06/20 1924   Physical Exam   Constitutional: He appears well-developed. He appears lethargic.   Pulmonary/Chest:   Abnormal breathing pattern   Neurological: He appears lethargic.   Nursing note and vitals reviewed.      Significant Labs: All pertinent labs within the past 24 hours have been reviewed.    Significant Imaging: I have reviewed all pertinent imaging results/findings within the past 24 hours.

## 2020-04-08 NOTE — PROGRESS NOTES
[facility] Medicine  Progress Note    Patient Name: Zhao Fischer  MRN: [id number]  Patient Class: IP- Inpatient   Admission Date: 3/21/2020  Length of Stay: 15 days  Attending Physician: Rafaela Romero*  Primary Care Provider: Mannie Russell MD        Subjective:     Principal Problem:Pneumonia due to severe acute respiratory syndrome coronavirus 2 (SARS-CoV-2)        HPI:  Zhao Fischer is a 74 year old white man with hypertension, hyperlipidemia, diabetes mellitus type 2, peripheral artery disease, arteriosclerotic cardiovascular disease. He lives in [place], [place]. He has been  since he was 16 years old. He enjoys tending his [employer]. His primary care physician is Dr. Mannie Russell.    He contacted Dr. Russell on 3/16/2020 to request testing for COVID-19 due to nonproductive cough, shortness of breath, and fever for the past day, but he could not get tested due to limited outpatient testing. He was prescribed levofloxacin 500 mg daily.    He presented to [facility] on 3/21/2020 with persistent symptoms, including diarrhea, which is common with COVID-19 infection, and poor appetite. He reported that his wife had similar symptoms. He was hypoxic, with oxygen saturation of 88%. Chest X-ray showed interstitial lung opacities. WBC count was low at 2780. BNP, lactate, and procalcitonin were low. CRP was elevated at 154 mg/L. COVID-19 testing has been liberal in the emergency department (criteria of cough alone is enough) so he was tested. He was not given anything other than supplemental oxygen in the emergency department. He was admitted to [facility].     Overview/Hospital Course:  He was put on hydroxychloroquine and azithromycin. His hypoxia worsened overnight between 3/22/2020 and 3/23/2020. Pulmonology was consulted and he was intubated. COVID-19 result was reported positive on 3/24/2020. He developed  acute kidney injury on 3/24/2020. Palliative Care was consulted. Hydroxychloroquine was stopped early due to hypoglycemia. Nephrology was consulted on 3/26/2020. A left internal jugular trialysis catheter was placed on 3/27/2020. He finished a course of azithromycin on 3/27/2020. He started continuous renal replacement therapy and was given furosemide infusion to augment urine output. Pulmonology diagnosed him with chronic obstructive pulmonary disease and started albuterol-ipratropium nebulizer treatments. Repeat chest X-ray on 4/2/2020 showed similar lung opacities as recent X-rays. He developed new fever on 4/3/2020, so cefepime and vancomycin were started. He developed atrial fibrillation and ventricular tachycardia on 4/4/2020. Pulmonology discussed his lack of improvement and poor prognosis with his wife. He was made DNR and transitioned to comfort measures only. He was extubated in the afternoon of 4/6/2020.    Interval History: Extubated, still tachycardic  HyperNa- add  Free water flush 150 q6  Appreciates pulm and nephrology rec's   Treat hyperK- nephrology on board    Review of Systems   Unable to perform ROS: Mental status change     Objective:     Vital Signs (Most Recent):  Temp: 98.1 °F (36.7 °C) (04/07/20 1200)  Pulse: (!) 111 (04/07/20 1400)  Resp: (!) 27 (04/07/20 1400)  BP: 112/68 (04/07/20 1400)  SpO2: (!) 83 % (04/07/20 1400) Vital Signs (24h Range):  Temp:  [98.1 °F (36.7 °C)-98.9 °F (37.2 °C)] 98.1 °F (36.7 °C)  Pulse:  [105-159] 111  Resp:  [11-51] 27  SpO2:  [83 %-99 %] 83 %  BP: ()/(50-71) 112/68     Weight: 73.9 kg (162 lb 14.7 oz)  Body mass index is 24.77 kg/m².    Intake/Output Summary (Last 24 hours) at 4/7/2020 1857  Last data filed at 4/7/2020 0600  Gross per 24 hour   Intake 0 ml   Output 425 ml   Net -425 ml      Physical Exam   Constitutional: He appears well-developed. He appears lethargic.   Pulmonary/Chest:   Abnormal breathing pattern   Neurological: He appears  lethargic.   Nursing note and vitals reviewed.      Significant Labs: All pertinent labs within the past 24 hours have been reviewed.    Significant Imaging: I have reviewed all pertinent imaging results/findings within the past 24 hours.      Assessment/Plan:      * Pneumonia due to severe acute respiratory syndrome coronavirus 2 (SARS-CoV-2)  Acute respiratory distress syndrome (ARDS) due to COVID-19 virus  Acute respiratory failure with hypoxia  Comfort measures only status  Appreciate Infectious Disease and Pulmonology. Treated with hydroxychloroquine, azithromycin, ceftriaxone. Still intubated. Chest X-ray shows persistent lung disease. Has not progressed in the right direction and is now on comfort measures.    Cardiomyopathy  Unclear chronicity, cannot be formally evaluated due to COVID-19 isolation. Could be preexisting, due to medications used to treat him acutely, or due to sepsis and hypoxia. At risk for fatal arrhythmia at this time, which was related to his wife.    Chronic obstructive pulmonary disease  Newly diagnosed by Pulmonology. Getting albuterol-ipratropium nebulizer treatments.    KEYUR (acute kidney injury)  Now on comfort measures.    Acute respiratory failure with hypoxia  See primary problem.    Essential hypertension  ASCVD (arteriosclerotic cardiovascular disease)  Hyperlipidemia   PAD (peripheral artery disease)    Type 2 diabetes mellitus with diabetic peripheral angiopathy without gangrene, without long-term current use of insulin  Stop checking glucose.      VTE Risk Mitigation (From admission, onward)         Ordered     heparin (porcine) injection 5,000 Units  Every 12 hours      04/07/20 1031     IP VTE HIGH RISK PATIENT  Once      03/21/20 2227                Critical care time spent on the evaluation and treatment of severe organ dysfunction, review of pertinent labs and imaging studies, discussions with consulting providers and discussions with patient/family: 35  minutes.      Rafaela Romero MD  Department of Hospital Medicine   [facility]

## 2020-04-08 NOTE — SUBJECTIVE & OBJECTIVE
Interval History:  Alert, lying in bed, still tachycardic and tachypneic, on nasal cannula.  Worsening hypernatremia- increase free water flush and dextrose 5%  Renal function improving, patient diuresing    Review of Systems   Unable to perform ROS: Mental status change     Objective:     Vital Signs (Most Recent):  Temp: 98.2 °F (36.8 °C) (04/08/20 0800)  Pulse: (!) 124 (04/08/20 0900)  Resp: (!) 31 (04/08/20 0900)  BP: 110/62 (04/08/20 0900)  SpO2: (!) 88 % (04/08/20 0900) Vital Signs (24h Range):  Temp:  [98.2 °F (36.8 °C)-99 °F (37.2 °C)] 98.2 °F (36.8 °C)  Pulse:  [101-245] 124  Resp:  [15-47] 31  SpO2:  [79 %-94 %] 88 %  BP: (100-129)/(57-72) 110/62     Weight: 73.9 kg (162 lb 14.7 oz)  Body mass index is 24.77 kg/m².    Intake/Output Summary (Last 24 hours) at 4/8/2020 1353  Last data filed at 4/8/2020 0800  Gross per 24 hour   Intake 250 ml   Output 1275 ml   Net -1025 ml      Physical Exam   Constitutional: He appears well-developed. He appears lethargic.   Pulmonary/Chest:   Abnormal breathing pattern   Neurological: He appears lethargic.   Nursing note and vitals reviewed.      Significant Labs:   ABGs: No results for input(s): PH, PCO2, HCO3, POCSATURATED, BE, TOTALHB, COHB, METHB, O2HB, POCFIO2 in the last 48 hours.  Blood Culture: No results for input(s): LABBLOO in the last 48 hours.  CBC: No results for input(s): WBC, HGB, HCT, PLT in the last 48 hours.  CMP:   Recent Labs   Lab 04/07/20  1313 04/07/20 2001 04/08/20  0359   * 147* 152*  152*   K 5.3*  5.3* 5.6*  5.6* 4.6  4.6  4.6  4.6    109 112*  112*   CO2 29 26 27  27   * 247* 245*  245*   * 131* 127*  127*   CREATININE 2.4* 2.2* 2.0*  2.0*   CALCIUM 7.7* 7.7* 7.9*  7.9*   ANIONGAP 10 12 13  13   EGFRNONAA 26* 28* 32*  32*     Coagulation: No results for input(s): PT, INR, APTT in the last 48 hours.  Lactic Acid: No results for input(s): LACTATE in the last 48 hours.  TSH: No results for input(s): TSH in  the last 4320 hours.  Urine Culture: No results for input(s): LABURIN in the last 48 hours.  Urine Studies: No results for input(s): COLORU, APPEARANCEUA, PHUR, SPECGRAV, PROTEINUA, GLUCUA, KETONESU, BILIRUBINUA, OCCULTUA, NITRITE, UROBILINOGEN, LEUKOCYTESUR, RBCUA, WBCUA, BACTERIA, SQUAMEPITHEL, HYALINECASTS in the last 48 hours.    Invalid input(s): MYRA    Significant Imaging: I have reviewed all pertinent imaging results/findings within the past 24 hours.

## 2020-04-08 NOTE — PROGRESS NOTES
[facility] Progress Note    Subjective:      Zhao Fischer is a 74 y.o.  male who is being followed by the [facility] service at [facility] for KEYUR in setting of COVID.  Patient was extubated on, on NC, following simple commands per nursing.       Objective:   Last 24 Hour Vital Signs:  BP  Min: 77/50  Max: 129/70  Temp  Av.6 °F (37 °C)  Min: 98.1 °F (36.7 °C)  Max: 99 °F (37.2 °C)  Pulse  Av.4  Min: 101  Max: 245  Resp  Av.7  Min: 11  Max: 47  SpO2  Av %  Min: 79 %  Max: 99 %  I/O last 3 completed shifts:  In: 0   Out: 1425 [Urine:1425]    Physical Examination:  COVID positive, Exam deferred.       Laboratory:  Laboratory Data Reviewed: yes    Microbiology Data Reviewed: yes    Radiology Data Reviewed: yes    Current Medications:     Infusions:       Scheduled:   atorvastatin  40 mg Per OG tube Daily    clopidogreL  75 mg Per OG tube Daily    heparin (porcine)  5,000 Units Subcutaneous Q12H    insulin detemir U-100  10 Units Subcutaneous Daily        PRN:  acetaminophen, aluminum-magnesium hydroxide-simethicone, morphine, ondansetron, sodium chloride 0.9%      Assessment and Plan:    Zhao Fischer is a 74 y.o.male with   #) Oliguric KEYUR   - Baseline Cr around 1.1, worsened to 3.2 today  - COVID 19 positive  - Urea Na: < 20, FeNA < 0.2, BUN/Cr: >20:1  - Dehydration vs hypotension (ATN) vs post renal  - s/p 500 cc bolus   - Patient is Net +3.7 L.  cc, Lasix 60 IV x Once at 12 noon, will monitor response, may need another dose.  - Will continue to monitor  - Renally dose all medications, avoid contrast and phos based enemas.  2020  - Cr stable at 3.2, UOP: 475 ml  - Lasix 80 mg IV and reaccess  2020  - Cr improved a little 3.0  - UOP: 680 cc  - Net 3.9+  - On Lasix gtt (10 mg/hr)   2020  - Cr: 3.4 > 2.4  - UOP: 1076 > 1830 ml, Net: +2.5L  - On Lasix gtt (10 mg/hr)  - On high therapy flow CRRT regimen, Will continue daily CRRT ( 4-hours,  Therapy flow rate: 5.0 L/hr and -300, Heparin gtt 500-750 units/hr if still clotting can try upto 1000 units, Monitor PTT).  03/31/2020  - Cr: 2.5 > 2.1  - UOP: 3229 ml, Net: +997.5 ml  - Changing CVVHD to CVVH to get a little better cytokine clearance  - Therapy fluid rate : 5.0 L.hr (73.9 x 60= 4.4), BFR: 250-300, UF goal; can go up to 200 ml/hr as tolerated. CVVH for 6-hours  - Give 2000 units of heparin IV stat and then heparin gtt 750 units/hr post-filter (CVVH).   04/01/2020  - Continue above regimen   04/02/2020  - Continue above  04/03/2020  - Cr Improved to 1.6  - UOP: 945  - Net: +13.7,   - Continue current regimen   04/06/2020  - Patient is on comfort care  - On BiPAP  - CMP today and lasix 80 IV x once, Last CRRT session on 04/03/2020, Cr on 04/04/2020: 1.4, good UOP.   - Will continue to monitor    04/07/2020  - UOP: 1000 cc  - Hold lasix  04/08/2020  - UOP: 1000 cc   - Cr improved to 2.0  - Patient would not be needing dialysis, recommend pulling out trialysis.  - Hold Lasix  - Will continue to follow.      Thank you for allowing us in taking care of this patient. Please call us if you have any questions regarding this patient.         Darline Dhaliwal MD  [facility]  [phone]     If after 5pm please forward any questions to the [facility] Fellow/Attending on call.

## 2020-04-08 NOTE — PLAN OF CARE
Afebrile overnight, still following simple commands overnight. Tolerated tube feeds overnight, but still free water down. Will need therapy.   The pt remains in ICU after being extubated on 4-6,now on nc. The pt may possibly step down soon. The Sw spoke to the pt's wife Heidi [phone] via phone and gave her an update on the pt. The Sw encouraged her to continue calling should she have any further questions or concerns. The Sw will continue to follow the pt throughout his transitions of care and will assist with any d/c needs.       04/08/20 1253   Discharge Reassessment   Assessment Type Final Discharge Note   Provided patient/caregiver education on the expected discharge date and the discharge plan Yes   Do you have any problems affording any of your prescribed medications? No   Discharge Plan A Home Health   Discharge Plan B Skilled Nursing Facility   DME Needed Upon Discharge    (TBD)   Patient choice form signed by patient/caregiver No   Anticipated Discharge Disposition Home-Health   Can the patient/caregiver answer the patient profile reliably? Yes, cognitively intact   How does the patient rate their overall health at the present time? Poor   Describe the patient's ability to walk at the present time. Does not walk or unable to take any steps at all   How often would a person be available to care for the patient? Whenever needed   Number of comorbid conditions (as recorded on the chart) Four   During the past month, has the patient often been bothered by feeling down, depressed or hopeless? Yes   During the past month, has the patient often been bothered by little interest or pleasure in doing things? No   Post-Acute Status   Post-Acute Authorization Home Health

## 2020-04-08 NOTE — NURSING
Pt more tachypneic, having longer agonal periods, O2 sats remaining in 80's.  Dr Hughes alerted and wife contacted.  Orders for comfort measures only obtained.  Morphine IVP given, gtt started.

## 2020-04-08 NOTE — PROGRESS NOTES
[facility]  Progress Note    Patient Name: Zhao Fischer  MRN: [id number]  Patient Class: IP- Inpatient   Admission Date: 3/21/2020  Length of Stay: 16 days  Attending Physician: Rafaela Romero*  Primary Care Provider: Mannie Russell MD        Subjective:     Principal Problem:Pneumonia due to severe acute respiratory syndrome coronavirus 2 (SARS-CoV-2)        HPI:  Zhao Fischer is a 74 year old white man with hypertension, hyperlipidemia, diabetes mellitus type 2, peripheral artery disease, arteriosclerotic cardiovascular disease. He lives in [place], [place]. He has been  since he was 16 years old. 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 His primary care physician is Dr. Mannie Russell.    He contacted Dr. Russell on 3/16/2020 to request testing for COVID-19 due to nonproductive cough, shortness of breath, and fever for the past day, but he could not get tested due to limited outpatient testing. He was prescribed levofloxacin 500 mg daily.    He presented to [facility] on 3/21/2020 with persistent symptoms, including diarrhea, which is common with COVID-19 infection, and poor appetite. He reported that his wife had similar symptoms. He was hypoxic, with oxygen saturation of 88%. Chest X-ray showed interstitial lung opacities. WBC count was low at 2780. BNP, lactate, and procalcitonin were low. CRP was elevated at 154 mg/L. COVID-19 testing has been liberal in the emergency department (criteria of cough alone is enough) so he was tested. He was not given anything other than supplemental oxygen in the emergency department. He was admitted to [facility].     Overview/Hospital Course:  He was put on hydroxychloroquine and azithromycin. His hypoxia worsened overnight between 3/22/2020 and 3/23/2020. Pulmonology was consulted and he was intubated. COVID-19 result was reported positive on 3/24/2020. He developed  acute kidney injury on 3/24/2020. Palliative Care was consulted. Hydroxychloroquine was stopped early due to hypoglycemia. Nephrology was consulted on 3/26/2020. A left internal jugular trialysis catheter was placed on 3/27/2020. He finished a course of azithromycin on 3/27/2020. He started continuous renal replacement therapy and was given furosemide infusion to augment urine output. Pulmonology diagnosed him with chronic obstructive pulmonary disease and started albuterol-ipratropium nebulizer treatments. Repeat chest X-ray on 4/2/2020 showed similar lung opacities as recent X-rays. He developed new fever on 4/3/2020, so cefepime and vancomycin were started. He developed atrial fibrillation and ventricular tachycardia on 4/4/2020. Pulmonology discussed his lack of improvement and poor prognosis with his wife. He was made DNR and transitioned to comfort measures only. He was extubated in the afternoon of 4/6/2020.    Interval History:  Alert, lying in bed, still tachycardic and tachypneic, on nasal cannula.  Worsening hypernatremia- increase free water flush and dextrose 5%  Renal function improving, patient diuresing    Review of Systems   Unable to perform ROS: Mental status change     Objective:     Vital Signs (Most Recent):  Temp: 98.2 °F (36.8 °C) (04/08/20 0800)  Pulse: (!) 124 (04/08/20 0900)  Resp: (!) 31 (04/08/20 0900)  BP: 110/62 (04/08/20 0900)  SpO2: (!) 88 % (04/08/20 0900) Vital Signs (24h Range):  Temp:  [98.2 °F (36.8 °C)-99 °F (37.2 °C)] 98.2 °F (36.8 °C)  Pulse:  [101-245] 124  Resp:  [15-47] 31  SpO2:  [79 %-94 %] 88 %  BP: (100-129)/(57-72) 110/62     Weight: 73.9 kg (162 lb 14.7 oz)  Body mass index is 24.77 kg/m².    Intake/Output Summary (Last 24 hours) at 4/8/2020 1353  Last data filed at 4/8/2020 0800  Gross per 24 hour   Intake 250 ml   Output 1275 ml   Net -1025 ml      Physical Exam   Constitutional: He appears well-developed. He appears lethargic.   Pulmonary/Chest:   Abnormal  breathing pattern   Neurological: He appears lethargic.   Nursing note and vitals reviewed.      Significant Labs:   ABGs: No results for input(s): PH, PCO2, HCO3, POCSATURATED, BE, TOTALHB, COHB, METHB, O2HB, POCFIO2 in the last 48 hours.  Blood Culture: No results for input(s): LABBLOO in the last 48 hours.  CBC: No results for input(s): WBC, HGB, HCT, PLT in the last 48 hours.  CMP:   Recent Labs   Lab 04/07/20  1313 04/07/20 2001 04/08/20  0359   * 147* 152*  152*   K 5.3*  5.3* 5.6*  5.6* 4.6  4.6  4.6  4.6    109 112*  112*   CO2 29 26 27  27   * 247* 245*  245*   * 131* 127*  127*   CREATININE 2.4* 2.2* 2.0*  2.0*   CALCIUM 7.7* 7.7* 7.9*  7.9*   ANIONGAP 10 12 13  13   EGFRNONAA 26* 28* 32*  32*     Coagulation: No results for input(s): PT, INR, APTT in the last 48 hours.  Lactic Acid: No results for input(s): LACTATE in the last 48 hours.  TSH: No results for input(s): TSH in the last 4320 hours.  Urine Culture: No results for input(s): LABURIN in the last 48 hours.  Urine Studies: No results for input(s): COLORU, APPEARANCEUA, PHUR, SPECGRAV, PROTEINUA, GLUCUA, KETONESU, BILIRUBINUA, OCCULTUA, NITRITE, UROBILINOGEN, LEUKOCYTESUR, RBCUA, WBCUA, BACTERIA, SQUAMEPITHEL, HYALINECASTS in the last 48 hours.    Invalid input(s): WRIGHTSUR    Significant Imaging: I have reviewed all pertinent imaging results/findings within the past 24 hours.      Assessment/Plan:      * Pneumonia due to severe acute respiratory syndrome coronavirus 2 (SARS-CoV-2)  Acute respiratory distress syndrome (ARDS) due to COVID-19 virus  Acute respiratory failure with hypoxia  Comfort measures only status  Appreciate Infectious Disease and Pulmonology. Treated with hydroxychloroquine, azithromycin, ceftriaxone. Still intubated. Chest X-ray shows persistent lung disease. Has not progressed in the right direction and is now on comfort measures.    Cardiomyopathy  Unclear chronicity, cannot be  formally evaluated due to COVID-19 isolation. Could be preexisting, due to medications used to treat him acutely, or due to sepsis and hypoxia. At risk for fatal arrhythmia at this time, which was related to his wife.    Chronic obstructive pulmonary disease  Newly diagnosed by Pulmonology. Getting albuterol-ipratropium nebulizer treatments.    KEYUR (acute kidney injury)  Now on comfort measures.    Acute respiratory failure with hypoxia  See primary problem.    Essential hypertension  ASCVD (arteriosclerotic cardiovascular disease)  Hyperlipidemia   PAD (peripheral artery disease)    Type 2 diabetes mellitus with diabetic peripheral angiopathy without gangrene, without long-term current use of insulin  Stop checking glucose.      VTE Risk Mitigation (From admission, onward)         Ordered     heparin (porcine) injection 5,000 Units  Every 12 hours      04/07/20 1031     IP VTE HIGH RISK PATIENT  Once      03/21/20 2227                Critical care time spent on the evaluation and treatment of severe organ dysfunction, review of pertinent labs and imaging studies, discussions with consulting providers and discussions with patient/family: 35 minutes.      Rafaela Romero MD  Department of Hospital Medicine   [facility]

## 2020-04-08 NOTE — PLAN OF CARE
Patient on oxygen with documented flow.  Will attempt to wean per O2 order protocol.   Will continue to monitor.

## 2020-04-08 NOTE — PROGRESS NOTES
[facility] Pulmonary/Critical Care Resident Progress Note    Primary Team: [facility]ist [facility]  Attending Physician: Rafaela Khan-Itawilda*    Subjective:      Afebrile overnight, still following simple commands overnight. Tolerated tube feeds overnight, but still free water down. Will need therapy.     Objective:     Last 24 Hour Vital Signs:  BP  Min: 77/50  Max: 125/59  Temp  Av.7 °F (37.1 °C)  Min: 98.1 °F (36.7 °C)  Max: 99 °F (37.2 °C)  Pulse  Av.5  Min: 101  Max: 245  Resp  Av.5  Min: 11  Max: 47  SpO2  Av.3 %  Min: 79 %  Max: 99 %  I/O last 3 completed shifts:  In: 0   Out: 425 [Urine:425]    Physical Examination:  Gen: Making groaning noises, appears comfortable on room air  HEENT: NCAT, PERRL, MMM, JVP unable to assess, no thyromegaly, lymphadenopathy appreciated, neck supple  CV: RRR, S1/S2 appreciated, no murmur gallop or rubs  Resp: CTA B/L, no increased WOB, no crackles, rhonchi appreciated  Abdomen: Soft, NT, ND, +BS  Ext: 2+ distal pulses, no edema appreciated  Skin: Warm, dry, no rashes appreciated  Neuro: follows simple commands: can cough on command, can move his distal toes/fingers on command, significantly weak    Laboratory:  Trended Lab Data:  Recent Labs   Lab 20  0459 20  0935 20  0734   WBC 17.28* 26.60* 28.36*  28.36*   HGB 12.4* 12.5* 14.4  14.4   HCT 38.7* 39.6* 44.0  44.0    199 294  294       Recent Labs   Lab 20  1525 20  0734 20  1025  20  1313 20  0359   * 141  144 140   < > 148* 147* 152*  152*   K 4.9 5.3*  4.6 4.5   < > 5.3*  5.3* 5.6*  5.6* 4.6  4.6  4.6  4.6    106  105 105   < > 109 109 112*  112*   CO2 28 24  28 27   < > 29 26 27  27   BUN 97* 56*  58* 47*   < > 124* 131* 127*  127*   CREATININE 2.4* 1.7*  1.7* 1.4   < > 2.4* 2.2* 2.0*  2.0*   * 187*  189* 210*   < > 237* 247* 245*  245*   CALCIUM 7.8* 8.4*  8.6* 8.3*   < > 7.7* 7.7*  7.9*  7.9*   MG 2.5 2.6 2.5  --   --   --   --    PHOS 4.1 3.4 3.3  --   --   --   --     < > = values in this interval not displayed.       Recent Labs   Lab 04/03/20  0935 04/03/20  1525 04/04/20  0734 04/06/20  1330   PROT 5.6*  --  6.5 5.4*   ALBUMIN 1.5* 1.4* 1.6*  1.6* 1.5*   BILITOT 1.3*  --  0.9 0.6   AST 71*  --  52* 41*   ALT 27  --  27 21   ALKPHOS 105  --  115 61       No results for input(s): PROTIME, PTT, INR in the last 168 hours.    Cardiac:   Recent Labs   Lab 04/04/20  1025   TROPONINI 0.082*       FLP:   Lab Results   Component Value Date    TRIG 94 03/29/2020     DM:   Lab Results   Component Value Date    HGBA1C 7.4 (H) 01/28/2020    MICROALBUR 1.3 01/28/2020    CREATININE 2.0 (H) 04/08/2020    CREATININE 2.0 (H) 04/08/2020     Thyroid: No results found for: TSH, FREET4, J4VTQBY, Y4GGPSX, THYROIDAB  Anemia: No results found for: IRON, TIBC, FERRITIN, PECVUSVJ19, FOLATE  Urinalysis:   Lab Results   Component Value Date    COLORU Yellow 03/21/2020    SPECGRAV 1.010 03/21/2020    NITRITE Negative 03/21/2020    KETONESU Negative 03/21/2020    UROBILINOGEN Negative 03/21/2020       Microbiology:  Microbiology Results (last 7 days)     Procedure Component Value Units Date/Time    Blood culture [[id number]] Collected:  04/03/20 1208    Order Status:  Completed Specimen:  Blood from Antecubital, Left Updated:  04/07/20 1812     Blood Culture, Routine No Growth after 4 days.     Blood culture [[id number]] Collected:  04/03/20 1208    Order Status:  Completed Specimen:  Blood Updated:  04/07/20 1812     Blood Culture, Routine No Growth after 4 days.     Culture, Respiratory with Gram Stain [[id number]]  (Abnormal) Collected:  04/03/20 1116    Order Status:  Completed Specimen:  Respiratory from Tracheal Aspirate Updated:  04/06/20 1967     Respiratory Culture No S aureus or Pseudomonas isolated.      CANDIDA ALBICANS  Few        GHANSHYAM TROPICALIS  Few       Gram Stain (Respiratory) <10 epithelial cells  per low power field.     Gram Stain (Respiratory) Few WBC's     Gram Stain (Respiratory) No organisms seen        Current Medications:     Infusions:       Scheduled:   heparin (porcine)  5,000 Units Subcutaneous Q12H    insulin detemir U-100  10 Units Subcutaneous Daily        PRN:  acetaminophen, aluminum-magnesium hydroxide-simethicone, morphine, ondansetron, sodium chloride 0.9%    Assessment:     Zhao Fischer is a 74 y.o.male with COPD, CKD, T2DM, HTN, HLD, PAD who was found to be COVID19+, and intubated for a prolonged period of time, who is now extubated. Stable for stepdown    Plan:     Neuro:  #Acute Metabolic Encephalopathy vs Delirium  #Deconditioning  - remains off sedation  extubated 4/6  - following simple commands since 4/7  - discussed with Ms. Gordon (Wife) regarding these findings, will transition from comfort measures  - Needs PT/OT/SLP evaluation    Cardiovascular/Hemodynamics:  #Sepsis 2/2 COVID19  - Resolved, off pressors and antibiotics    #PAD  - Plavix, Statin restarted    #HTN  - Normotensive, holding home amlodipine and metoprolol  - if needed, can restart    Respiratory:   #Acute Hypoxic Respiratory Failure  #COVID19  #Concerns for VAP  - Intubated 3/23/20  Extubated 4/6/20  - CPT q6  - Continue humidified oxygen via NC    GI/FEN:  Fluid: -1.75L since admission  Nutrition: tube feeds; placed NG tube and confirmed position     #Hypernatremia, Hyperchloremia  - Na 152  - roughly 3L free water deficit, will correct slowly  - Give 1L D5 @75cc/hr today  - Will continue free water flushes with tube feeds     ID:   #COVID19  - completed 3.5 days of HCQ     #Concerns for VAP  - Resp Culture with candida albicans, tropicalis  - stopped antibiotics, monitor     Renal:   #KEYUR on CKD  - given lasix x1 yesterday  UOP 1000cc  - hold diuretics as patient is volume down  - Nephrology following    #Hyperkalemia  - K 4.6, resolved     #Hypernatremia, Hyperchloremia  - Na 152  - roughly 3L free water  deficit, will correct slowly  - Give 1L D5 @75cc/hr today  - Will continue free water flushes with tube feeds     Heme/Onc:   Hgb stable      Endocrine:  #DM2  - Start Levemir 10U daily  - SSI + Accucheck     Code:  DNR/DNI     Dispo: Remains stable overnight, can continue to follow simple commands (cough, wiggle toes), started levemir for glycemic control, tolerated tube feeds, needs to have PT/OT/SLP assess him. Can be stepped down to the floor.    Real Felix MD  [facility] -II  [facility] Pulmonary/Critical Care Service    Pt seen and examined with Pulmonary/Critical Care team. Critical Care time was spent validating the history and physical exam, reviewing the lab and imaging results, and discussing the care of the patient with the bedside nurse. The following additional comments are made:    Pt appears volume deplete and hemoconcentrated but is still making lots of urine.  He may be in the polyuric phase of his ATN and may have an osmotic diuresis. We need to do a better job with glycemic control. We will also increase his IVFs.  OK to step down but I will keep an eye on him on the floor.    Critical Care time 30 minutes    Brigido Lewis MD  Phone [phone]

## 2020-04-09 PROBLEM — Z51.5 COMFORT MEASURES ONLY STATUS: Status: RESOLVED | Noted: 2020-01-01 | Resolved: 2020-01-01

## 2020-04-09 PROBLEM — Z71.89 GOALS OF CARE, COUNSELING/DISCUSSION: Status: RESOLVED | Noted: 2020-01-01 | Resolved: 2020-01-01

## 2020-04-09 PROBLEM — I42.9 CARDIOMYOPATHY: Status: RESOLVED | Noted: 2020-01-01 | Resolved: 2020-01-01

## 2020-04-09 PROBLEM — J96.01 ACUTE RESPIRATORY FAILURE WITH HYPOXIA: Status: RESOLVED | Noted: 2020-01-01 | Resolved: 2020-01-01

## 2020-04-09 PROBLEM — I10 ESSENTIAL HYPERTENSION: Chronic | Status: RESOLVED | Noted: 2020-01-01 | Resolved: 2020-01-01

## 2020-04-09 PROBLEM — N17.9 AKI (ACUTE KIDNEY INJURY): Status: RESOLVED | Noted: 2020-01-01 | Resolved: 2020-01-01

## 2020-04-09 PROBLEM — J12.82 PNEUMONIA DUE TO SEVERE ACUTE RESPIRATORY SYNDROME CORONAVIRUS 2 (SARS-COV-2): Status: RESOLVED | Noted: 2020-01-01 | Resolved: 2020-01-01

## 2020-04-09 PROBLEM — I73.9 PAD (PERIPHERAL ARTERY DISEASE): Chronic | Status: RESOLVED | Noted: 2020-01-01 | Resolved: 2020-01-01

## 2020-04-09 PROBLEM — I25.10 ASCVD (ARTERIOSCLEROTIC CARDIOVASCULAR DISEASE): Chronic | Status: RESOLVED | Noted: 2020-01-01 | Resolved: 2020-01-01

## 2020-04-09 PROBLEM — U07.1 ACUTE RESPIRATORY DISTRESS SYNDROME (ARDS) DUE TO COVID-19 VIRUS: Status: RESOLVED | Noted: 2020-01-01 | Resolved: 2020-01-01

## 2020-04-09 PROBLEM — R05.9 COUGH: Status: RESOLVED | Noted: 2020-01-01 | Resolved: 2020-01-01

## 2020-04-09 PROBLEM — U07.1 ACUTE RESPIRATORY DISEASE DUE TO COVID-19 VIRUS: Status: RESOLVED | Noted: 2020-01-01 | Resolved: 2020-01-01

## 2020-04-09 PROBLEM — J80 ACUTE RESPIRATORY DISTRESS SYNDROME (ARDS) DUE TO COVID-19 VIRUS: Status: RESOLVED | Noted: 2020-01-01 | Resolved: 2020-01-01

## 2020-04-09 PROBLEM — Z71.89 COUNSELING REGARDING ADVANCE CARE PLANNING AND GOALS OF CARE: Status: RESOLVED | Noted: 2020-01-01 | Resolved: 2020-01-01

## 2020-04-09 PROBLEM — E11.51 TYPE 2 DIABETES MELLITUS WITH DIABETIC PERIPHERAL ANGIOPATHY WITHOUT GANGRENE, WITHOUT LONG-TERM CURRENT USE OF INSULIN: Chronic | Status: RESOLVED | Noted: 2020-01-01 | Resolved: 2020-01-01

## 2020-04-09 PROBLEM — Z51.5 PALLIATIVE CARE ENCOUNTER: Status: RESOLVED | Noted: 2020-01-01 | Resolved: 2020-01-01

## 2020-04-09 PROBLEM — U07.1 PNEUMONIA DUE TO SEVERE ACUTE RESPIRATORY SYNDROME CORONAVIRUS 2 (SARS-COV-2): Status: RESOLVED | Noted: 2020-01-01 | Resolved: 2020-01-01

## 2020-04-09 PROBLEM — J06.9 ACUTE RESPIRATORY DISEASE DUE TO COVID-19 VIRUS: Status: RESOLVED | Noted: 2020-01-01 | Resolved: 2020-01-01

## 2020-04-09 PROBLEM — E78.2 HYPERLIPIDEMIA, MIXED: Chronic | Status: RESOLVED | Noted: 2020-01-01 | Resolved: 2020-01-01

## 2020-04-09 NOTE — PROGRESS NOTES
Pt seen and examined with Pulmonary/Critical Care team. Critical Care time was spent validating the history and physical exam, reviewing the lab and imaging results, and discussing the care of the patient with the bedside nurse. The following additional comments are made:    Mr Fischer  peacefully at 8:10PM. I called his wife and offered condolences and bereavement support. [facility] 's Office notified.    Brigido Lewis MD  Phone [phone]

## 2020-04-10 NOTE — DISCHARGE SUMMARY
[facility] Hospital Medicine  Discharge Summary      Patient Name: Zhao Fischer  MRN: [id number]  Admission Date: 3/21/2020  Hospital Length of Stay: 17 days  Discharge Date and Time:   04/08/2020 at 8:10PM.      Attending Physician: No att. providers found   Discharging Provider: Rafaela Romero MD  Primary Care Provider: Mannie Russell MD      HPI:   Zhao Fischer is a 74 year old white man with hypertension, hyperlipidemia, diabetes mellitus type 2, peripheral artery disease, arteriosclerotic cardiovascular disease. He lives in [place], [place]. He has been  since he was 16 years old. He enjoys tending his [employer]. His primary care physician is Dr. Mannie Russell.    He contacted Dr. Russell on 3/16/2020 to request testing for COVID-19 due to nonproductive cough, shortness of breath, and fever for the past day, but he could not get tested due to limited outpatient testing. He was prescribed levofloxacin 500 mg daily.    He presented to [facility] Emergency Department on 3/21/2020 with persistent symptoms, including diarrhea, which is common with COVID-19 infection, and poor appetite. He reported that his wife had similar symptoms. He was hypoxic, with oxygen saturation of 88%. Chest X-ray showed interstitial lung opacities. WBC count was low at 2780. BNP, lactate, and procalcitonin were low. CRP was elevated at 154 mg/L. COVID-19 testing has been liberal in the emergency department (criteria of cough alone is enough) so he was tested. He was not given anything other than supplemental oxygen in the emergency department. He was admitted to [facility].     * No surgery found *      Hospital Course:   He was put on hydroxychloroquine and azithromycin. His hypoxia worsened overnight between 3/22/2020 and 3/23/2020. Pulmonology was consulted and he was intubated. COVID-19 result was reported positive on 3/24/2020. He developed acute kidney  injury on 3/24/2020. Palliative Care was consulted. Hydroxychloroquine was stopped early due to hypoglycemia. Nephrology was consulted on 3/26/2020. A left internal jugular trialysis catheter was placed on 3/27/2020. He finished a course of azithromycin on 3/27/2020. He started continuous renal replacement therapy and was given furosemide infusion to augment urine output. Pulmonology diagnosed him with chronic obstructive pulmonary disease and started albuterol-ipratropium nebulizer treatments. Repeat chest X-ray on 4/2/2020 showed similar lung opacities as recent X-rays. He developed new fever on 4/3/2020, so cefepime and vancomycin were started. He developed atrial fibrillation and ventricular tachycardia on 4/4/2020. Pulmonology discussed his lack of improvement and poor prognosis with his wife. He was made DNR and transitioned to comfort measures only. He was extubated in the afternoon of 4/6/2020.     Patient was pronounced on 04/08/2020 at 8:10PM.  Wife was called condolences and bereavement support was offered by the pulmonary team.       Consults:   Consults (From admission, onward)        Status Ordering Provider     Inpatient consult to Infectious Diseases  Once     Provider:  July Pickard MD    Completed MAGDALENA GUTIÉRREZ     [facility]patient [facility] to [facility]  Once     Provider:  (Not yet assigned)    Completed TEO COUGHLIN     Inpatient consult to Palliative Care  Once     Provider:  (Not yet assigned)    Completed TEO COUGHLIN     Inpatient consult to Pulmonology  Once     Provider:  (Not yet assigned)    Completed MAGDALENA GUTIÉRREZ     Inpatient consult to Registered Dietitian/Nutritionist  Once     Provider:  (Not yet assigned)    Completed JOSE DUFF          No new Assessment & Plan notes have been filed under this hospital service since the last note was generated.  Service: Hospital Medicine    Final Active Diagnoses:      Problems  Resolved During this Admission:    Diagnosis Date Noted Date Resolved POA    PRINCIPAL PROBLEM:  Pneumonia due to severe acute respiratory syndrome coronavirus 2 (SARS-CoV-2) [U07.1, J12.89] 2020 Yes    Comfort measures only status [Z51.5] 2020 Not Applicable    Cardiomyopathy [I42.9] 2020 No    Viral pneumonia [J12.9]  2020 Yes    Hyperglycemia [R73.9]  2020 No    Chronic obstructive pulmonary disease [J44.9]  2020 Yes    Hypoglycemia [E16.2] 2020 Yes    Goals of care, counseling/discussion [Z71.89] 2020 Not Applicable    Counseling regarding advance care planning and goals of care [Z71.89] 2020 Not Applicable    Palliative care encounter [Z51.5] 2020 Not Applicable    Acute respiratory disease due to COVID-19 virus [U07.1, J06.9] 2020 Yes    KEYUR (acute kidney injury) [N17.9] 2020 No    Elevated LFTs [R94.5] 2020 Yes    Cough [R05] 2020 Yes    Acute respiratory distress syndrome (ARDS) due to COVID-19 virus [U07.1, J80] 2020 Yes    Acute respiratory failure with hypoxia [J96.01] 2020 Yes    Essential hypertension [I10] 2020 Yes     Chronic    Hyperlipidemia, mixed [E78.2] 2020 Yes     Chronic    PAD (peripheral artery disease) [I73.9] 2020 Yes     Chronic    Type 2 diabetes mellitus with diabetic peripheral angiopathy without gangrene, without long-term current use of insulin [E11.51] 2020 Yes     Chronic    ASCVD (arteriosclerotic cardiovascular disease) [I25.10] 2020 Yes     Chronic       Discharged Condition:     Disposition:     Follow Up:    Patient Instructions:   No discharge procedures on file.    Significant Diagnostic Studies:     Pending Diagnostic  Studies:     None         Medications:  None (patient  at medical facility)    Indwelling Lines/Drains at time of discharge:   Lines/Drains/Airways     Central Venous Catheter Line            Trialysis (Dialysis) Catheter 20 1123 left internal jugular 13 days          Drain                 Urethral Catheter 20 1206 17 days         NG/OG Tube 20 1330 nasogastric Right nostril 3 days                Time spent on the discharge of patient: 20 minutes  Patient was seen and examined on the date of discharge and determined to be suitable for discharge.    Critical care time spent on the evaluation and treatment of severe organ dysfunction, review of pertinent labs and imaging studies, discussions with consulting providers and discussions with patient/family: 20 minutes.     Rafaela Romero MD  Department of Hospital Medicine  [facility]

## 2020-04-14 NOTE — PHYSICIAN QUERY
PT Name: Zhao Fischer  MR #: [id number]    Physician Query Form - Perfusion Diagnosis Clarification     CDS/: Jodee Rodriguez RN              Contact information:[email or web address]  This form is a permanent document in the medical record.     Query Date: April 14, 2020    By submitting this query, we are merely seeking further clarification of documentation. Please utilize your independent clinical judgment when addressing the question(s) below.    The medical record contains the following:    Indicators   Supporting Clinical Findings   Location in Medical Record   X Acute Illness (e.g. AMI, Sepsis, etc.) Pneumonia due to severe acute respiratory syndrome coronavirus 2 (SARS-CoV-2)  Acute respiratory distress syndrome (ARDS) due to COVID-19 virus  Acute respiratory failure with hypoxia, COPD, KEYUR, ASCVD, HLD, PAD, DM2    Sepsis due to Covid-19 Hospital medicine 4/3        Physician query response 4/1    Acidosis documented     X ABGs / Labs  Labs   X Vital Signs BP 88/59, HR 93, RR 21 Temp 99.9  BP 79/55, HR 83, RR 25 Temp 98.1  BP 66/49, , RR 20, Temp 100 Vitals 4/2 2301  Vitals 4/3 1501  VItals 4/3 2201   X Hypotension or Low Blood Pressure documented Hypotensive this morning, starting levophed Pulmonology 4/3    Altered Mental Status or Confusion      Diaphoresis, Cold Extremities or Cyanosis     X Oliguria for KEYUR in setting of COVID positive.   Intubated, Sedated and COVID positive  Will Continue daily CRRT.  Nephrology 4/3   X Medication/Treatment:  -Vasopressors  -Inotropic Drugs  -IV Fluids  -Cardiac Assist Devices  -Hemodynamic Monitoring  -Blood/Blood Products Amiodarone 33.3 ml/hr IV continuous 4/4  Midodrine 10mg per OGT TID     On assist control, rate 24, tidal volume 420, FiO2 70%, PEEP 12. SpO2 97%. On dexmedetomidine, ketamine, furosemide drips.      [facility] medicine 4/3    Other:       Provider, please specify diagnosis or diagnoses associated with above clinical  findings.    [  x ] Septic Shock   [   ] Other Shock (please specify):   [   ] Shock Unspecified   [   ] Other Condition (please specify):   [  ] Clinically Undetermined         Please document in your progress notes daily for the duration of treatment until resolved and include in your discharge summary.

## 2020-04-29 NOTE — PHYSICIAN QUERY
PT Name: Zhao Fischer  MR #: [id number]     Physician Query Form - Documentation Clarification      CDS: Farida Crenshaw RN     Contact information:[email or web address] or (cell) [phone]    This form is a permanent document in the medical record.     Query Date: April 29, 2020    By submitting this query, we are merely seeking further clarification of documentation. Please utilize your independent clinical judgment when addressing the question(s) below.    The Medical record reflects the following:    Supporting Clinical Findings Location in Medical Record   encephalopathy persists and worsening hyperkalemia despite lasix.   Pulm PN 3/29   Pneumonia due to severe acute respiratory syndrome coronavirus 2 (SARS-CoV-2)  Acute Respiratory Disease due to Covid-19 Virus  Acute respiratory failure with hypoxia     Appreciate Infectious Disease and Pulmonology.   COVID 19 positive- per ID - ydroxychloroquine, azithromycin & ceftriaxone and monitor QTc  Consult palliative care for support    PN 3/29   FEN/METABOLIC  Hyperkalemia 2/2 KEYUR - shifting today, see KEYUR  Hyponatremia 2/2 KEYUR - stable  AGMA 2/2 KEYUR - stable  Hypertriglyceridemia, mild  Hyperphos 2/2 KEYUR  Hyperglycemia - added detemir, q6hour glucose checks, SSI   Pulm PN 3/29   Cardiovascular/Hemodynamics:  #Sepsis  #COVID19  #PAD  #HTN  - not on pressors  - Holding amlodipine and lisinopril  - continue atorvastatin, plavix, fenofibrate  - starting coreg 6.25mg BID, if needing more BP control start hydralazine   Pul PN 3/30   [x   ] Anoxic/Hypoxic Encephalopathy- Brain damage due to anoxia/hypoxia   Query Answer- 3/31   Acute Metabolic Encephalopathy  - remains off sedation, extubated on 4/6  - this morning, notified by nursing staff that patient mentation beginning to improve. Following simple commands, able to cough and adjust himself in bed  - Will update his wife, stop glyco, start CPT and humidified O2 on NC  - Discuss with Nephrology regarding CRRT vs  PD   Pulm PN 4/7                                                                            Doctor, due to conflicting documentation please clarify the Encephalopathy diagnosis.     Provider Use Only      [x  ] Anoxic/Hypoxic Encephalopathy (Anoxic Brain Injury)      [  ] Metabolic Encephalopathy      [  ] Anoxic/Hypoxic Encephalopathy (Anoxic Brain Injury) and Metabolic Encephalopathy      [  ] Other:______________________                                                                                                               [  ] Clinically Undetermined

## 2023-03-28 NOTE — PLAN OF CARE
Patient on oxygen with documented flow. Will attempt to wean per protocol.     4 = No assist / stand by assistance

## 2023-10-10 NOTE — TELEPHONE ENCOUNTER
----- Message from Anaid Anthony sent at 3/23/2020 11:07 AM CDT -----  Contact: Pt spouse Heidi    Please call pt spouse Heidi [phone], thanks     
Called and discussed pt's condition with wife.  
Pt is being moved to ICU. Mrs Gordon ask that you call her  
Admission

## 2024-01-16 NOTE — PROGRESS NOTES
B12 level 535 (12/2022).   - Continue monthly IM 1000 mcg B12 injections  - Check B12 level today   [facility]  Progress Note    Patient Name: Zhao Fischer  MRN: [id number]  Patient Class: IP- Inpatient   Admission Date: 3/21/2020  Length of Stay: 3 days  Attending Physician: Rafaela Romero*  Primary Care Provider: Mannie Russell MD        Subjective:     Principal Problem:Pneumonia due to Severe Acute Respiratory Syndrome Coronavirus 2 (SARS-Cov-2)        HPI:  Zhao Fischer is a 74 year old white man with hypertension, hyperlipidemia, diabetes mellitus type 2, peripheral artery disease, arteriosclerotic cardiovascular disease. He lives in [place], [place]. He has been  since he was 16 years old. His primary care physician is Dr. Mannie Russell.    He contacted Dr. Russell on 3/16/2020 to request testing for COVID-19 due to nonproductive cough, shortness of breath, and fever for the past day, but he could not get tested due to limited outpatient testing. He was prescribed levofloxacin 500 mg daily.    He presented to [facility] on 3/21/2020 with persistent symptoms, including diarrhea, which is common with COVID-19 infection, and poor appetite. He reported that his wife had similar symptoms. He was hypoxic, with oxygen saturation of 88%. Chest X-ray showed interstitial lung opacities. WBC count was low at 2780. BNP, lactate, and procalcitonin were low. CRP was elevated at 154 mg/L. COVID-19 testing has been liberal in the emergency department (criteria of cough alone is enough) so he was tested. He was not given anything other than supplemental oxygen in the emergency department. He was admitted to [facility].     Overview/Hospital Course:  He was put on hydroxychloroquine and azithromycin. His hypoxia worsened overnight between 3/22/2020 and 3/23/2020. Pulmonology was consulted.   3/24 COVID 19 positive and family updated, questions and concerns addressed.consult palliative care    Interval  History: intubated,   Clinically better, but reported hypoglycemic episodes likely due to HC. Now on D10 and accuchecks. Continue to titrate tube feed to goal     worsening renal function. Awaits nephrology rec's  Continue  Hydroxychloroquine, azithromycin, & ceftriaxone and monitor QTc  Patient is COVID positive    Review of Systems   Unable to perform ROS: Intubated     Objective:     Vital Signs (Most Recent):  Temp: 97.6 °F (36.4 °C) (03/26/20 0805)  Pulse: 109 (03/26/20 0915)  Resp: 20 (03/26/20 0915)  BP: 115/66 (03/26/20 0915)  SpO2: (!) 88 % (03/26/20 0915) Vital Signs (24h Range):  Temp:  [97.5 °F (36.4 °C)-101.1 °F (38.4 °C)] 97.6 °F (36.4 °C)  Pulse:  [] 109  Resp:  [17-42] 20  SpO2:  [88 %-98 %] 88 %  BP: ()/(50-70) 115/66     Weight: 73.9 kg (162 lb 14.7 oz)  Body mass index is 24.77 kg/m².    Intake/Output Summary (Last 24 hours) at 3/26/2020 1134  Last data filed at 3/26/2020 0900  Gross per 24 hour   Intake 3070.4 ml   Output 320 ml   Net 2750.4 ml      Physical Exam   Constitutional: He appears well-developed. No distress.   Pulmonary/Chest: Tachypnea noted. No respiratory distress.   intubated   Nursing note and vitals reviewed.      Significant Labs:   ABGs:   No results for input(s): PH, PCO2, HCO3, POCSATURATED, BE, TOTALHB, COHB, METHB, O2HB, POCFIO2 in the last 48 hours.  Blood Culture: No results for input(s): LABBLOO in the last 48 hours.  BMP:   Recent Labs   Lab 03/26/20  0541   *   *   K 4.3   CL 97   CO2 18*   BUN 78*   CREATININE 3.2*   CALCIUM 7.6*   MG 2.5     CBC:   Recent Labs   Lab 03/25/20  0510 03/26/20  0542   WBC 6.20 13.34*   HGB 15.2 14.4   HCT 45.7 44.0    188     CMP:   Recent Labs   Lab 03/25/20  0510 03/26/20  0541   * 127*   K 4.1 4.3    97   CO2 19* 18*   GLU 45* 210*   BUN 72* 78*   CREATININE 2.7* 3.2*   CALCIUM 7.9* 7.6*   PROT 5.3* 5.1*   ALBUMIN 2.2* 1.9*   BILITOT 0.3 0.9   ALKPHOS 60 109   AST 67* 57*   ALT 45* 50*    ANIONGAP 10 12   EGFRNONAA 22* 18*     Lactic Acid: No results for input(s): LACTATE in the last 48 hours.  TSH: No results for input(s): TSH in the last 4320 hours.  Urine Culture: No results for input(s): LABURIN in the last 48 hours.  Urine Studies: No results for input(s): COLORU, APPEARANCEUA, PHUR, SPECGRAV, PROTEINUA, GLUCUA, KETONESU, BILIRUBINUA, OCCULTUA, NITRITE, UROBILINOGEN, LEUKOCYTESUR, RBCUA, WBCUA, BACTERIA, SQUAMEPITHEL, HYALINECASTS in the last 48 hours.    Invalid input(s): WRIGHTSUR    Significant Imaging: I have reviewed all pertinent imaging results/findings within the past 24 hours.      Assessment/Plan:      * Pneumonia due to Severe Acute Respiratory Syndrome Coronavirus 2 (SARS-Cov-2)  Acute Respiratory Disease due to Covid-19 Virus  Acute respiratory failure with hypoxia    Appreciate Infectious Disease and Pulmonology.   COVID 19 positive  Continue hydroxychloroquine, azithromycin & ceftriaxone and monitor QTc  Consult palliative care for support    Hypoglycemia  Likely due to hydroxychloroquine  continue D10 and titrate TF to goal      Elevated LFTs  monitor      KEYUR (acute kidney injury)  Avoid nephrotoxic agens  Renally dose all meds  Monitor  Consult nephrology  Strict input/output      Acute respiratory failure with hypoxia  See primary problem.    Essential hypertension  ASCVD (arteriosclerotic cardiovascular disease)  Hyperlipidemia   PAD (peripheral artery disease)  Continue home amlodipine 2.5 mg daily, atorvastatin 40 mg daily, clopidrogel 75 mg daily.  Hold Metoprolol succinate   Hold lisinopril due to worsening renal function    Type 2 diabetes mellitus with diabetic peripheral angiopathy without gangrene, without long-term current use of insulin  Hold home glimepiride, empagliflozin. I  nsulin aspart sliding scale.      VTE Risk Mitigation (From admission, onward)         Ordered     heparin (porcine) injection 5,000 Units  Every 12 hours      03/23/20 1357     IP VTE HIGH  RISK PATIENT  Once      03/21/20 2227     Place sequential compression device  Until discontinued      03/21/20 2227                Critical care time spent on the evaluation and treatment of severe organ dysfunction, review of pertinent labs and imaging studies, discussions with consulting providers and discussions with patient/family: 35 minutes.      Rafaela Romero MD  Department of Hospital Medicine   [facility]